# Patient Record
Sex: FEMALE | Race: WHITE | NOT HISPANIC OR LATINO | Employment: OTHER | URBAN - METROPOLITAN AREA
[De-identification: names, ages, dates, MRNs, and addresses within clinical notes are randomized per-mention and may not be internally consistent; named-entity substitution may affect disease eponyms.]

---

## 2017-01-12 ENCOUNTER — GENERIC CONVERSION - ENCOUNTER (OUTPATIENT)
Dept: OTHER | Facility: OTHER | Age: 82
End: 2017-01-12

## 2017-01-30 ENCOUNTER — TRANSCRIBE ORDERS (OUTPATIENT)
Dept: LAB | Facility: CLINIC | Age: 82
End: 2017-01-30

## 2017-01-30 ENCOUNTER — APPOINTMENT (OUTPATIENT)
Dept: LAB | Facility: CLINIC | Age: 82
End: 2017-01-30
Payer: MEDICARE

## 2017-01-30 DIAGNOSIS — I10 ESSENTIAL (PRIMARY) HYPERTENSION: ICD-10-CM

## 2017-01-30 DIAGNOSIS — I10 ESSENTIAL HYPERTENSION, MALIGNANT: Primary | ICD-10-CM

## 2017-01-30 LAB
ALBUMIN SERPL BCP-MCNC: 3.7 G/DL (ref 3.5–5)
ALP SERPL-CCNC: 95 U/L (ref 46–116)
ALT SERPL W P-5'-P-CCNC: 26 U/L (ref 12–78)
ANION GAP SERPL CALCULATED.3IONS-SCNC: 6 MMOL/L (ref 4–13)
AST SERPL W P-5'-P-CCNC: 18 U/L (ref 5–45)
BASOPHILS # BLD AUTO: 0.02 THOUSANDS/ΜL (ref 0–0.1)
BASOPHILS NFR BLD AUTO: 0 % (ref 0–1)
BILIRUB SERPL-MCNC: 0.42 MG/DL (ref 0.2–1)
BUN SERPL-MCNC: 25 MG/DL (ref 5–25)
CALCIUM SERPL-MCNC: 9.7 MG/DL (ref 8.3–10.1)
CHLORIDE SERPL-SCNC: 104 MMOL/L (ref 100–108)
CHOLEST SERPL-MCNC: 159 MG/DL (ref 50–200)
CO2 SERPL-SCNC: 28 MMOL/L (ref 21–32)
CREAT SERPL-MCNC: 1.07 MG/DL (ref 0.6–1.3)
EOSINOPHIL # BLD AUTO: 0.08 THOUSAND/ΜL (ref 0–0.61)
EOSINOPHIL NFR BLD AUTO: 2 % (ref 0–6)
ERYTHROCYTE [DISTWIDTH] IN BLOOD BY AUTOMATED COUNT: 13.2 % (ref 11.6–15.1)
GFR SERPL CREATININE-BSD FRML MDRD: 49.3 ML/MIN/1.73SQ M
GLUCOSE SERPL-MCNC: 91 MG/DL (ref 65–140)
HCT VFR BLD AUTO: 36.2 % (ref 34.8–46.1)
HDLC SERPL-MCNC: 77 MG/DL (ref 40–60)
HGB BLD-MCNC: 12 G/DL (ref 11.5–15.4)
LDLC SERPL DIRECT ASSAY-MCNC: 74 MG/DL (ref 0–100)
LYMPHOCYTES # BLD AUTO: 1.34 THOUSANDS/ΜL (ref 0.6–4.47)
LYMPHOCYTES NFR BLD AUTO: 25 % (ref 14–44)
MCH RBC QN AUTO: 30.7 PG (ref 26.8–34.3)
MCHC RBC AUTO-ENTMCNC: 33.1 G/DL (ref 31.4–37.4)
MCV RBC AUTO: 93 FL (ref 82–98)
MONOCYTES # BLD AUTO: 0.29 THOUSAND/ΜL (ref 0.17–1.22)
MONOCYTES NFR BLD AUTO: 6 % (ref 4–12)
NEUTROPHILS # BLD AUTO: 3.55 THOUSANDS/ΜL (ref 1.85–7.62)
NEUTS SEG NFR BLD AUTO: 67 % (ref 43–75)
NONHDLC SERPL-MCNC: 82 MG/DL
NRBC BLD AUTO-RTO: 0 /100 WBCS
PLATELET # BLD AUTO: 203 THOUSANDS/UL (ref 149–390)
PMV BLD AUTO: 10.5 FL (ref 8.9–12.7)
POTASSIUM SERPL-SCNC: 4.8 MMOL/L (ref 3.5–5.3)
PROT SERPL-MCNC: 6.9 G/DL (ref 6.4–8.2)
RBC # BLD AUTO: 3.91 MILLION/UL (ref 3.81–5.12)
SODIUM SERPL-SCNC: 138 MMOL/L (ref 136–145)
TRIGL SERPL-MCNC: 61 MG/DL
TSH SERPL DL<=0.05 MIU/L-ACNC: 0.91 UIU/ML (ref 0.36–3.74)
WBC # BLD AUTO: 5.28 THOUSAND/UL (ref 4.31–10.16)

## 2017-01-30 PROCEDURE — 36415 COLL VENOUS BLD VENIPUNCTURE: CPT

## 2017-01-30 PROCEDURE — 83721 ASSAY OF BLOOD LIPOPROTEIN: CPT

## 2017-01-30 PROCEDURE — 80053 COMPREHEN METABOLIC PANEL: CPT

## 2017-01-30 PROCEDURE — 85025 COMPLETE CBC W/AUTO DIFF WBC: CPT

## 2017-01-30 PROCEDURE — 80061 LIPID PANEL: CPT

## 2017-01-30 PROCEDURE — 84443 ASSAY THYROID STIM HORMONE: CPT

## 2017-01-31 ENCOUNTER — APPOINTMENT (OUTPATIENT)
Dept: LAB | Facility: CLINIC | Age: 82
End: 2017-01-31
Payer: MEDICARE

## 2017-01-31 LAB
BACTERIA UR QL AUTO: ABNORMAL /HPF
BILIRUB UR QL STRIP: NEGATIVE
CLARITY UR: CLEAR
COLOR UR: YELLOW
GLUCOSE UR STRIP-MCNC: NEGATIVE MG/DL
HGB UR QL STRIP.AUTO: NEGATIVE
HYALINE CASTS #/AREA URNS LPF: ABNORMAL /LPF
KETONES UR STRIP-MCNC: NEGATIVE MG/DL
LEUKOCYTE ESTERASE UR QL STRIP: ABNORMAL
NITRITE UR QL STRIP: NEGATIVE
NON-SQ EPI CELLS URNS QL MICRO: ABNORMAL /HPF
PH UR STRIP.AUTO: 6.5 [PH] (ref 4.5–8)
PROT UR STRIP-MCNC: NEGATIVE MG/DL
RBC #/AREA URNS AUTO: ABNORMAL /HPF
SP GR UR STRIP.AUTO: 1.02 (ref 1–1.03)
UROBILINOGEN UR QL STRIP.AUTO: 0.2 E.U./DL
WBC #/AREA URNS AUTO: ABNORMAL /HPF

## 2017-01-31 PROCEDURE — 81001 URINALYSIS AUTO W/SCOPE: CPT | Performed by: INTERNAL MEDICINE

## 2017-02-14 ENCOUNTER — ALLSCRIPTS OFFICE VISIT (OUTPATIENT)
Dept: OTHER | Facility: OTHER | Age: 82
End: 2017-02-14

## 2017-02-14 DIAGNOSIS — M81.0 AGE-RELATED OSTEOPOROSIS WITHOUT CURRENT PATHOLOGICAL FRACTURE: ICD-10-CM

## 2017-02-23 ENCOUNTER — GENERIC CONVERSION - ENCOUNTER (OUTPATIENT)
Dept: OTHER | Facility: OTHER | Age: 82
End: 2017-02-23

## 2017-04-06 ENCOUNTER — GENERIC CONVERSION - ENCOUNTER (OUTPATIENT)
Dept: OTHER | Facility: OTHER | Age: 82
End: 2017-04-06

## 2017-04-07 ENCOUNTER — ALLSCRIPTS OFFICE VISIT (OUTPATIENT)
Dept: OTHER | Facility: OTHER | Age: 82
End: 2017-04-07

## 2017-04-07 LAB — S PYO AG THROAT QL: NEGATIVE

## 2017-05-03 ENCOUNTER — HOSPITAL ENCOUNTER (OUTPATIENT)
Dept: MAMMOGRAPHY | Facility: CLINIC | Age: 82
Discharge: HOME | End: 2017-05-03
Payer: MEDICARE

## 2017-05-03 DIAGNOSIS — M81.0 AGE-RELATED OSTEOPOROSIS WITHOUT CURRENT PATHOLOGICAL FRACTURE: ICD-10-CM

## 2017-05-03 PROCEDURE — 77080 DXA BONE DENSITY AXIAL: CPT

## 2017-05-08 ENCOUNTER — ALLSCRIPTS OFFICE VISIT (OUTPATIENT)
Dept: OTHER | Facility: OTHER | Age: 82
End: 2017-05-08

## 2017-06-05 ENCOUNTER — GENERIC CONVERSION - ENCOUNTER (OUTPATIENT)
Dept: OTHER | Facility: OTHER | Age: 82
End: 2017-06-05

## 2017-06-06 ENCOUNTER — ALLSCRIPTS OFFICE VISIT (OUTPATIENT)
Dept: OTHER | Facility: OTHER | Age: 82
End: 2017-06-06

## 2017-06-19 ENCOUNTER — TRANSCRIBE ORDERS (OUTPATIENT)
Dept: LAB | Facility: CLINIC | Age: 82
End: 2017-06-19

## 2017-06-19 ENCOUNTER — APPOINTMENT (OUTPATIENT)
Dept: LAB | Facility: CLINIC | Age: 82
End: 2017-06-19
Payer: MEDICARE

## 2017-06-19 DIAGNOSIS — N30.90 CYSTITIS, UNSPECIFIED: ICD-10-CM

## 2017-06-19 DIAGNOSIS — N30.90 CYSTITIS, UNSPECIFIED: Primary | ICD-10-CM

## 2017-06-19 LAB
BACTERIA UR QL AUTO: ABNORMAL /HPF
BILIRUB UR QL STRIP: NEGATIVE
CLARITY UR: CLEAR
COLOR UR: YELLOW
GLUCOSE UR STRIP-MCNC: NEGATIVE MG/DL
HGB UR QL STRIP.AUTO: NEGATIVE
HYALINE CASTS #/AREA URNS LPF: ABNORMAL /LPF
KETONES UR STRIP-MCNC: NEGATIVE MG/DL
LEUKOCYTE ESTERASE UR QL STRIP: NEGATIVE
NITRITE UR QL STRIP: NEGATIVE
NON-SQ EPI CELLS URNS QL MICRO: ABNORMAL /HPF
PH UR STRIP.AUTO: 7 [PH] (ref 4.5–8)
PROT UR STRIP-MCNC: NEGATIVE MG/DL
RBC #/AREA URNS AUTO: ABNORMAL /HPF
SP GR UR STRIP.AUTO: 1.01 (ref 1–1.03)
UROBILINOGEN UR QL STRIP.AUTO: 0.2 E.U./DL
WBC #/AREA URNS AUTO: ABNORMAL /HPF

## 2017-06-19 PROCEDURE — 81001 URINALYSIS AUTO W/SCOPE: CPT | Performed by: OBSTETRICS & GYNECOLOGY

## 2017-06-19 PROCEDURE — 87086 URINE CULTURE/COLONY COUNT: CPT

## 2017-06-20 LAB — BACTERIA UR CULT: NORMAL

## 2017-07-21 ENCOUNTER — TRANSCRIBE ORDERS (OUTPATIENT)
Dept: LAB | Facility: CLINIC | Age: 82
End: 2017-07-21

## 2017-07-21 ENCOUNTER — APPOINTMENT (OUTPATIENT)
Dept: LAB | Facility: CLINIC | Age: 82
End: 2017-07-21
Payer: MEDICARE

## 2017-07-21 DIAGNOSIS — N30.90 CYSTITIS, UNSPECIFIED: Primary | ICD-10-CM

## 2017-07-21 DIAGNOSIS — N30.90 CYSTITIS, UNSPECIFIED: ICD-10-CM

## 2017-07-21 LAB

## 2017-07-21 PROCEDURE — 81001 URINALYSIS AUTO W/SCOPE: CPT | Performed by: OBSTETRICS & GYNECOLOGY

## 2017-07-21 PROCEDURE — 87086 URINE CULTURE/COLONY COUNT: CPT

## 2017-07-22 LAB — BACTERIA UR CULT: NORMAL

## 2017-08-14 ENCOUNTER — GENERIC CONVERSION - ENCOUNTER (OUTPATIENT)
Dept: OTHER | Facility: OTHER | Age: 82
End: 2017-08-14

## 2017-10-02 ENCOUNTER — GENERIC CONVERSION - ENCOUNTER (OUTPATIENT)
Dept: OTHER | Facility: OTHER | Age: 82
End: 2017-10-02

## 2017-10-20 ENCOUNTER — GENERIC CONVERSION - ENCOUNTER (OUTPATIENT)
Dept: OTHER | Facility: OTHER | Age: 82
End: 2017-10-20

## 2017-11-10 ENCOUNTER — GENERIC CONVERSION - ENCOUNTER (OUTPATIENT)
Dept: OTHER | Facility: OTHER | Age: 82
End: 2017-11-10

## 2017-11-13 ENCOUNTER — ALLSCRIPTS OFFICE VISIT (OUTPATIENT)
Dept: OTHER | Facility: OTHER | Age: 82
End: 2017-11-13

## 2017-11-14 NOTE — PROGRESS NOTES
Assessment  1  Seborrheic dermatitis (690 10) (L21 9)   2  Screening for skin condition (V82 0) (Z13 89)   3  Seborrheic keratosis (702 19) (L82 1)    Plan     · Ketoconazole 2 % External Cream; APPLY TO AFFECTED AREA SPARINGLYTWICE DAILY Face 3 weeks   · Seborrheic Dermatitis education given today; Status:Complete;   Done: [id number]   · Follow Up if Not Better Evaluation and Treatment  Follow-up  Status: Complete  Done:13Nov2017 03:10PM    Discussion/Summary  Discussion Summary- [facility]:  Assessment #1: Seborrheic dermatitis  Care Plan:  We discussed the concept of this eczematous process will go ahead and treat with ketoconazole cream follow-up if no improvement noted  Assessment #2: Seborrheic keratosis  Care Plan:  Patient reassured these are normal growths we acquire with age no treatment needed  Assessment #3: Screening for dermatologic disorders  Care Plan:  Nothing else of concern noted on complete exam follow-up as needed  Chief Complaint  Chief Complaint Free Text Note Form: Patient here for a spot on her nose  refused gown  History of Present Illness  HPI: 81-year-old female presents for concerns regarding a rash on her nose  Patient notes that the area has been dry and scaling no other concerns noted      Review of Systems  Complete Female [facility] Patient:  Constitutional: Denies constitutional symptoms  Eyes: Denies eye symptoms  ENT:  denies ear symptoms, nasal symptoms, mouth or throat symptoms  Cardiovascular: Denies cardiovascular symptoms  Respiratory: Denies respiratory symptoms  Gastrointestinal: Denies gastrointestinal symptoms  Musculoskeletal: Denies musculoskeletal symptoms  Integumentary: Denies skin, hair and nail symptoms  Neurological: Denies neurologic symptoms  Psychiatric: Denies psychiatric symptoms  Endocrine: Denies endocrine symptoms  Hematologic/Lymphatic: Denies hematologic symptoms  Active Problems  1   Acute laryngitis (464 00) (J04 0)   2  Cough (786 2) (R05)   3  Dizzy spells (780 4) (R42)   4  Hyperlipidemia (272 4) (E78 5)   5  Hypertension (401 9) (I10)   6  Left shoulder pain (719 41) (M25 512)   7  Need for vaccination with 13-polyvalent pneumococcal conjugate vaccine (V03 82) (Z23)   8  History of Occlusion of carotid artery, unspecified laterality (433 10) (I65 29)   9  Osteoporosis (733 00) (M81 0)   10  Preoperative examination (V72 84) (Z01 818)   11  Screening for skin condition (V82 0) (Z13 89)   12  Seborrheic keratosis (702 19) (L82 1)   13  Vitamin D deficiency (268 9) (E55 9)    Past Medical History  1  History of Cardiac arrhythmia (427 9) (I49 9)   2  Compression fracture of L1 lumbar vertebra (805 4) (S32 010A)   3  History of alopecia (V13 89) (Z87 898)   4  History of fatigue (V13 89) (Z87 898)   5  History of sebaceous cyst (V13 3) (Z87 2)   6  History of Occlusion of carotid artery, unspecified laterality (433 10) (I65 29)  Past Medical History Reviewed- Derm:   The past medical history was reviewed  Surgical History  1  History of Appendectomy   2  History of Carotid Thromboendarterectomy   3  History of Complete Colonoscopy   4  History of Diagnostic Esophagogastroduodenoscopy   5  History of Tonsillectomy  Surgical History Reviewed Doren Needles- Derm:   Surgical History reviewed      Family History  Mother    1  No pertinent family history  Family History Reviewed- Derm:   Family History was reviewed      Social History     · Alcohol Use (History)   · Denied: History of Drug Use   · Never A Smoker  Social History Reviewed Doren Needles- Derm: The social history was reviewed      Current Meds   1  Advil CAPS Recorded   2  Atenolol 25 MG Oral Tablet; Tale 1/2 tablet daily; Therapy: 36PIQ3025 to (Adebayo Cai)  Requested for: 51VIL6530; Last Rx:06Jun2017 Ordered   3   Atorvastatin Calcium 40 MG Oral Tablet; TAKE 1 TABLET DAILY  Requested for: 24Apr2017; Last Rx:24Apr2017; Status: ACTIVE - Renewal Denied Ordered   4  Baby Aspirin 81 MG CHEW; CHEW AND SWALLOW 1 TABLET DAILY; Therapy: (Emerson Giraldo) to Recorded   5  Multi-Vitamin TABS; Therapy: (Recorded:08Foz8853) to Recorded  Medication List Reviewed: The medication list was reviewed and updated today  Allergies  1  Latex Exam Gloves MISC    2  Latex    Physical Exam   Constitutional  General appearance: Appears healthy and well developed  Lymphatic  No visible disturbance  Musculoskeletal  Digits and nails: No clubbing, cyanosis or edema  Cutaneous and nail exam normal    Skin  Scalp skin texture and hair distribution: Normal skin texture on scalp, normal hair distribution  Head: Abnormal    Neck: Normal turgor, no rashes, no lesions  Abdomen: Normal turgor, no rashes, no lesions  Back: Normal turgor, no rashes, no lesions  Right upper extremity: Normal turgor, no rashes, no lesions  Left upper extremity: Normal turgor, no rashes, no lesions  Right lower extremity: Normal turgor, no rashes, no lesions  Left lower extremity: Normal turgor, no rashes, no lesions  Neuro/Psych  Alert and oriented x 3  Displays comfort and cooperation during encounterl  Affect is normal    Finding Perinasal scaling noted nothing else atypical noted on complete exam normal keratotic papules with greasy stuck on appearance        Signatures   Electronically signed by : JOSE M Mancini ; Nov 13 2017  3:24PM EST                       (Author)

## 2017-12-21 ENCOUNTER — APPOINTMENT (OUTPATIENT)
Dept: LAB | Facility: CLINIC | Age: 82
End: 2017-12-21
Payer: MEDICARE

## 2017-12-21 ENCOUNTER — ALLSCRIPTS OFFICE VISIT (OUTPATIENT)
Dept: OTHER | Facility: OTHER | Age: 82
End: 2017-12-21

## 2017-12-21 DIAGNOSIS — R35.8 OTHER POLYURIA (CODE): ICD-10-CM

## 2017-12-21 DIAGNOSIS — I10 ESSENTIAL (PRIMARY) HYPERTENSION: ICD-10-CM

## 2017-12-21 DIAGNOSIS — R30.9 PAINFUL MICTURITION: ICD-10-CM

## 2017-12-21 LAB
ALBUMIN SERPL BCP-MCNC: 3.7 G/DL (ref 3.5–5)
ALP SERPL-CCNC: 104 U/L (ref 46–116)
ALT SERPL W P-5'-P-CCNC: 22 U/L (ref 12–78)
ANION GAP SERPL CALCULATED.3IONS-SCNC: 6 MMOL/L (ref 4–13)
AST SERPL W P-5'-P-CCNC: 19 U/L (ref 5–45)
BASOPHILS # BLD AUTO: 0.02 THOUSANDS/ΜL (ref 0–0.1)
BASOPHILS NFR BLD AUTO: 0 % (ref 0–1)
BILIRUB SERPL-MCNC: 0.4 MG/DL (ref 0.2–1)
BUN SERPL-MCNC: 18 MG/DL (ref 5–25)
CALCIUM SERPL-MCNC: 10.1 MG/DL (ref 8.3–10.1)
CHLORIDE SERPL-SCNC: 102 MMOL/L (ref 100–108)
CHOLEST SERPL-MCNC: 177 MG/DL (ref 50–200)
CO2 SERPL-SCNC: 30 MMOL/L (ref 21–32)
CREAT SERPL-MCNC: 0.97 MG/DL (ref 0.6–1.3)
EOSINOPHIL # BLD AUTO: 0.13 THOUSAND/ΜL (ref 0–0.61)
EOSINOPHIL NFR BLD AUTO: 3 % (ref 0–6)
ERYTHROCYTE [DISTWIDTH] IN BLOOD BY AUTOMATED COUNT: 13.4 % (ref 11.6–15.1)
GFR SERPL CREATININE-BSD FRML MDRD: 55 ML/MIN/1.73SQ M
GLUCOSE SERPL-MCNC: 92 MG/DL (ref 65–140)
HCT VFR BLD AUTO: 36.5 % (ref 34.8–46.1)
HDLC SERPL-MCNC: 73 MG/DL (ref 40–60)
HGB BLD-MCNC: 12 G/DL (ref 11.5–15.4)
LYMPHOCYTES # BLD AUTO: 1.2 THOUSANDS/ΜL (ref 0.6–4.47)
LYMPHOCYTES NFR BLD AUTO: 26 % (ref 14–44)
MCH RBC QN AUTO: 31.1 PG (ref 26.8–34.3)
MCHC RBC AUTO-ENTMCNC: 32.9 G/DL (ref 31.4–37.4)
MCV RBC AUTO: 95 FL (ref 82–98)
MONOCYTES # BLD AUTO: 0.49 THOUSAND/ΜL (ref 0.17–1.22)
MONOCYTES NFR BLD AUTO: 11 % (ref 4–12)
NEUTROPHILS # BLD AUTO: 2.73 THOUSANDS/ΜL (ref 1.85–7.62)
NEUTS SEG NFR BLD AUTO: 60 % (ref 43–75)
NONHDLC SERPL-MCNC: 104 MG/DL
NRBC BLD AUTO-RTO: 0 /100 WBCS
PLATELET # BLD AUTO: 211 THOUSANDS/UL (ref 149–390)
PMV BLD AUTO: 10 FL (ref 8.9–12.7)
POTASSIUM SERPL-SCNC: 4.6 MMOL/L (ref 3.5–5.3)
PROT SERPL-MCNC: 7.1 G/DL (ref 6.4–8.2)
RBC # BLD AUTO: 3.86 MILLION/UL (ref 3.81–5.12)
SODIUM SERPL-SCNC: 138 MMOL/L (ref 136–145)
TSH SERPL DL<=0.05 MIU/L-ACNC: 2.07 UIU/ML (ref 0.36–3.74)
WBC # BLD AUTO: 4.59 THOUSAND/UL (ref 4.31–10.16)

## 2017-12-21 PROCEDURE — 36415 COLL VENOUS BLD VENIPUNCTURE: CPT

## 2017-12-21 PROCEDURE — 80053 COMPREHEN METABOLIC PANEL: CPT

## 2017-12-21 PROCEDURE — 82465 ASSAY BLD/SERUM CHOLESTEROL: CPT

## 2017-12-21 PROCEDURE — 84443 ASSAY THYROID STIM HORMONE: CPT

## 2017-12-21 PROCEDURE — 83718 ASSAY OF LIPOPROTEIN: CPT

## 2017-12-21 PROCEDURE — 85025 COMPLETE CBC W/AUTO DIFF WBC: CPT

## 2017-12-22 ENCOUNTER — APPOINTMENT (OUTPATIENT)
Dept: LAB | Facility: CLINIC | Age: 82
End: 2017-12-22
Payer: MEDICARE

## 2017-12-22 ENCOUNTER — TRANSCRIBE ORDERS (OUTPATIENT)
Dept: MRI IMAGING | Facility: CLINIC | Age: 82
End: 2017-12-22

## 2017-12-22 DIAGNOSIS — I10 ESSENTIAL HYPERTENSION, MALIGNANT: Primary | ICD-10-CM

## 2017-12-22 LAB
BACTERIA UR QL AUTO: ABNORMAL /HPF
BILIRUB UR QL STRIP: NEGATIVE
CLARITY UR: ABNORMAL
COLOR UR: YELLOW
GLUCOSE UR STRIP-MCNC: NEGATIVE MG/DL
HGB UR QL STRIP.AUTO: NEGATIVE
HYALINE CASTS #/AREA URNS LPF: ABNORMAL /LPF
KETONES UR STRIP-MCNC: NEGATIVE MG/DL
LEUKOCYTE ESTERASE UR QL STRIP: ABNORMAL
NITRITE UR QL STRIP: NEGATIVE
NON-SQ EPI CELLS URNS QL MICRO: ABNORMAL /HPF
PH UR STRIP.AUTO: 6.5 [PH] (ref 4.5–8)
PROT UR STRIP-MCNC: NEGATIVE MG/DL
RBC #/AREA URNS AUTO: ABNORMAL /HPF
SP GR UR STRIP.AUTO: 1.01 (ref 1–1.03)
UROBILINOGEN UR QL STRIP.AUTO: 0.2 E.U./DL
WBC #/AREA URNS AUTO: ABNORMAL /HPF

## 2017-12-22 PROCEDURE — 81001 URINALYSIS AUTO W/SCOPE: CPT | Performed by: INTERNAL MEDICINE

## 2017-12-22 NOTE — PROGRESS NOTES
Assessment   1  Hyperlipidemia (272 4) (E78 5)   2  Hypertension (401 9) (I10)    Plan   Hypertension    · (1) CBC/PLT/DIFF; Status:Active; Requested for:26Thw1344;    · (1) COMPREHENSIVE METABOLIC PANEL; Status:Active; Requested for:93Qpu9923;    · (1) LIPID PANEL, NON FASTING W/O TRIG; Status:Active; Requested for:89Ess9372;    · (1) TSH WITH FT4 REFLEX; Status:Active; Requested for:73Nqw0636;    · (1) URINALYSIS (will reflex a microscopy if leukocytes, occult blood, protein or nitrites are not within    normal limits); Status:Active; Requested for:54Qfk2012;   Lumbosacral spinal stenosis    · Celecoxib 200 MG Oral Capsule (CeleBREX); take 1 capsule by mouth once daily   · Famotidine 20 MG Oral Tablet; TAKE 1 TABLET DAILY AS DIRECTED    Discussion/Summary   Discussion Summary:    Two years of back pain originating with a fall getting worse  The compression fracture is long healed so the implication is that this is something call lumbar spinal stenosis  with [facility] Celebrex 200 mg daily for chronic NSAID use along with famotidine 20 for GI protection  We need to monitor the BUN and creatinine on a fairly regular basis testing today  in 6 months  Call if further problems develop  Other medical problems appear to be under good control  History of Present Illness   HPI: Hypertension and hyperlipidemia  complaint today of worsening of lumbar pain which probably has its origin in vertebral compression fractures occurring about 2 years ago with a fall  fact that this is getting worse so late after the fall tends to implicate spinal stenosis  Currently being treated at [facility] with epidural injections with minimal relief  this event which involved a trip and fall on an uneven floor surface has really rooms her life  She is alert she has no cognitive dysfunction she wants to get around but she can't do it  Aleve at home    patient is concerned about the safety profile of Aleve  arthroscopic surgery left shoulder for rotator cuff tear  This was September 16, 2016   As a result of the left rotator cuff surgery she used her right arm a lot and in some fashion managed to induce a right sided sciatica patient fell over a year ago  This appears to be accidental; due to an improperly placed carpet with an imperceptible rise   There was no precipitating event  She had multiple contusions of the upper extremities and also has developed a left middle finger trigger  Treated by orthopedics with physical therapy  compression fracture noted; currently taking calcium D  2012 found to have 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  Subsequently went for a left carotid endarterectomy  Followup ultrasounds are documented no significant stenosis  plasma proteins have been noted but there was no monoclonal spike on the last test done in September 2013  patient had a spontaneous partial collapse of L1-1 compression fracture-somewhere in mid September of 2014 She is followed with orthopedics  Pain is persistent but better without radiation  Only active treatment is pain control  DEXA done documented osteoporosis 11/2014             Geriatrics Fall, Nutrition, Mobility, Neuropsychological Assessment [facility]: Number of falls in the last year were 0  The fall resulted from tripping  The fall was preceded by no known event  The fall resulted in multiple contusions  A referral was made for Physician and Orthopedics  Nutrition Assessment Screening: Food intake over the last 3 months due to the loss of appetite, digestive problems, chewing or swallowing difficulties is graded as: 2 = no decrease in food intake  Weight loss during the last 3 months: 3 = no weight loss  Mobility scored as: 2 = goes out  2 = The patient has not experienced psychological stress or acute disease in the last 3 months  Neuropsychological problems scored as: 2 = no psychological problems        Body Mass Index (BMI) scored as: 3 = BMI 23 or greater  Nutritional Assessment Screening Score: 12 - 14 points - Normal nutritional status  Vertebral Compression Fracture: The patient is being seen for a routine clinic follow-up of vertebral compression fracture  This is a fracture of L 1  Fracture etiology: unknown  Fracture deformity: compression deformity, 50 % collapse  She was previously evaluated in the emergency room 1 month(s) ago  Previous presentation included back pain  Past treatment has included physical therapy and bracing  The patient presents with complaints of bilateral lower back pain, described as aching starting 2 years ago  The symptoms resulted from a fall  Symptoms are made worse by prolonged standing  Symptoms are worsening  No associated symptoms are reported  Hypertension (Follow-Up): The patient presents for follow-up of primary hypertension  The patient states she has been stable with her blood pressure control since the last visit  She has no comorbid illnesses  She has no significant interval events  Symptoms:             Hyperlipidemia: The patient is being seen for a routine clinic follow-up of hyperlipidemia  Disease complications:  carotid stenosis  No associated symptoms are reported  Current treatment includes statins  By report, there is good compliance with treatment and good tolerance of treatment  Carotid Artery Stenosis: The patient is being seen for a routine clinic follow-up of carotid artery stenosis  There is 70 % occlusion of the left carotid  Presentation included carotid stenosis screening finding  Past treatment has included aspirin, statins and Carotid endarterectomy  The patient is currently asymptomatic  Pertinent medical history:  hypertension-- and-- dyslipidemia  Review of Systems   Complete-Female:      Constitutional: not feeling poorly  Eyes: no eyesight problems-- and-- no purulent discharge from the eyes  ENT: no nosebleeds  Cardiovascular: no chest pain  Respiratory: no cough  Genitourinary: no dysuria-- and-- no dysmenorrhea  Musculoskeletal: as noted in HPI  Neurological: no headache-- and-- no numbness  Psychiatric: no anxiety-- and-- no depression  Hematologic/Lymphatic: no tendency for easy bleeding-- and-- no tendency for easy bruising  Preventive Quality 65 Older:      Preventive Quality 65 and Older: Falls Risk: The patient fell 0 times in the past 12 months  The patient currently has no urinary incontinence symptoms  ROS Reviewed:    ROS reviewed  Active Problems   1  Dizzy spells (780 4) (R42)   2  Hyperlipidemia (272 4) (E78 5)   3  Hypertension (401 9) (I10)   4  Left shoulder pain (719 41) (M25 512)   5  Need for vaccination with 13-polyvalent pneumococcal conjugate vaccine (V03 82) (Z23)   6  History of Occlusion of carotid artery, unspecified laterality (433 10) (I65 29)   7  Osteoporosis (733 00) (M81 0)   8  Preoperative examination (V72 84) (Z01 818)   9  Screening for skin condition (V82 0) (Z13 89)   10  Seborrheic dermatitis (690 10) (L21 9)   11  Seborrheic keratosis (702 19) (L82 1)   12  Vitamin D deficiency (268 9) (E55 9)    Past Medical History   1  History of Cardiac arrhythmia (427 9) (I49 9)   2  Compression fracture of L1 lumbar vertebra (805 4) (S32 010A)   3  History of alopecia (V13 89) (Z87 898)   4  History of fatigue (V13 89) (Z87 898)   5  History of sebaceous cyst (V13 3) (Z87 2)   6  History of Occlusion of carotid artery, unspecified laterality (433 10) (Y62 61)  Active Problems And Past Medical History Reviewed: The active problems and past medical history were reviewed and updated today  Surgical History   1  History of Appendectomy   2  History of Carotid Thromboendarterectomy   3  History of Complete Colonoscopy   4  History of Diagnostic Esophagogastroduodenoscopy   5   History of Tonsillectomy  Surgical History Reviewed: The surgical history was reviewed and updated today  Family History   Mother    1  No pertinent family history  Family History Reviewed: The family history was reviewed and updated today  Social History    · Alcohol Use (History)   · Denied: History of Drug Use   · Never A Smoker  Social History Reviewed: The social history was reviewed and updated today  Current Meds    1  Advil CAPS Recorded   2  Atenolol 25 MG Oral Tablet; Tale 1/2 tablet daily; Therapy: 85XZT2386 to (Ang Aleman)  Requested for: 28UMV0174; Last Rx:06Jun2017     Ordered   3  Atorvastatin Calcium 40 MG Oral Tablet; TAKE 1 TABLET DAILY  Requested for: 24Apr2017; Last     Rx:24Apr2017; Status: ACTIVE - Renewal Denied Ordered   4  Baby Aspirin 81 MG CHEW; CHEW AND SWALLOW 1 TABLET DAILY; Therapy: (Bridgette Ceballos) to Recorded   5  Ketoconazole 2 % External Cream; APPLY TO AFFECTED AREA SPARINGLY TWICE DAILY Face 3     weeks; Therapy: 38JTT1185 to (Last Rx:13Nov2017)  Requested for: 33BTU8246 Ordered   6  Multi-Vitamin TABS; Therapy: (Recorded:06Trj1053) to Recorded  Medication List Reviewed: The medication list was reviewed and updated today  Allergies   1  Latex Exam Gloves MISC  2  Latex    Vitals   Vital Signs    Recorded: 55Seo4768 08:14AM   Heart Rate 79   Systolic 743   Diastolic 80   Height 5 ft 7 in   Weight 164 lb    BMI Calculated 25 69   BSA Calculated 1 86   O2 Saturation 98     Physical Exam        Constitutional      General appearance: No acute distress, well appearing and well nourished  Eyes      Conjunctiva and lids: No swelling, erythema or discharge  Pupils and irises: Equal, round and reactive to light  Ears, Nose, Mouth, and Throat      External inspection of ears and nose: Normal        Nasal mucosa, septum, and turbinates: Normal without edema or erythema         Oropharynx: Normal with no erythema, edema, exudate or lesions  Pulmonary      Respiratory effort: No increased work of breathing or signs of respiratory distress  Auscultation of lungs: Clear to auscultation  Cardiovascular      Auscultation of heart: Normal rate and rhythm, normal S1 and S2, without murmurs  Examination of extremities for edema and/or varicosities: Normal        Carotid pulses: Normal        Abdomen      Abdomen: Non-tender, no masses  Lymphatic      Palpation of lymph nodes in neck: No lymphadenopathy  Musculoskeletal      Gait and station: Normal        Digits and nails: Normal without clubbing or cyanosis  -- Severe bunion deformity bilaterally  Skin      Skin and subcutaneous tissue: Normal without rashes or lesions  Neurologic      Cranial nerves: Cranial nerves 2-12 intact  Reflexes: 2+ and symmetric  Sensation: No sensory loss         Psychiatric      Orientation to person, place, and time: Normal        Mood and affect: Normal           Signatures    Electronically signed by : JOSE M Anne ; Dec 21 2017  8:58AM EST                       (Author)

## 2018-01-02 ENCOUNTER — GENERIC CONVERSION - ENCOUNTER (OUTPATIENT)
Dept: OTHER | Facility: OTHER | Age: 83
End: 2018-01-02

## 2018-01-10 ENCOUNTER — APPOINTMENT (OUTPATIENT)
Dept: LAB | Facility: CLINIC | Age: 83
End: 2018-01-10
Payer: MEDICARE

## 2018-01-10 DIAGNOSIS — R30.9 PAINFUL MICTURITION: ICD-10-CM

## 2018-01-10 DIAGNOSIS — R35.8 OTHER POLYURIA (CODE): ICD-10-CM

## 2018-01-10 PROCEDURE — 87077 CULTURE AEROBIC IDENTIFY: CPT

## 2018-01-10 PROCEDURE — 87186 SC STD MICRODIL/AGAR DIL: CPT

## 2018-01-10 PROCEDURE — 87147 CULTURE TYPE IMMUNOLOGIC: CPT

## 2018-01-10 PROCEDURE — 87086 URINE CULTURE/COLONY COUNT: CPT

## 2018-01-10 NOTE — PROGRESS NOTES
Assessment   1  Left shoulder pain (719 41) (M25 512)    Plan   Lumbosacral spinal stenosis    · From  Celecoxib 200 MG Oral Capsule take 1 capsule by mouth once daily To Celecoxib    200 MG Oral Capsule (CeleBREX) take 1 capsule by mouth bid    Discussion/Summary   Discussion Summary:    Chronic pyuria  culture; if there is a dominant organism consider suppression   pain with failure of Celebrex 1 daily  BUN and creatinine are normal  Try Celebrex 2 daily with famotidine for GI protective affect  in about a month  History of Present Illness   HPI: A patient with osteoarthritis and asymptomatic pyuria  pyuria has been her norm  Right now, she does not have any major symptoms but does report some vague sensation of perhaps increasing frequency  No fever or chills or sweats   of Celebrex 200 mg daily for osteoarthritis; 1 tablet daily has been ineffective  Review of Systems   Complete-Female:      Constitutional: not feeling poorly  Eyes: no eyesight problems-- and-- no purulent discharge from the eyes  ENT: no nosebleeds  Cardiovascular: no chest pain  Respiratory: no cough  Genitourinary: no dysuria-- and-- no dysmenorrhea  Musculoskeletal: as noted in HPI  Neurological: no headache-- and-- no numbness  Psychiatric: no anxiety-- and-- no depression  Hematologic/Lymphatic: no tendency for easy bleeding-- and-- no tendency for easy bruising  Preventive Quality 65 Older:      Preventive Quality 65 and Older: Falls Risk: The patient fell 0 times in the past 12 months  The patient currently has no urinary incontinence symptoms  ROS Reviewed:    ROS reviewed  Active Problems   1  Dizzy spells (780 4) (R42)   2  Hyperlipidemia (272 4) (E78 5)   3  Hypertension (401 9) (I10)   4  Left shoulder pain (719 41) (M25 512)   5  Lumbosacral spinal stenosis (724 02) (M48 07)   6   Need for vaccination with 13-polyvalent pneumococcal conjugate vaccine (V03 82) (Z23)   7  History of Occlusion of carotid artery, unspecified laterality (433 10) (I65 29)   8  Osteoporosis (733 00) (M81 0)   9  Preoperative examination (V72 84) (Z01 818)   10  Screening for skin condition (V82 0) (Z13 89)   11  Seborrheic dermatitis (690 10) (L21 9)   12  Seborrheic keratosis (702 19) (L82 1)   13  Vitamin D deficiency (268 9) (E55 9)    Past Medical History   1  History of Cardiac arrhythmia (427 9) (I49 9)   2  Compression fracture of L1 lumbar vertebra (805 4) (S32 010A)   3  History of alopecia (V13 89) (Z87 898)   4  History of fatigue (V13 89) (Z87 898)   5  History of sebaceous cyst (V13 3) (Z87 2)   6  History of Occlusion of carotid artery, unspecified laterality (433 10) (Z68 57)  Active Problems And Past Medical History Reviewed: The active problems and past medical history were reviewed and updated today  Surgical History   1  History of Appendectomy   2  History of Carotid Thromboendarterectomy   3  History of Complete Colonoscopy   4  History of Diagnostic Esophagogastroduodenoscopy   5  History of Tonsillectomy  Surgical History Reviewed: The surgical history was reviewed and updated today  Family History   Mother    1  No pertinent family history  Family History Reviewed: The family history was reviewed and updated today  Social History    · Alcohol Use (History)   · Denied: History of Drug Use   · Never A Smoker  Social History Reviewed: The social history was reviewed and updated today  Current Meds    1  Advil CAPS Recorded   2  Atenolol 25 MG Oral Tablet; Tale 1/2 tablet daily; Therapy: 98VPN8470 to ([facility])  Requested for: 85TVE1802; Last Rx:06Jun2017     Ordered   3  Atorvastatin Calcium 40 MG Oral Tablet; TAKE 1 TABLET DAILY  Requested for: 24Apr2017; Last     Rx:24Apr2017; Status: ACTIVE - Renewal Denied Ordered   4  Baby Aspirin 81 MG CHEW; CHEW AND SWALLOW 1 TABLET DAILY;      Therapy: (Hesham Pritchard) to Recorded   5  Celecoxib 200 MG Oral Capsule; take 1 capsule by mouth once daily; Therapy: 73Dzm3633 to (Last Rx:39Vro9776)  Requested for: 02Jan2018 Ordered   6  Famotidine 20 MG Oral Tablet; TAKE 1 TABLET DAILY AS DIRECTED; Therapy: 67Qzj1087 to (Last Estrlela Calin)  Requested for: 21Dec2017 Ordered   7  Ketoconazole 2 % External Cream; APPLY TO AFFECTED AREA SPARINGLY TWICE DAILY Face 3     weeks; Therapy: 96POZ2798 to (Last Rx:13Nov2017)  Requested for: 35BLT6264 Ordered   8  Multi-Vitamin TABS; Therapy: (Recorded:15Oct2015) to Recorded  Medication List Reviewed: The medication list was reviewed and updated today  Allergies   1  Latex Exam Gloves MISC  2  Latex    Physical Exam        Constitutional      General appearance: No acute distress, well appearing and well nourished         Psychiatric      Orientation to person, place, and time: Normal           Signatures    Electronically signed by : JOSE M Merino ; Jan 9 2018  9:47AM EST                       (Author)

## 2018-01-13 ENCOUNTER — APPOINTMENT (OUTPATIENT)
Dept: LAB | Facility: CLINIC | Age: 83
End: 2018-01-13
Payer: MEDICARE

## 2018-01-13 VITALS
RESPIRATION RATE: 15 BRPM | BODY MASS INDEX: 25.39 KG/M2 | DIASTOLIC BLOOD PRESSURE: 82 MMHG | WEIGHT: 162.13 LBS | SYSTOLIC BLOOD PRESSURE: 138 MMHG | HEART RATE: 66 BPM

## 2018-01-13 LAB
BACTERIA UR CULT: ABNORMAL
BACTERIA UR CULT: ABNORMAL

## 2018-01-13 PROCEDURE — 87086 URINE CULTURE/COLONY COUNT: CPT

## 2018-01-14 VITALS
DIASTOLIC BLOOD PRESSURE: 84 MMHG | WEIGHT: 155.38 LBS | SYSTOLIC BLOOD PRESSURE: 150 MMHG | BODY MASS INDEX: 24.39 KG/M2 | HEART RATE: 86 BPM | HEIGHT: 67 IN

## 2018-01-14 VITALS
BODY MASS INDEX: 25.66 KG/M2 | HEIGHT: 67 IN | OXYGEN SATURATION: 94 % | TEMPERATURE: 97.4 F | SYSTOLIC BLOOD PRESSURE: 108 MMHG | DIASTOLIC BLOOD PRESSURE: 58 MMHG | HEART RATE: 58 BPM | WEIGHT: 163.5 LBS

## 2018-01-14 VITALS
HEART RATE: 73 BPM | BODY MASS INDEX: 25.26 KG/M2 | DIASTOLIC BLOOD PRESSURE: 80 MMHG | OXYGEN SATURATION: 97 % | WEIGHT: 161.25 LBS | SYSTOLIC BLOOD PRESSURE: 128 MMHG | TEMPERATURE: 97.9 F

## 2018-01-15 LAB — BACTERIA UR CULT: NORMAL

## 2018-01-23 VITALS
SYSTOLIC BLOOD PRESSURE: 146 MMHG | HEIGHT: 67 IN | HEART RATE: 79 BPM | OXYGEN SATURATION: 98 % | WEIGHT: 164 LBS | BODY MASS INDEX: 25.74 KG/M2 | DIASTOLIC BLOOD PRESSURE: 80 MMHG

## 2018-03-14 ENCOUNTER — TELEPHONE (OUTPATIENT)
Dept: INTERNAL MEDICINE CLINIC | Facility: CLINIC | Age: 83
End: 2018-03-14

## 2018-03-14 DIAGNOSIS — M19.90 ARTHRITIS: Primary | ICD-10-CM

## 2018-03-14 NOTE — TELEPHONE ENCOUNTER
Pt would like a referral to see orthopedics from dr Lucila Francis, preferably in the [facility] network  Pt stated dr Lucila Francis would know why she needs to see them   Please advise

## 2018-03-14 NOTE — TELEPHONE ENCOUNTER
Recommendation is to see Dr Gisela Oliveros in the [facility] office A little bit further but I think easier to get in  [facility]

## 2018-03-15 NOTE — TELEPHONE ENCOUNTER
CALLED Pt  AND WAS NOTIFIE STOP CELEBREX NONE TODAY TAKE TOMORROW AND CALL HIM ON SAT   TO LET HIM KNOW WHAT HAPPENS

## 2018-03-15 NOTE — TELEPHONE ENCOUNTER
Pt  Alban Esposito NOTIFIED OF Dr Laura Varma, ALSO STATED SHE TOOK CELEBREX TODAY 2 AND HAS DONE 2 BEFORE AND TODAY SHE NOT FEELING WELL , NOT NAUSEOUS, JUST NOT FEELING BETTER DID EAT 2 PIECES OF TOAST AND HAD SOME TEA, NOT FEELING BETTER-PLEASE ADVISE

## 2018-03-22 ENCOUNTER — TELEPHONE (OUTPATIENT)
Dept: CARDIOLOGY CLINIC | Facility: CLINIC | Age: 83
End: 2018-03-22

## 2018-03-22 NOTE — TELEPHONE ENCOUNTER
PT IS HAVING SPINAL SX ON 4/12/18 & NEEDS SURG CL  PT HAD REQ TO SEE DR SANTO; PT IS SEEING BM2 ON 4/05/18 FOR SURG CL   WILL DR SANTO SEE PT OR SHOULD SHE SEE HIS PARTNER?

## 2018-03-23 DIAGNOSIS — N39.0 CHRONIC URINARY TRACT INFECTION: Primary | ICD-10-CM

## 2018-03-23 DIAGNOSIS — R35.89 POLYURIA: Primary | ICD-10-CM

## 2018-03-23 RX ORDER — METOPROLOL SUCCINATE 25 MG/1
1 TABLET, EXTENDED RELEASE ORAL DAILY
COMMUNITY
Start: 2018-01-23 | End: 2018-04-02 | Stop reason: ALTCHOICE

## 2018-03-23 RX ORDER — NITROFURANTOIN 25; 75 MG/1; MG/1
100 CAPSULE ORAL DAILY
Qty: 90 CAPSULE | Refills: 3 | Status: SHIPPED | OUTPATIENT
Start: 2018-03-23 | End: 2018-10-20

## 2018-03-23 RX ORDER — ASPIRIN 81 MG/1
1 TABLET, CHEWABLE ORAL DAILY
COMMUNITY
End: 2018-08-29

## 2018-03-23 RX ORDER — CELECOXIB 200 MG/1
CAPSULE ORAL
COMMUNITY
Start: 2018-01-02 | End: 2018-04-05

## 2018-03-23 RX ORDER — NITROFURANTOIN 25; 75 MG/1; MG/1
100 CAPSULE ORAL DAILY
COMMUNITY
Start: 2018-01-18 | End: 2018-03-23 | Stop reason: SDUPTHER

## 2018-03-23 RX ORDER — ATORVASTATIN CALCIUM 40 MG/1
40 TABLET, FILM COATED ORAL DAILY
COMMUNITY
Start: 2018-01-18 | End: 2018-04-26 | Stop reason: ALTCHOICE

## 2018-03-23 RX ORDER — NITROFURANTOIN 25; 75 MG/1; MG/1
100 CAPSULE ORAL DAILY
Qty: 90 CAPSULE | Refills: 0 | Status: CANCELLED | OUTPATIENT
Start: 2018-03-23 | End: 2018-06-21

## 2018-03-23 RX ORDER — FAMOTIDINE 20 MG/1
TABLET, FILM COATED ORAL
COMMUNITY
Start: 2017-12-21 | End: 2018-04-05

## 2018-03-23 RX ORDER — NITROFURANTOIN MACROCRYSTALS 100 MG/1
CAPSULE ORAL
COMMUNITY
Start: 2018-01-19 | End: 2018-03-23

## 2018-03-23 RX ORDER — ATENOLOL 25 MG/1
25 TABLET ORAL DAILY
COMMUNITY
Start: 2018-01-18 | End: 2018-06-07 | Stop reason: SDUPTHER

## 2018-03-23 NOTE — TELEPHONE ENCOUNTER
Pt  scheduled to see Cardio on 4/2 and would like to reschedule her pre-op w/ BM on 4/5 for the same day as Cardio  Advised he does not have an opening  She asked that I speak w/ him and see if he can squeeze her in  Please advise

## 2018-04-02 ENCOUNTER — OFFICE VISIT (OUTPATIENT)
Dept: CARDIOLOGY CLINIC | Facility: CLINIC | Age: 83
End: 2018-04-02
Payer: MEDICARE

## 2018-04-02 ENCOUNTER — APPOINTMENT (OUTPATIENT)
Dept: LAB | Facility: CLINIC | Age: 83
End: 2018-04-02
Payer: MEDICARE

## 2018-04-02 ENCOUNTER — APPOINTMENT (OUTPATIENT)
Dept: RADIOLOGY | Facility: CLINIC | Age: 83
End: 2018-04-02
Payer: MEDICARE

## 2018-04-02 ENCOUNTER — TRANSCRIBE ORDERS (OUTPATIENT)
Dept: LAB | Facility: CLINIC | Age: 83
End: 2018-04-02

## 2018-04-02 ENCOUNTER — TELEPHONE (OUTPATIENT)
Dept: INTERNAL MEDICINE CLINIC | Facility: CLINIC | Age: 83
End: 2018-04-02

## 2018-04-02 VITALS
DIASTOLIC BLOOD PRESSURE: 78 MMHG | HEIGHT: 67 IN | BODY MASS INDEX: 25.83 KG/M2 | OXYGEN SATURATION: 98 % | WEIGHT: 164.6 LBS | SYSTOLIC BLOOD PRESSURE: 142 MMHG | HEART RATE: 68 BPM

## 2018-04-02 DIAGNOSIS — I10 HYPERTENSION, ESSENTIAL: Primary | ICD-10-CM

## 2018-04-02 DIAGNOSIS — M54.5 BILATERAL LOW BACK PAIN, UNSPECIFIED CHRONICITY, WITH SCIATICA PRESENCE UNSPECIFIED: ICD-10-CM

## 2018-04-02 DIAGNOSIS — E78.2 COMBINED HYPERLIPIDEMIA: ICD-10-CM

## 2018-04-02 DIAGNOSIS — Z01.810 PRE-OPERATIVE CARDIOVASCULAR EXAMINATION: ICD-10-CM

## 2018-04-02 DIAGNOSIS — M54.5 BILATERAL LOW BACK PAIN, UNSPECIFIED CHRONICITY, WITH SCIATICA PRESENCE UNSPECIFIED: Primary | ICD-10-CM

## 2018-04-02 DIAGNOSIS — R06.00 DYSPNEA ON EFFORT: ICD-10-CM

## 2018-04-02 LAB
ALBUMIN SERPL BCP-MCNC: 4 G/DL (ref 3.5–5)
ALP SERPL-CCNC: 119 U/L (ref 46–116)
ALT SERPL W P-5'-P-CCNC: 20 U/L (ref 12–78)
ANION GAP SERPL CALCULATED.3IONS-SCNC: 6 MMOL/L (ref 4–13)
APTT PPP: 28 SECONDS (ref 23–35)
AST SERPL W P-5'-P-CCNC: 18 U/L (ref 5–45)
BACTERIA UR QL AUTO: ABNORMAL /HPF
BASOPHILS # BLD AUTO: 0.03 THOUSANDS/ΜL (ref 0–0.1)
BASOPHILS NFR BLD AUTO: 1 % (ref 0–1)
BILIRUB SERPL-MCNC: 0.32 MG/DL (ref 0.2–1)
BILIRUB UR QL STRIP: NEGATIVE
BUN SERPL-MCNC: 22 MG/DL (ref 5–25)
CALCIUM ALBUM COR SERPL-MCNC: 10.7 MG/DL (ref 8.3–10.1)
CALCIUM SERPL-MCNC: 10.7 MG/DL (ref 8.3–10.1)
CHLORIDE SERPL-SCNC: 102 MMOL/L (ref 100–108)
CLARITY UR: ABNORMAL
CO2 SERPL-SCNC: 29 MMOL/L (ref 21–32)
COLOR UR: YELLOW
CREAT SERPL-MCNC: 1.05 MG/DL (ref 0.6–1.3)
EOSINOPHIL # BLD AUTO: 0.08 THOUSAND/ΜL (ref 0–0.61)
EOSINOPHIL NFR BLD AUTO: 2 % (ref 0–6)
ERYTHROCYTE [DISTWIDTH] IN BLOOD BY AUTOMATED COUNT: 12.6 % (ref 11.6–15.1)
GFR SERPL CREATININE-BSD FRML MDRD: 50 ML/MIN/1.73SQ M
GLUCOSE P FAST SERPL-MCNC: 96 MG/DL (ref 65–99)
GLUCOSE UR STRIP-MCNC: NEGATIVE MG/DL
HCT VFR BLD AUTO: 38.8 % (ref 34.8–46.1)
HGB BLD-MCNC: 12.4 G/DL (ref 11.5–15.4)
HGB UR QL STRIP.AUTO: NEGATIVE
HYALINE CASTS #/AREA URNS LPF: ABNORMAL /LPF
INR PPP: 0.92 (ref 0.86–1.16)
KETONES UR STRIP-MCNC: NEGATIVE MG/DL
LEUKOCYTE ESTERASE UR QL STRIP: ABNORMAL
LYMPHOCYTES # BLD AUTO: 0.99 THOUSANDS/ΜL (ref 0.6–4.47)
LYMPHOCYTES NFR BLD AUTO: 24 % (ref 14–44)
MCH RBC QN AUTO: 30.2 PG (ref 26.8–34.3)
MCHC RBC AUTO-ENTMCNC: 32 G/DL (ref 31.4–37.4)
MCV RBC AUTO: 94 FL (ref 82–98)
MONOCYTES # BLD AUTO: 0.27 THOUSAND/ΜL (ref 0.17–1.22)
MONOCYTES NFR BLD AUTO: 7 % (ref 4–12)
NEUTROPHILS # BLD AUTO: 2.68 THOUSANDS/ΜL (ref 1.85–7.62)
NEUTS SEG NFR BLD AUTO: 66 % (ref 43–75)
NITRITE UR QL STRIP: POSITIVE
NON-SQ EPI CELLS URNS QL MICRO: ABNORMAL /HPF
NRBC BLD AUTO-RTO: 0 /100 WBCS
PH UR STRIP.AUTO: 6.5 [PH] (ref 4.5–8)
PLATELET # BLD AUTO: 188 THOUSANDS/UL (ref 149–390)
PMV BLD AUTO: 10.5 FL (ref 8.9–12.7)
POTASSIUM SERPL-SCNC: 4.5 MMOL/L (ref 3.5–5.3)
PROT SERPL-MCNC: 7.6 G/DL (ref 6.4–8.2)
PROT UR STRIP-MCNC: NEGATIVE MG/DL
PROTHROMBIN TIME: 12.4 SECONDS (ref 12.1–14.4)
RBC # BLD AUTO: 4.11 MILLION/UL (ref 3.81–5.12)
RBC #/AREA URNS AUTO: ABNORMAL /HPF
SODIUM SERPL-SCNC: 137 MMOL/L (ref 136–145)
SP GR UR STRIP.AUTO: 1.01 (ref 1–1.03)
UROBILINOGEN UR QL STRIP.AUTO: 0.2 E.U./DL
WBC # BLD AUTO: 4.06 THOUSAND/UL (ref 4.31–10.16)
WBC #/AREA URNS AUTO: ABNORMAL /HPF

## 2018-04-02 PROCEDURE — 99204 OFFICE O/P NEW MOD 45 MIN: CPT | Performed by: INTERNAL MEDICINE

## 2018-04-02 PROCEDURE — 36415 COLL VENOUS BLD VENIPUNCTURE: CPT

## 2018-04-02 PROCEDURE — 85610 PROTHROMBIN TIME: CPT

## 2018-04-02 PROCEDURE — 85025 COMPLETE CBC W/AUTO DIFF WBC: CPT

## 2018-04-02 PROCEDURE — 81001 URINALYSIS AUTO W/SCOPE: CPT | Performed by: NURSE PRACTITIONER

## 2018-04-02 PROCEDURE — 93000 ELECTROCARDIOGRAM COMPLETE: CPT | Performed by: INTERNAL MEDICINE

## 2018-04-02 PROCEDURE — 71046 X-RAY EXAM CHEST 2 VIEWS: CPT

## 2018-04-02 PROCEDURE — 80053 COMPREHEN METABOLIC PANEL: CPT

## 2018-04-02 PROCEDURE — 85730 THROMBOPLASTIN TIME PARTIAL: CPT

## 2018-04-02 NOTE — TELEPHONE ENCOUNTER
Pt  Was put on antibiotic in December  Having surgery April 12th, Surgeon may want to know what its for? Please call pt

## 2018-04-02 NOTE — PROGRESS NOTES
Cardiology Consultation     Sherine Noss  [id number]  1935  [address] [place] [address]      Patient presents for preoperative cardiovascular evaluation  Patient needs back surgery  Patient does have history of hypertension and hyperlipidemia  Patient does not have any history of coronary artery disease or MI in the past   Patient does have history of carotid endarterectomy in the past   Patient does have shortness of breath with exertion but her activities predominantly limited by back pain  She denies any history of leg edema orthopnea PND  No history of presyncope syncope  She states that she has been compliant with all her present medications  1  Hypertension, essential     2  Combined hyperlipidemia     3  Pre-operative cardiovascular examination       Patient Active Problem List   Diagnosis    Arthritis    Hypertension, essential    Combined hyperlipidemia    Pre-operative cardiovascular examination     Past Medical History:   Diagnosis Date    Alopecia     Cardiac arrhythmia     Compression fracture of L1 lumbar vertebra ([address] )     resolved 09/2014    Occlusion of carotid artery     unspecified laterality resolved 08/05/2016    Sebaceous cyst      Social History     Social History    Marital status: /Civil Union     Spouse name: N/A    Number of children: N/A    Years of education: N/A     Occupational History    Not on file       Social History Main Topics    Smoking status: Never Smoker    Smokeless tobacco: Not on file    Alcohol use Yes      Comment: rarely (history)     Drug use: No    Sexual activity: Not on file     Other Topics Concern    Not on file     Social History Narrative    No narrative on file      Family History   Problem Relation Age of Onset    No Known Problems Mother      Past Surgical History:   Procedure Laterality Date    APPENDECTOMY      COLONOSCOPY      resolved 2009    ESOPHAGOGASTRODUODENOSCOPY      mild gastritis resolved 2009    THROMBOENDARTERECTOMY Left     carotid, resloved 12/2012    TONSILLECTOMY         Current Outpatient Prescriptions:     aspirin 81 mg chewable tablet, Chew 1 tablet daily, Disp: , Rfl:     atenolol (TENORMIN) 25 mg tablet, 25 mg daily  , Disp: , Rfl:     atorvastatin (LIPITOR) 40 mg tablet, 40 mg daily  , Disp: , Rfl:     nitrofurantoin (MACROBID) 100 mg capsule, Take 1 capsule (100 mg total) by mouth daily, Disp: 90 capsule, Rfl: 3    celecoxib (CeleBREX) 200 mg capsule, , Disp: , Rfl:     famotidine (PEPCID) 20 mg tablet, , Disp: , Rfl:     metoprolol succinate (TOPROL-XL) 25 mg 24 hr tablet, Take 1 tablet by mouth daily, Disp: , Rfl:   Allergies   Allergen Reactions    Latex      Vitals:    04/02/18 1008   BP: 142/78   Pulse: 68   SpO2: 98%   Weight: 74 7 kg (164 lb 9 6 oz)   Height: 5' 7" (1 702 m)       Labs:  Appointment on 04/02/2018   Component Date Value    Sodium 04/02/2018 137     Potassium 04/02/2018 4 5     Chloride 04/02/2018 102     CO2 04/02/2018 29     Anion Gap 04/02/2018 6     BUN 04/02/2018 22     Creatinine 04/02/2018 1 05     Glucose, Fasting 04/02/2018 96     Calcium 04/02/2018 10 7*    Corrected Calcium 04/02/2018 10 7*    AST 04/02/2018 18     ALT 04/02/2018 20     Alkaline Phosphatase 04/02/2018 119*    Total Protein 04/02/2018 7 6     Albumin 04/02/2018 4 0     Total Bilirubin 04/02/2018 0 32     eGFR 04/02/2018 50     Protime 04/02/2018 12 4     INR 04/02/2018 0 92     PTT 04/02/2018 28     WBC 04/02/2018 4 06*    RBC 04/02/2018 4 11     Hemoglobin 04/02/2018 12 4     Hematocrit 04/02/2018 38 8     MCV 04/02/2018 94     MCH 04/02/2018 30 2     MCHC 04/02/2018 32 0     RDW 04/02/2018 12 6     MPV 04/02/2018 10 5     Platelets 27/53/1410 188     nRBC 04/02/2018 0     Neutrophils Relative 04/02/2018 66     Lymphocytes Relative 04/02/2018 24     Monocytes Relative 04/02/2018 7     Eosinophils Relative 04/02/2018 2     Basophils Relative 04/02/2018 1     Neutrophils Absolute 04/02/2018 2 68     Lymphocytes Absolute 04/02/2018 0 99     Monocytes Absolute 04/02/2018 0 27     Eosinophils Absolute 04/02/2018 0 08     Basophils Absolute 04/02/2018 0 03    Transcribe Orders on 04/02/2018   Component Date Value    Color, UA 04/02/2018 Yellow     Clarity, UA 04/02/2018 Cloudy     Specific Gravity, UA 04/02/2018 1 011     pH, UA 04/02/2018 6 5     Leukocytes, UA 04/02/2018 Large*    Nitrite, UA 04/02/2018 Positive*    Protein, UA 04/02/2018 Negative     Glucose, UA 04/02/2018 Negative     Ketones, UA 04/02/2018 Negative     Urobilinogen, UA 04/02/2018 0 2     Bilirubin, UA 04/02/2018 Negative     Blood, UA 04/02/2018 Negative     RBC, UA 04/02/2018 4-10*    WBC, UA 04/02/2018 Innumerable*    Epithelial Cells 04/02/2018 None Seen     Bacteria, UA 04/02/2018 Occasional     Hyaline Casts, UA 04/02/2018 None Seen      Imaging: No results found  Review of Systems:  Review of Systems   REVIEW OF SYSTEMS:  Constitutional:  Denies fever or chills   Eyes:  Denies change in visual acuity   HENT:  Unremarkable  Cardiovascular:  Dyspnea  GI:  Denies abdominal pain, nausea, vomiting, bloody stools or diarrhea   :  Denies dysuria, frequency, difficulty in micturition and nocturia  Musculoskeletal:  Back pain which needs surgery  Neurologic:  Denies headache, focal weakness or sensory changes   Endocrine:  Denies polyuria or polydipsia   Lymphatic:  Denies swollen glands   Psychiatric:  Denies depression or anxiety     Physical Exam:  Physical Exam   PHYSICAL EXAM:  General:  Patient is not in acute distress   Head: Normocephalic, Atraumatic  HEENT:  Both pupils normal-size atraumatic, normocephalic, nonicteric  Neck:  JVP not raised  Trachea central  No carotid bruit  Respiratory:  normal breath sounds no crackles   no rhonchi  Cardiovascular:  Regular rate and rhythm no S3 no murmurs  GI:  Abdomen soft nontender  No organomegaly  Lymphatic:  No cervical or inguinal lymphadenopathy  Neurologic:  Patient is awake alert, oriented   Grossly nonfocal    EKG shows sinus rhythm and is within normal limits    Discussion/Summary:  Patient who is preop for back surgery with no recent cardiac workup  Patient does have risk factors for coronary artery disease and advanced age  Patient will be scheduled for an echocardiogram to evaluate ejection fraction valves and look for evidence of pulmonary hypertension  Patient will also be scheduled for a pharmacological nuclear stress test to assess for ischemia  Patient is unable to exercise on the treadmill because of back pain  Symptoms to watch out from cardiac standpoint which would indicate the need for further cardiac evaluation discussed at length with patient  Will make final preoperative risk assessment and recommendations based on the results of echocardiogram and pharmacological nuclear stress test   Medications were reviewed  Patient will be considered moderate risk from a cardiovascular standpoint based on age and comorbidities as long as echocardiogram and pharmacological nuclear stress test or unremarkable  Plan of care was discussed at length with the patient  She is agreeable with the plan  Patient will continue to follow up with primary care physician as well as Orthopedics

## 2018-04-03 ENCOUNTER — HOSPITAL ENCOUNTER (OUTPATIENT)
Dept: NON INVASIVE DIAGNOSTICS | Facility: CLINIC | Age: 83
Discharge: HOME | End: 2018-04-03
Payer: MEDICARE

## 2018-04-03 DIAGNOSIS — R06.00 DYSPNEA ON EFFORT: ICD-10-CM

## 2018-04-03 DIAGNOSIS — Z01.810 PRE-OPERATIVE CARDIOVASCULAR EXAMINATION: ICD-10-CM

## 2018-04-03 PROCEDURE — 93306 TTE W/DOPPLER COMPLETE: CPT

## 2018-04-03 PROCEDURE — 93306 TTE W/DOPPLER COMPLETE: CPT | Performed by: INTERNAL MEDICINE

## 2018-04-04 ENCOUNTER — TELEPHONE (OUTPATIENT)
Dept: CARDIOLOGY CLINIC | Facility: CLINIC | Age: 83
End: 2018-04-04

## 2018-04-04 ENCOUNTER — HOSPITAL ENCOUNTER (OUTPATIENT)
Dept: NUCLEAR MEDICINE | Facility: HOSPITAL | Age: 83
Discharge: HOME | End: 2018-04-04
Attending: INTERNAL MEDICINE
Payer: MEDICARE

## 2018-04-04 ENCOUNTER — HOSPITAL ENCOUNTER (OUTPATIENT)
Dept: NON INVASIVE DIAGNOSTICS | Facility: HOSPITAL | Age: 83
Discharge: HOME | End: 2018-04-04
Attending: INTERNAL MEDICINE
Payer: MEDICARE

## 2018-04-04 DIAGNOSIS — Z01.810 PRE-OPERATIVE CARDIOVASCULAR EXAMINATION: ICD-10-CM

## 2018-04-04 DIAGNOSIS — R06.00 DYSPNEA ON EFFORT: ICD-10-CM

## 2018-04-04 LAB
CHEST PAIN STATEMENT: NORMAL
MAX DIASTOLIC BP: 74 MMHG
MAX HEART RATE: 100 BPM
MAX PREDICTED HEART RATE: 139 BPM
MAX. SYSTOLIC BP: 144 MMHG
PROTOCOL NAME: NORMAL
REASON FOR TERMINATION: NORMAL
TARGET HR FORMULA: NORMAL
TEST INDICATION: NORMAL
TIME IN EXERCISE PHASE: NORMAL

## 2018-04-04 PROCEDURE — 78452 HT MUSCLE IMAGE SPECT MULT: CPT

## 2018-04-04 PROCEDURE — 93017 CV STRESS TEST TRACING ONLY: CPT

## 2018-04-04 PROCEDURE — A9502 TC99M TETROFOSMIN: HCPCS

## 2018-04-04 RX ORDER — AMINOPHYLLINE DIHYDRATE 25 MG/ML
50 INJECTION, SOLUTION INTRAVENOUS ONCE
Status: COMPLETED | OUTPATIENT
Start: 2018-04-04 | End: 2018-04-04

## 2018-04-04 RX ADMIN — REGADENOSON 0.4 MG: 0.08 INJECTION, SOLUTION INTRAVENOUS at 09:28

## 2018-04-04 RX ADMIN — AMINOPHYLLINE 50 MG: 25 INJECTION, SOLUTION INTRAVENOUS at 09:35

## 2018-04-04 NOTE — TELEPHONE ENCOUNTER
----- Message from Dg Ford MD sent at 4/4/2018  2:27 PM EDT -----  Please call patient  Stress test overall unremarkable  Letter dated April 4th in the chart to be faxed to her surgeon Dr Sarwat Gutiérrez of [facility] [employer]  Patient for surgery I believe  next week

## 2018-04-04 NOTE — TELEPHONE ENCOUNTER
Spoke with pt she stated her understanding, letter faxed to Dr Claudine Bradley [phone] [facility] [phone]

## 2018-04-05 ENCOUNTER — CONSULT (OUTPATIENT)
Dept: INTERNAL MEDICINE CLINIC | Facility: CLINIC | Age: 83
End: 2018-04-05
Payer: MEDICARE

## 2018-04-05 ENCOUNTER — HOSPITAL ENCOUNTER (OUTPATIENT)
Dept: ULTRASOUND IMAGING | Facility: CLINIC | Age: 83
Discharge: HOME | End: 2018-04-05
Payer: MEDICARE

## 2018-04-05 VITALS
BODY MASS INDEX: 25.33 KG/M2 | OXYGEN SATURATION: 95 % | SYSTOLIC BLOOD PRESSURE: 138 MMHG | HEART RATE: 60 BPM | HEIGHT: 67 IN | WEIGHT: 161.4 LBS | DIASTOLIC BLOOD PRESSURE: 88 MMHG

## 2018-04-05 DIAGNOSIS — I10 HYPERTENSION, ESSENTIAL: Primary | ICD-10-CM

## 2018-04-05 DIAGNOSIS — S32.010S CLOSED COMPRESSION FRACTURE OF FIRST LUMBAR VERTEBRA, SEQUELA: ICD-10-CM

## 2018-04-05 DIAGNOSIS — R31.29 MICROSCOPIC HEMATURIA: ICD-10-CM

## 2018-04-05 DIAGNOSIS — M19.90 ARTHRITIS: ICD-10-CM

## 2018-04-05 DIAGNOSIS — E78.2 COMBINED HYPERLIPIDEMIA: ICD-10-CM

## 2018-04-05 PROBLEM — I65.29 OCCLUSION OF CAROTID ARTERY: Status: ACTIVE | Noted: 2018-04-05

## 2018-04-05 PROBLEM — S32.010A COMPRESSION FRACTURE OF L1 LUMBAR VERTEBRA (HCC): Status: RESOLVED | Noted: 2018-04-05 | Resolved: 2018-04-05

## 2018-04-05 PROBLEM — I65.29 OCCLUSION OF CAROTID ARTERY: Status: RESOLVED | Noted: 2018-04-05 | Resolved: 2018-04-05

## 2018-04-05 PROBLEM — I49.9 CARDIAC ARRHYTHMIA: Status: ACTIVE | Noted: 2018-04-05

## 2018-04-05 PROCEDURE — 99214 OFFICE O/P EST MOD 30 MIN: CPT | Performed by: INTERNAL MEDICINE

## 2018-04-05 PROCEDURE — 76770 US EXAM ABDO BACK WALL COMP: CPT

## 2018-04-05 RX ORDER — DIPHENOXYLATE HYDROCHLORIDE AND ATROPINE SULFATE 2.5; .025 MG/1; MG/1
1 TABLET ORAL
COMMUNITY
End: 2021-08-18

## 2018-04-05 RX ORDER — NAPROXEN SODIUM 220 MG
220 TABLET ORAL
COMMUNITY
End: 2018-04-05

## 2018-04-05 NOTE — PATIENT INSTRUCTIONS
A patient with controlled hypertension   already taking a beta blockade which should be continued in the perioperative  Recent cardiac workup is unremarkable  I note microscopic hematuria both 2 days ago and 3 months ago  For the sake of completeness, I would like to see urinary cytology and a renal ultrasound     from a cardiovascular viewpoint, patient is considered to be optimized for surgery  No further testing is   Needed to proceed with surgery    However, I would like to see the ultrasound and urine cytology preoperatively

## 2018-04-05 NOTE — PROGRESS NOTES
Assessment/Plan:       Diagnoses and all orders for this visit:    Hypertension, essential    Combined hyperlipidemia    Arthritis    Microscopic hematuria    Closed compression fracture of first lumbar vertebra, sequela    Other orders  -     Calcium Carbonate-Vitamin D3 600-400 MG-UNIT TABS; 1 tab  -     multivitamin (THERAGRAN) TABS; Take 1 tablet by mouth  -     Discontinue: naproxen sodium (ALEVE) 220 MG tablet; Take 220 mg by mouth          Subjective:      Patient ID: Jose Juan Randle is a 81 y o  female  Pre-surgical assessment  An 60-year-old lady with lumbar spinal stenosis is scheduled for spinal surgery in a week for relief of pain  Hypertension treated and stable   Hyperlipidemia treated and stable   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 surgically a number of years ago, never symptomatic      the patient has chronic pyuria and has had urinary tract infections in the past   She is currently on nitrofurantoin for prevention  No history of coronary disease  History of CHF  Stress testing and echocardiogram done yesterday were reported to be unremarkable    Laboratory testing reviewed  She does have microscopic hematuria documented in December and now about a week ago  The following portions of the patient's history were reviewed and updated as appropriate:   She has a past medical history of Alopecia; Cardiac arrhythmia; Compression fracture of L1 lumbar vertebra ([address] ); Occlusion of carotid artery; and Sebaceous cyst ,   does not have any pertinent problems on file  ,   has a past surgical history that includes Appendectomy; Thromboendarterectomy (Left); Colonoscopy; Esophagogastroduodenoscopy; and Tonsillectomy  ,  family history includes No Known Problems in her mother  ,   reports that she has never smoked  She has never used smokeless tobacco  She reports that she drinks alcohol  She reports that she does not use drugs  ,  is allergic to [employer] [mirabegron] and latex     Current Outpatient Prescriptions   Medication Sig Dispense Refill    aspirin 81 mg chewable tablet Chew 1 tablet daily      atenolol (TENORMIN) 25 mg tablet 25 mg daily        atorvastatin (LIPITOR) 40 mg tablet 40 mg daily        Calcium Carbonate-Vitamin D3 600-400 MG-UNIT TABS 1 tab      multivitamin (THERAGRAN) TABS Take 1 tablet by mouth      nitrofurantoin (MACROBID) 100 mg capsule Take 1 capsule (100 mg total) by mouth daily 90 capsule 3     No current facility-administered medications for this visit  Review of Systems   Constitutional: Negative for chills and fever  HENT: Negative for sore throat and trouble swallowing  Eyes: Negative for pain  Respiratory: Negative for cough, shortness of breath and wheezing  Gastrointestinal: Negative for abdominal pain, diarrhea, nausea and vomiting  Endocrine: Negative for cold intolerance and heat intolerance  Genitourinary: Negative for dysuria, frequency and pelvic pain  Musculoskeletal: Positive for arthralgias, back pain and gait problem  Negative for joint swelling  Skin: Negative for rash and wound  Allergic/Immunologic: Negative for immunocompromised state  Neurological: Negative for dizziness, seizures, syncope and headaches  Psychiatric/Behavioral: Negative for dysphoric mood  The patient is not nervous/anxious  Objective:  Vitals:    04/05/18 1018   BP: 138/88   Pulse: 60   SpO2: 95%      Physical Exam   Constitutional: She is oriented to person, place, and time  She appears well-developed and well-nourished  Patient appears stated age  Walking with a walker verbalizing complaint of pain with ambulation but otherwise asymptomatic   Other than pain distress, feels well overall  Not tachypneic not dyspneic not using accessory muscles  HENT:   Head: Normocephalic and atraumatic  Eyes: EOM are normal  Pupils are equal, round, and reactive to light  Neck: Normal range of motion   Neck supple  No tracheal deviation present  No thyromegaly present  Cardiovascular: Normal rate, regular rhythm, S1 normal and S2 normal   Exam reveals no gallop  Murmur heard  Systolic murmur is present with a grade of 2/6   A grade 2/6 systolic ejection murmur heard best right sternal border 2nd intercostal space radiating to the left sternal border  Pulmonary/Chest: No respiratory distress  She has no wheezes  She has no rales  Abdominal: Soft  Bowel sounds are normal  There is no tenderness  Musculoskeletal: Normal range of motion  She exhibits deformity  She exhibits no tenderness  Fairly mild osteoarthrosis of most joints  Bilateral bunion deformity      Neurological: She is alert and oriented to person, place, and time  Coordination normal    Skin: Skin is warm  Psychiatric: She has a normal mood and affect   Judgment normal

## 2018-04-10 ENCOUNTER — TELEPHONE (OUTPATIENT)
Dept: CARDIOLOGY CLINIC | Facility: CLINIC | Age: 83
End: 2018-04-10

## 2018-04-10 ENCOUNTER — TELEPHONE (OUTPATIENT)
Dept: INTERNAL MEDICINE CLINIC | Facility: CLINIC | Age: 83
End: 2018-04-10

## 2018-04-10 NOTE — TELEPHONE ENCOUNTER
[facility]MP[facility]RE[facility] FROM [facility] IS FAXING OVER CLEARANCE FORMS TO DR SANTO'S FAX  THE PAPERS WERE FAXED TO THE WRONG # ORIGINALLY AND THEY NEED THE PAPERS ASAP  THE PT SURGERY IS ON 04/12  PT WAS HERE ON 04/02 FOR APPT CARDIAC CLEARANCE   Miki Nuñez

## 2018-04-10 NOTE — TELEPHONE ENCOUNTER
PT was seen for surgical clearance appt    scheduled for surgery 4 12 18  Has she need cleared ???  She needs ov note stating she is cleared faxed    ASAGAL Sloan In case she needs additional testing  Megha Sloan She will have time to get this done

## 2018-04-10 NOTE — TELEPHONE ENCOUNTER
This was faxed on 4/4/18 with confirmation, letter faxed again with confirmation and the [employer] [facility]   Lm for Kwaku to let her know -MS

## 2018-04-10 NOTE — TELEPHONE ENCOUNTER
[facility] is calling regarding patient Monica Kaba   Need the results of the Ultrasound [phone]

## 2018-04-11 ENCOUNTER — APPOINTMENT (OUTPATIENT)
Dept: LAB | Facility: CLINIC | Age: 83
End: 2018-04-11
Payer: MEDICARE

## 2018-04-11 DIAGNOSIS — R31.29 MICROSCOPIC HEMATURIA: ICD-10-CM

## 2018-04-11 PROCEDURE — 88112 CYTOPATH CELL ENHANCE TECH: CPT | Performed by: PATHOLOGY

## 2018-04-17 ENCOUNTER — TELEPHONE (OUTPATIENT)
Dept: INTERNAL MEDICINE CLINIC | Facility: CLINIC | Age: 83
End: 2018-04-17

## 2018-04-17 NOTE — TELEPHONE ENCOUNTER
She needs clarification on a med that was prescribed by Dr Danni King  [facility]  She [place] & is taking nitrofurantoin   Franci Greene Instructions say  "indefinitely"    There is no stop date        She has med hx of micro scopic hematuria and chronic urine pyuria   Franci Greene PT didn't know why she was taking this med    Was it definitive that pt has a UTI  Franci Greene ? ??  She needs call back re this a s a p  Pls

## 2018-04-17 NOTE — TELEPHONE ENCOUNTER
I spoke w/ jeremiah and she said pt is taking this for chronic pyuria for prevention of infection    I called [facility] and spoke w/ jonah and notified

## 2018-04-25 ENCOUNTER — HOSPITAL ENCOUNTER (EMERGENCY)
Facility: HOSPITAL | Age: 83
Discharge: HOME | End: 2018-04-25
Admitting: EMERGENCY MEDICINE
Payer: MEDICARE

## 2018-04-25 ENCOUNTER — APPOINTMENT (EMERGENCY)
Dept: CT IMAGING | Facility: HOSPITAL | Age: 83
End: 2018-04-25
Payer: MEDICARE

## 2018-04-25 VITALS
SYSTOLIC BLOOD PRESSURE: 138 MMHG | TEMPERATURE: 98.2 F | OXYGEN SATURATION: 98 % | RESPIRATION RATE: 18 BRPM | HEART RATE: 92 BPM | DIASTOLIC BLOOD PRESSURE: 53 MMHG | WEIGHT: 169.13 LBS | HEIGHT: 67 IN | BODY MASS INDEX: 26.55 KG/M2

## 2018-04-25 DIAGNOSIS — K59.00 CONSTIPATION: Primary | ICD-10-CM

## 2018-04-25 DIAGNOSIS — R93.5 ABNORMAL CT OF THE ABDOMEN: ICD-10-CM

## 2018-04-25 LAB
ALBUMIN SERPL BCP-MCNC: 2.9 G/DL (ref 3.5–5)
ALP SERPL-CCNC: 123 U/L (ref 46–116)
ALT SERPL W P-5'-P-CCNC: 33 U/L (ref 12–78)
ANION GAP SERPL CALCULATED.3IONS-SCNC: 9 MMOL/L (ref 4–13)
APTT PPP: 30 SECONDS (ref 23–35)
AST SERPL W P-5'-P-CCNC: 19 U/L (ref 5–45)
BACTERIA UR QL AUTO: ABNORMAL /HPF
BASOPHILS # BLD AUTO: 0.03 THOUSANDS/ΜL (ref 0–0.1)
BASOPHILS NFR BLD AUTO: 0 % (ref 0–1)
BILIRUB DIRECT SERPL-MCNC: 0.15 MG/DL (ref 0–0.2)
BILIRUB SERPL-MCNC: 0.5 MG/DL (ref 0.2–1)
BILIRUB UR QL STRIP: NEGATIVE
BUN SERPL-MCNC: 16 MG/DL (ref 5–25)
CALCIUM SERPL-MCNC: 10.2 MG/DL (ref 8.3–10.1)
CHLORIDE SERPL-SCNC: 101 MMOL/L (ref 100–108)
CLARITY UR: ABNORMAL
CO2 SERPL-SCNC: 28 MMOL/L (ref 21–32)
COLOR UR: YELLOW
CREAT SERPL-MCNC: 0.98 MG/DL (ref 0.6–1.3)
EOSINOPHIL # BLD AUTO: 0.08 THOUSAND/ΜL (ref 0–0.61)
EOSINOPHIL NFR BLD AUTO: 1 % (ref 0–6)
ERYTHROCYTE [DISTWIDTH] IN BLOOD BY AUTOMATED COUNT: 12.9 % (ref 11.6–15.1)
GFR SERPL CREATININE-BSD FRML MDRD: 54 ML/MIN/1.73SQ M
GLUCOSE SERPL-MCNC: 134 MG/DL (ref 65–140)
GLUCOSE UR STRIP-MCNC: NEGATIVE MG/DL
HCT VFR BLD AUTO: 28.2 % (ref 34.8–46.1)
HGB BLD-MCNC: 9.2 G/DL (ref 11.5–15.4)
HGB UR QL STRIP.AUTO: NEGATIVE
INR PPP: 1.09 (ref 0.86–1.16)
KETONES UR STRIP-MCNC: NEGATIVE MG/DL
LACTATE SERPL-SCNC: 1.6 MMOL/L (ref 0.5–2)
LEUKOCYTE ESTERASE UR QL STRIP: ABNORMAL
LIPASE SERPL-CCNC: 115 U/L (ref 73–393)
LYMPHOCYTES # BLD AUTO: 0.68 THOUSANDS/ΜL (ref 0.6–4.47)
LYMPHOCYTES NFR BLD AUTO: 7 % (ref 14–44)
MCH RBC QN AUTO: 30.1 PG (ref 26.8–34.3)
MCHC RBC AUTO-ENTMCNC: 32.6 G/DL (ref 31.4–37.4)
MCV RBC AUTO: 92 FL (ref 82–98)
MONOCYTES # BLD AUTO: 0.39 THOUSAND/ΜL (ref 0.17–1.22)
MONOCYTES NFR BLD AUTO: 4 % (ref 4–12)
NEUTROPHILS # BLD AUTO: 8.01 THOUSANDS/ΜL (ref 1.85–7.62)
NEUTS SEG NFR BLD AUTO: 87 % (ref 43–75)
NITRITE UR QL STRIP: NEGATIVE
NON-SQ EPI CELLS URNS QL MICRO: ABNORMAL /HPF
NRBC BLD AUTO-RTO: 0 /100 WBCS
PH UR STRIP.AUTO: 7 [PH] (ref 4.5–8)
PLATELET # BLD AUTO: 370 THOUSANDS/UL (ref 149–390)
PMV BLD AUTO: 9.7 FL (ref 8.9–12.7)
POTASSIUM SERPL-SCNC: 3.9 MMOL/L (ref 3.5–5.3)
PROT SERPL-MCNC: 6.5 G/DL (ref 6.4–8.2)
PROT UR STRIP-MCNC: NEGATIVE MG/DL
PROTHROMBIN TIME: 14.4 SECONDS (ref 12.1–14.4)
RBC # BLD AUTO: 3.06 MILLION/UL (ref 3.81–5.12)
RBC #/AREA URNS AUTO: ABNORMAL /HPF
SODIUM SERPL-SCNC: 138 MMOL/L (ref 136–145)
SP GR UR STRIP.AUTO: 1.01 (ref 1–1.03)
UROBILINOGEN UR QL STRIP.AUTO: 1 E.U./DL
WBC # BLD AUTO: 9.24 THOUSAND/UL (ref 4.31–10.16)
WBC #/AREA URNS AUTO: ABNORMAL /HPF

## 2018-04-25 PROCEDURE — 81001 URINALYSIS AUTO W/SCOPE: CPT | Performed by: PHYSICIAN ASSISTANT

## 2018-04-25 PROCEDURE — 83605 ASSAY OF LACTIC ACID: CPT | Performed by: PHYSICIAN ASSISTANT

## 2018-04-25 PROCEDURE — 96374 THER/PROPH/DIAG INJ IV PUSH: CPT

## 2018-04-25 PROCEDURE — 80048 BASIC METABOLIC PNL TOTAL CA: CPT | Performed by: PHYSICIAN ASSISTANT

## 2018-04-25 PROCEDURE — 85610 PROTHROMBIN TIME: CPT | Performed by: PHYSICIAN ASSISTANT

## 2018-04-25 PROCEDURE — 87086 URINE CULTURE/COLONY COUNT: CPT | Performed by: PHYSICIAN ASSISTANT

## 2018-04-25 PROCEDURE — 80076 HEPATIC FUNCTION PANEL: CPT | Performed by: PHYSICIAN ASSISTANT

## 2018-04-25 PROCEDURE — 85730 THROMBOPLASTIN TIME PARTIAL: CPT | Performed by: PHYSICIAN ASSISTANT

## 2018-04-25 PROCEDURE — 74177 CT ABD & PELVIS W/CONTRAST: CPT

## 2018-04-25 PROCEDURE — 85025 COMPLETE CBC W/AUTO DIFF WBC: CPT | Performed by: PHYSICIAN ASSISTANT

## 2018-04-25 PROCEDURE — 87186 SC STD MICRODIL/AGAR DIL: CPT | Performed by: PHYSICIAN ASSISTANT

## 2018-04-25 PROCEDURE — 96361 HYDRATE IV INFUSION ADD-ON: CPT

## 2018-04-25 PROCEDURE — 83690 ASSAY OF LIPASE: CPT | Performed by: PHYSICIAN ASSISTANT

## 2018-04-25 PROCEDURE — 99284 EMERGENCY DEPT VISIT MOD MDM: CPT

## 2018-04-25 PROCEDURE — 36415 COLL VENOUS BLD VENIPUNCTURE: CPT | Performed by: PHYSICIAN ASSISTANT

## 2018-04-25 RX ORDER — LACTULOSE 10 G/15ML
20 SOLUTION ORAL 3 TIMES DAILY PRN
Qty: 240 ML | Refills: 0 | Status: ON HOLD | OUTPATIENT
Start: 2018-04-25 | End: 2018-04-27 | Stop reason: CLARIF

## 2018-04-25 RX ORDER — ONDANSETRON 2 MG/ML
4 INJECTION INTRAMUSCULAR; INTRAVENOUS ONCE
Status: COMPLETED | OUTPATIENT
Start: 2018-04-25 | End: 2018-04-25

## 2018-04-25 RX ADMIN — ONDANSETRON 4 MG: 2 INJECTION INTRAMUSCULAR; INTRAVENOUS at 06:54

## 2018-04-25 RX ADMIN — IOHEXOL 100 ML: 350 INJECTION, SOLUTION INTRAVENOUS at 07:27

## 2018-04-25 RX ADMIN — SODIUM CHLORIDE 1000 ML: 0.9 INJECTION, SOLUTION INTRAVENOUS at 06:59

## 2018-04-25 NOTE — ED PROVIDER NOTES
History  Chief Complaint   Patient presents with    Abdominal Pain     post back surgical pt   arrived by ems with c/o abdominal pain and no BM in 2 days  [facility] releif     81-year-old female with past medical history significant for chronic pyuria, hypertension, hyperlipidemia and recent L1 vertebral compression fracture surgery presents to the emergency department with chief complaint of abdominal pain  Onset of symptoms reported as last night  Location of symptoms reported as the abdomen  Quality is reported as sharp pain  Severity is reported as severe listed as 9/10 on the pain scale  Associated symptoms:  Denies nausea or vomiting  Positive for constipation  Denies diarrhea  Denies fevers  Denies urinary retention  Denies bowel or bladder incontinence  Modifying factors:  Patient reports drinking hot liquids seems to alleviate symptoms somewhat  Context:  Denies recent fall injury or trauma  Patient reports she had a partial bowel movement this morning which did not relieve her pain  She denies prior similar episodes in the past   She reports she was just released from the hospital 2 days ago after her spinal surgery  She reports no bowel movements for 2 days except small one this morning  Tried colace at home without relief  Past surgical history remarkable for appendectomy as a child  Medical summary:  Reviewed past visits via [facility]:  No prior visits to this emergency department  History provided by:  Patient and EMS personnel   used: No    Abdominal Pain   Associated symptoms: constipation    Associated symptoms: no chest pain, no chills, no cough, no diarrhea, no dysuria, no fatigue, no fever, no hematuria, no nausea, no shortness of breath, no sore throat, no vaginal bleeding, no vaginal discharge and no vomiting        Prior to Admission Medications   Prescriptions Last Dose Informant Patient Reported? Taking?    Calcium Carbonate-Vitamin D3 600-400 MG-UNIT TABS  Self Yes No   Si tab   aspirin 81 mg chewable tablet  Self Yes No   Sig: Chew 1 tablet daily   atenolol (TENORMIN) 25 mg tablet  Self Yes No   Si mg daily     atorvastatin (LIPITOR) 40 mg tablet  Self Yes No   Si mg daily     multivitamin (THERAGRAN) TABS  Self Yes No   Sig: Take 1 tablet by mouth   nitrofurantoin (MACROBID) 100 mg capsule  Self No No   Sig: Take 1 capsule (100 mg total) by mouth daily      Facility-Administered Medications: None       Past Medical History:   Diagnosis Date    Alopecia     Cardiac arrhythmia     Compression fracture of L1 lumbar vertebra (HCC)     resolved 2014    Occlusion of carotid artery     unspecified laterality resolved 2016    Sebaceous cyst        Past Surgical History:   Procedure Laterality Date    APPENDECTOMY      COLONOSCOPY      resolved     ESOPHAGOGASTRODUODENOSCOPY      mild gastritis resolved     THROMBOENDARTERECTOMY Left     carotid, resloved 2012    TONSILLECTOMY         Family History   Problem Relation Age of Onset    No Known Problems Mother      I have reviewed and agree with the history as documented  Social History   Substance Use Topics    Smoking status: Never Smoker    Smokeless tobacco: Never Used    Alcohol use Yes      Comment: rarely (history)         Review of Systems   Constitutional: Negative for activity change, appetite change, chills, diaphoresis, fatigue, fever and unexpected weight change  HENT: Negative for congestion, dental problem, drooling, ear discharge, ear pain, facial swelling, hearing loss, mouth sores, nosebleeds, postnasal drip, rhinorrhea, sinus pain, sinus pressure, sneezing, sore throat, tinnitus, trouble swallowing and voice change  Eyes: Negative for photophobia, pain, discharge, redness, itching and visual disturbance  Respiratory: Negative for cough, choking, chest tightness, shortness of breath, wheezing and stridor  Cardiovascular: Negative for chest pain, palpitations and leg swelling  Gastrointestinal: Positive for abdominal pain and constipation  Negative for abdominal distention, anal bleeding, blood in stool, diarrhea, nausea, rectal pain and vomiting  Endocrine: Negative for cold intolerance, heat intolerance, polydipsia, polyphagia and polyuria  Genitourinary: Negative for decreased urine volume, difficulty urinating, dysuria, flank pain, frequency, hematuria, pelvic pain, urgency, vaginal bleeding, vaginal discharge and vaginal pain  Musculoskeletal: Positive for back pain  Negative for arthralgias, gait problem, joint swelling, myalgias, neck pain and neck stiffness  Skin: Negative for color change, pallor, rash and wound  Allergic/Immunologic: Negative for environmental allergies, food allergies and immunocompromised state  Neurological: Negative for dizziness, tremors, seizures, syncope, facial asymmetry, speech difficulty, weakness, light-headedness, numbness and headaches  Hematological: Negative for adenopathy  Does not bruise/bleed easily  Psychiatric/Behavioral: Negative for agitation, confusion, decreased concentration, hallucinations, self-injury and sleep disturbance  The patient is not nervous/anxious  All other systems reviewed and are negative  Physical Exam  ED Triage Vitals [04/25/18 0546]   Temperature Pulse Respirations Blood Pressure SpO2   98 2 °F (36 8 °C) 76 18 132/64 98 %      Temp Source Heart Rate Source Patient Position - Orthostatic VS BP Location FiO2 (%)   Oral Monitor Sitting Right arm --      Pain Score       9           Orthostatic Vital Signs  Vitals:    04/25/18 0546 04/25/18 0815   BP: 132/64 138/53   Pulse: 76 92   Patient Position - Orthostatic VS: Sitting Lying       Physical Exam   Constitutional: She is oriented to person, place, and time  She appears well-developed and well-nourished  No distress     @/64 (BP Location: Right arm)   Pulse 76 Temp 98 2 °F (36 8 °C) (Oral)   Resp 18   Ht 5' 7" (1 702 m)   Wt 76 7 kg (169 lb 2 oz)   SpO2 98%   BMI 26 49 kg/m²    HENT:   Head: Normocephalic and atraumatic  Right Ear: External ear normal    Left Ear: External ear normal    Nose: Nose normal    Mouth/Throat: Oropharynx is clear and moist  No oropharyngeal exudate  Eyes: Conjunctivae and EOM are normal  Pupils are equal, round, and reactive to light  Right eye exhibits no discharge  Left eye exhibits no discharge  No scleral icterus  Neck: Normal range of motion  Neck supple  No JVD present  No tracheal deviation present  Cardiovascular: Normal rate, S1 normal, S2 normal and intact distal pulses  Pulmonary/Chest: Effort normal and breath sounds normal  No stridor  No respiratory distress  She has no wheezes  She has no rales  She exhibits no tenderness  Abdominal: Soft  Bowel sounds are normal  She exhibits no distension and no mass  There is tenderness  There is no rebound and no guarding  No hernia  Musculoskeletal: Normal range of motion  She exhibits no edema, tenderness or deformity  Lymphadenopathy:     She has no cervical adenopathy  Neurological: She is alert and oriented to person, place, and time  She displays normal reflexes  No cranial nerve deficit or sensory deficit  She exhibits normal muscle tone  Coordination normal    Skin: Skin is warm and dry  Capillary refill takes less than 2 seconds  No rash noted  She is not diaphoretic  No erythema  No pallor  Psychiatric: She has a normal mood and affect  Her behavior is normal  Judgment and thought content normal    Nursing note and vitals reviewed        ED Medications  Medications   sodium chloride 0 9 % bolus 1,000 mL (0 mL Intravenous Stopped 4/25/18 1002)   ondansetron (ZOFRAN) injection 4 mg (4 mg Intravenous Given 4/25/18 9154)   iohexol (OMNIPAQUE) 350 MG/ML injection (MULTI-DOSE) 100 mL (100 mL Intravenous Given 4/25/18 1127)       Diagnostic Studies  Results Reviewed     Procedure Component Value Units Date/Time    Urine Microscopic [[id number]]  (Abnormal) Collected:  04/25/18 0839    Lab Status:  Final result Specimen:  Urine from Urine, Clean Catch Updated:  04/25/18 0907     RBC, UA None Seen /hpf      WBC, UA Innumerable (A) /hpf      Epithelial Cells Occasional /hpf      Bacteria, UA Innumerable (A) /hpf     Urine culture [[id number]] Collected:  04/25/18 0839    Lab Status: In process Specimen:  Urine from Urine, Clean Catch Updated:  04/25/18 0907    UA w Reflex to Microscopic w Reflex to Culture [[id number]]  (Abnormal) Collected:  04/25/18 0839    Lab Status:  Final result Specimen:  Urine from Urine, Clean Catch Updated:  04/25/18 0849     Color, UA Yellow     Clarity, UA Cloudy     Specific [place], UA 1 010     pH, UA 7 0     Leukocytes, UA Moderate (A)     Nitrite, UA Negative     Protein, UA Negative mg/dl      Glucose, UA Negative mg/dl      Ketones, UA Negative mg/dl      Urobilinogen, UA 1 0 E U /dl      Bilirubin, UA Negative     Blood, UA Negative    Protime-INR [[id number]]  (Normal) Collected:  04/25/18 0749    Lab Status:  Final result Specimen:  Blood from Arm, Right Updated:  04/25/18 0808     Protime 14 4 seconds      INR 1 09    APTT [[id number]]  (Normal) Collected:  04/25/18 0749    Lab Status:  Final result Specimen:  Blood from Arm, Right Updated:  04/25/18 0808     PTT 30 seconds     Lactic acid, plasma [[id number]]  (Normal) Collected:  04/25/18 0651    Lab Status:  Final result Specimen:  Blood from Arm, Right Updated:  04/25/18 7862     LACTIC ACID 1 6 mmol/L     Narrative:         Result may be elevated if tourniquet was used during collection      Basic metabolic panel [[id number]]  (Abnormal) Collected:  04/25/18 0651    Lab Status:  Final result Specimen:  Blood from Arm, Right Updated:  04/25/18 0717     Sodium 138 mmol/L      Potassium 3 9 mmol/L      Chloride 101 mmol/L      CO2 28 mmol/L      Anion Gap 9 mmol/L      BUN 16 mg/dL Creatinine 0 98 mg/dL      Glucose 134 mg/dL      Calcium 10 2 (H) mg/dL      eGFR 54 ml/min/1 73sq m     Narrative:         National Kidney Disease Education Program recommendations are as follows:  GFR calculation is accurate only with a steady state creatinine  Chronic Kidney disease less than 60 ml/min/1 73 sq  meters  Kidney failure less than 15 ml/min/1 73 sq  meters  Hepatic function panel [[id number]]  (Abnormal) Collected:  04/25/18 0651    Lab Status:  Final result Specimen:  Blood from Arm, Right Updated:  04/25/18 0717     Total Bilirubin 0 50 mg/dL      Bilirubin, Direct 0 15 mg/dL      Alkaline Phosphatase 123 (H) U/L      AST 19 U/L      ALT 33 U/L      Total Protein 6 5 g/dL      Albumin 2 9 (L) g/dL     Lipase [[id number]]  (Normal) Collected:  04/25/18 0651    Lab Status:  Final result Specimen:  Blood from Arm, Right Updated:  04/25/18 0717     Lipase 115 u/L     CBC and differential [[id number]]  (Abnormal) Collected:  04/25/18 0651    Lab Status:  Final result Specimen:  Blood from Arm, Right Updated:  04/25/18 0712     WBC 9 24 Thousand/uL      RBC 3 06 (L) Million/uL      Hemoglobin 9 2 (L) g/dL      Hematocrit 28 2 (L) %      MCV 92 fL      MCH 30 1 pg      MCHC 32 6 g/dL      RDW 12 9 %      MPV 9 7 fL      Platelets 285 Thousands/uL      nRBC 0 /100 WBCs      Neutrophils Relative 87 (H) %      Lymphocytes Relative 7 (L) %      Monocytes Relative 4 %      Eosinophils Relative 1 %      Basophils Relative 0 %      Neutrophils Absolute 8 01 (H) Thousands/µL      Lymphocytes Absolute 0 68 Thousands/µL      Monocytes Absolute 0 39 Thousand/µL      Eosinophils Absolute 0 08 Thousand/µL      Basophils Absolute 0 03 Thousands/µL                  CT abdomen pelvis with contrast   Final Result by Tobi Ferrera DO (04/25 5253)   1  Moderate to severe colonic distention up to the level of the mid sigmoid colon  Distally, the colon is decompressed    There is mild wall thickening of the mid sigmoid colon  Although the findings likely represent peristalsis, colonic neoplasm not    excluded  Follow-up colonoscopy recommended  2   Moderate to severe constipation with colonic distention  3   Postoperative changes lumbar spine  The study was marked in [facility] for immediate notification  Workstation performed: [id number]                    Procedures  Procedures       Phone Contacts  ED Phone Contact    ED Course  ED Course as of Apr 25 1017   Wed Apr 25, 2018   0717 Lab results reviewed -  CBC remarkable for hgb low at 9 2, hct low at 28 2  MCV normal at 92       0717 Labs reviewed:  Basic metabolic panel remarkable only for calcium mildly elevated at 10 2 otherwise unremarkable  Hepatic function panel remarkable for alkaline phosphatase elevated at 123 albumin low at 2 9 otherwise unremarkable  9262 Updated patient regarding ct and lab results  2847 Updated patient regarding ct and lab results  Spoke with general surgery via telephone - currently in OR/requested ED consult on patient based on CT scan results  12 Spoke with general surgery regarding case recommend bowel regiment and can admit to medicine  Discussed options with patient who does not wish to be admitted and would like trial of treatment at home  MDM  Number of Diagnoses or Management Options  Diagnosis management comments: ddx includes but is not limited to:   Gastritis  Bowel obstruction  Ileus  Gastroenteritis  pancreatitis  Cholecystitis  Appendicitis  PUD  Duodenal ulcer  UTI  Pyelonephritis  Kidney stone  Colitis  Diverticulitis  Hepatitis    Plan workup including check cbc, cmp, ua, iv fluids, ct abd/pelvis  I expect urine to show pyuria as this is chronic for patient  Lab results reviewed  Urinalysis remarkable for moderate leukocytes  Negative for blood and negative for nitrites  Innumerable bacteria noted on microscopic consistent with chronic pyuria    Will not treat secondary to past history  CT abdomen and pelvis images independently visualized by me  Radiology report was reviewed:1  Pattricia Garden to severe colonic distention up to the level of the mid sigmoid colon   Distally, the colon is decompressed   There is mild wall thickening of the mid sigmoid colon   Although the findings likely represent peristalsis, colonic neoplasm not   excluded   Follow-up colonoscopy recommended  2   Moderate to severe constipation with colonic distention  3   Postoperative changes lumbar spine  Discussed CT scan results lab results with patient at bedside  She feels improved during her time in the emergency department  Suspect constipation likely secondary to recent surgery, use of opiates  Will start patient on bowel regimen including lactulose and Senokot  Offered patient admission for treatment of symptoms but patient declined  She will contact her pcp tomorrow for short term follow up  I will give her gastroenterology follow-up as outpatient  Extensive discussion of treatment plan  Discussed with patient should her symptoms worsen she should return to the emergency department for repeat evaluation  She verbalized understanding and agreement with same         Amount and/or Complexity of Data Reviewed  Clinical lab tests: ordered and reviewed  Tests in the radiology section of CPT®: ordered and reviewed  Discussion of test results with the performing providers: yes  Obtain history from someone other than the patient: yes (Significant other )  Review and summarize past medical records: yes  Independent visualization of images, tracings, or specimens: yes    Patient Progress  Patient progress: improved    CritCare Time    Disposition  Final diagnoses:   Constipation   Abnormal CT of the abdomen     Time reflects when diagnosis was documented in both MDM as applicable and the Disposition within this note     Time User Action Codes Description Comment    4/25/2018 10:12 AM Shraddha White Juan Joelo Add [K59 00] Constipation     4/25/2018 10:12 AM Sara Sandoval Add [R93 5] Abnormal CT of the abdomen       ED Disposition     ED Disposition Condition Comment    Discharge  Celia Cease discharge to home/self care  Condition at discharge: Stable        Follow-up Information     Follow up With Specialties Details Why Contact Info Additional Information    Aleta Hunt MD Internal Medicine Call in 1 day for further evaluation of symptoms [address] [facility] Sandrine Cho MD Gastroenterology Call in 2 days for further evaluation of symptoms [address] [place] [address] [phone]       [facility] Emergency Department Emergency Medicine Go to If symptoms worsen [address] [facility] [address] [phone] [place] ED, [address] [place], [place], [address]        Patient's Medications   Discharge Prescriptions    LACTULOSE ([facility]) 10 G/15 ML SOLUTION    Take 30 mL (20 g total) by mouth 3 (three) times a day as needed (constipation) for up to 5 days       Start Date: 4/25/2018 End Date: 4/30/2018       Order Dose: 20 g       Quantity: 240 mL    Refills: 0    SENNOSIDES 17 2 MG TABS    Take 1 tablet (17 2 mg total) by mouth 2 (two) times a day for 7 days       Start Date: 4/25/2018 End Date: 5/2/2018       Order Dose: 17 2 mg       Quantity: 14 each    Refills: 0     No discharge procedures on file      ED Provider  Electronically Signed by           Sundeep Blackmon PA-C  04/25/18 2455

## 2018-04-25 NOTE — DISCHARGE INSTRUCTIONS
Constipation   WHAT YOU NEED TO KNOW:   Constipation is when you have hard, dry bowel movements, or you go longer than usual between bowel movements  DISCHARGE INSTRUCTIONS:   Seek care immediately if:   · You have blood in your bowel movements  · You have a fever and abdominal pain with the constipation  Contact your healthcare provider if:   · Your constipation gets worse  · You start to vomit  · You have questions or concerns about your condition or care  Medicines:   · Medicine or a fiber supplement  may help make your bowel movement softer  A laxative may help relax and loosen your intestines to help you have a bowel movement  You may also be given medicine to increase fluid in your intestines  The fluid may help move bowel movements through your intestines  · Take your medicine as directed  Contact your healthcare provider if you think your medicine is not helping or if you have side effects  Tell him of her if you are allergic to any medicine  Keep a list of the medicines, vitamins, and herbs you take  Include the amounts, and when and why you take them  Bring the list or the pill bottles to follow-up visits  Carry your medicine list with you in case of an emergency  Manage your constipation:   · Drink liquids as directed  You may need to drink extra liquids to help soften and move your bowels  Ask how much liquid to drink each day and which liquids are best for you  · Eat high-fiber foods  This may help decrease constipation by adding bulk to your bowel movements  High-fiber foods include fruit, vegetables, whole-grain breads and cereals, and beans  Your healthcare provider or dietitian can help you create a high-fiber meal plan  · Exercise regularly  Regular physical activity can help stimulate your intestines  Ask which exercises are best for you  · Schedule a time each day to have a bowel movement  This may help train your body to have regular bowel movements   [facility] forward while you are on the toilet to help move the bowel movement out  Sit on the toilet for at least 10 minutes, even if you do not have a bowel movement  Follow up with your healthcare provider as directed:  Write down your questions so you remember to ask them during your visits  © 2017 [facility] Information is for End User's use only and may not be sold, redistributed or otherwise used for commercial purposes  All illustrations and images included in CareNotes® are the copyrighted property of [employer]  or Alfonzo Mendoza  The above information is an  only  It is not intended as medical advice for individual conditions or treatments  Talk to your doctor, nurse or pharmacist before following any medical regimen to see if it is safe and effective for you

## 2018-04-26 ENCOUNTER — TELEPHONE (OUTPATIENT)
Dept: INTERNAL MEDICINE CLINIC | Facility: CLINIC | Age: 83
End: 2018-04-26

## 2018-04-26 ENCOUNTER — APPOINTMENT (EMERGENCY)
Dept: CT IMAGING | Facility: HOSPITAL | Age: 83
End: 2018-04-26
Payer: MEDICARE

## 2018-04-26 ENCOUNTER — HOSPITAL ENCOUNTER (OUTPATIENT)
Facility: HOSPITAL | Age: 83
Setting detail: OBSERVATION
LOS: 1 days | Discharge: HOME | End: 2018-04-27
Attending: EMERGENCY MEDICINE | Admitting: INTERNAL MEDICINE
Payer: MEDICARE

## 2018-04-26 ENCOUNTER — OFFICE VISIT (OUTPATIENT)
Dept: INTERNAL MEDICINE CLINIC | Facility: CLINIC | Age: 83
End: 2018-04-26
Payer: MEDICARE

## 2018-04-26 VITALS — DIASTOLIC BLOOD PRESSURE: 54 MMHG | SYSTOLIC BLOOD PRESSURE: 122 MMHG | HEART RATE: 81 BPM | OXYGEN SATURATION: 95 %

## 2018-04-26 DIAGNOSIS — D64.9 ANEMIA: ICD-10-CM

## 2018-04-26 DIAGNOSIS — K56.49 OTHER IMPACTION OF INTESTINE (HCC): Primary | ICD-10-CM

## 2018-04-26 DIAGNOSIS — M19.90 ARTHRITIS: ICD-10-CM

## 2018-04-26 DIAGNOSIS — K56.41 FECAL IMPACTION (HCC): ICD-10-CM

## 2018-04-26 DIAGNOSIS — K59.00 CONSTIPATION: ICD-10-CM

## 2018-04-26 PROBLEM — R10.9 ABDOMINAL PAIN: Status: ACTIVE | Noted: 2018-04-26

## 2018-04-26 LAB
ALBUMIN SERPL BCP-MCNC: 3 G/DL (ref 3.5–5)
ALP SERPL-CCNC: 128 U/L (ref 46–116)
ALT SERPL W P-5'-P-CCNC: 30 U/L (ref 12–78)
ANION GAP SERPL CALCULATED.3IONS-SCNC: 10 MMOL/L (ref 4–13)
APTT PPP: 28 SECONDS (ref 23–35)
AST SERPL W P-5'-P-CCNC: 18 U/L (ref 5–45)
BASOPHILS # BLD MANUAL: 0 THOUSAND/UL (ref 0–0.1)
BASOPHILS NFR MAR MANUAL: 0 % (ref 0–1)
BILIRUB SERPL-MCNC: 0.5 MG/DL (ref 0.2–1)
BUN SERPL-MCNC: 20 MG/DL (ref 5–25)
CALCIUM SERPL-MCNC: 9.8 MG/DL (ref 8.3–10.1)
CHLORIDE SERPL-SCNC: 102 MMOL/L (ref 100–108)
CO2 SERPL-SCNC: 26 MMOL/L (ref 21–32)
CREAT SERPL-MCNC: 0.98 MG/DL (ref 0.6–1.3)
EOSINOPHIL # BLD MANUAL: 0 THOUSAND/UL (ref 0–0.4)
EOSINOPHIL NFR BLD MANUAL: 0 % (ref 0–6)
ERYTHROCYTE [DISTWIDTH] IN BLOOD BY AUTOMATED COUNT: 12.9 % (ref 11.6–15.1)
GFR SERPL CREATININE-BSD FRML MDRD: 54 ML/MIN/1.73SQ M
GLUCOSE SERPL-MCNC: 141 MG/DL (ref 65–140)
HCT VFR BLD AUTO: 27 % (ref 34.8–46.1)
HGB BLD-MCNC: 8.8 G/DL (ref 11.5–15.4)
INR PPP: 1.08 (ref 0.86–1.16)
LACTATE SERPL-SCNC: 1.1 MMOL/L (ref 0.5–2)
LIPASE SERPL-CCNC: 72 U/L (ref 73–393)
LYMPHOCYTES # BLD AUTO: 0.36 THOUSAND/UL (ref 0.6–4.47)
LYMPHOCYTES # BLD AUTO: 4 % (ref 14–44)
MCH RBC QN AUTO: 30 PG (ref 26.8–34.3)
MCHC RBC AUTO-ENTMCNC: 32.6 G/DL (ref 31.4–37.4)
MCV RBC AUTO: 92 FL (ref 82–98)
MONOCYTES # BLD AUTO: 0.27 THOUSAND/UL (ref 0–1.22)
MONOCYTES NFR BLD: 3 % (ref 4–12)
NEUTROPHILS # BLD MANUAL: 8.31 THOUSAND/UL (ref 1.85–7.62)
NEUTS SEG NFR BLD AUTO: 93 % (ref 43–75)
NRBC BLD AUTO-RTO: 0 /100 WBCS
PLATELET # BLD AUTO: 375 THOUSANDS/UL (ref 149–390)
PLATELET BLD QL SMEAR: ABNORMAL
PMV BLD AUTO: 9.4 FL (ref 8.9–12.7)
POTASSIUM SERPL-SCNC: 4 MMOL/L (ref 3.5–5.3)
PROT SERPL-MCNC: 6.5 G/DL (ref 6.4–8.2)
PROTHROMBIN TIME: 14.2 SECONDS (ref 12.1–14.4)
RBC # BLD AUTO: 2.93 MILLION/UL (ref 3.81–5.12)
SODIUM SERPL-SCNC: 138 MMOL/L (ref 136–145)
TOTAL CELLS COUNTED SPEC: 100
WBC # BLD AUTO: 8.94 THOUSAND/UL (ref 4.31–10.16)

## 2018-04-26 PROCEDURE — 99219 PR INITIAL OBSERVATION CARE/DAY 50 MINUTES: CPT | Performed by: INTERNAL MEDICINE

## 2018-04-26 PROCEDURE — 99285 EMERGENCY DEPT VISIT HI MDM: CPT

## 2018-04-26 PROCEDURE — 36415 COLL VENOUS BLD VENIPUNCTURE: CPT | Performed by: PHYSICIAN ASSISTANT

## 2018-04-26 PROCEDURE — 96360 HYDRATION IV INFUSION INIT: CPT

## 2018-04-26 PROCEDURE — 80053 COMPREHEN METABOLIC PANEL: CPT | Performed by: PHYSICIAN ASSISTANT

## 2018-04-26 PROCEDURE — 83690 ASSAY OF LIPASE: CPT | Performed by: PHYSICIAN ASSISTANT

## 2018-04-26 PROCEDURE — 85027 COMPLETE CBC AUTOMATED: CPT | Performed by: PHYSICIAN ASSISTANT

## 2018-04-26 PROCEDURE — 85610 PROTHROMBIN TIME: CPT | Performed by: PHYSICIAN ASSISTANT

## 2018-04-26 PROCEDURE — 74177 CT ABD & PELVIS W/CONTRAST: CPT

## 2018-04-26 PROCEDURE — 99214 OFFICE O/P EST MOD 30 MIN: CPT | Performed by: SURGERY

## 2018-04-26 PROCEDURE — 85730 THROMBOPLASTIN TIME PARTIAL: CPT | Performed by: PHYSICIAN ASSISTANT

## 2018-04-26 PROCEDURE — 99213 OFFICE O/P EST LOW 20 MIN: CPT | Performed by: INTERNAL MEDICINE

## 2018-04-26 PROCEDURE — 85007 BL SMEAR W/DIFF WBC COUNT: CPT | Performed by: PHYSICIAN ASSISTANT

## 2018-04-26 PROCEDURE — 83605 ASSAY OF LACTIC ACID: CPT | Performed by: PHYSICIAN ASSISTANT

## 2018-04-26 RX ORDER — ACETAMINOPHEN 325 MG/1
650 TABLET ORAL EVERY 6 HOURS PRN
Status: DISCONTINUED | OUTPATIENT
Start: 2018-04-26 | End: 2018-04-27 | Stop reason: HOSPADM

## 2018-04-26 RX ORDER — POLYETHYLENE GLYCOL 3350 17 G/17G
17 POWDER, FOR SOLUTION ORAL DAILY
Status: DISCONTINUED | OUTPATIENT
Start: 2018-04-27 | End: 2018-04-27 | Stop reason: HOSPADM

## 2018-04-26 RX ORDER — B-COMPLEX WITH VITAMIN C
1 TABLET ORAL DAILY
Status: DISCONTINUED | OUTPATIENT
Start: 2018-04-27 | End: 2018-04-27 | Stop reason: HOSPADM

## 2018-04-26 RX ORDER — NITROFURANTOIN 25; 75 MG/1; MG/1
100 CAPSULE ORAL DAILY
Status: DISCONTINUED | OUTPATIENT
Start: 2018-04-27 | End: 2018-04-26

## 2018-04-26 RX ORDER — NAPROXEN SODIUM 220 MG
TABLET ORAL
Refills: 0 | Status: ON HOLD | COMMUNITY
Start: 2018-04-25 | End: 2018-04-26 | Stop reason: CLARIF

## 2018-04-26 RX ORDER — AMOXICILLIN 500 MG/1
500 CAPSULE ORAL 3 TIMES DAILY
Refills: 0 | Status: ON HOLD | COMMUNITY
Start: 2018-04-06 | End: 2018-04-26 | Stop reason: CLARIF

## 2018-04-26 RX ORDER — HYDROCODONE BITARTRATE AND ACETAMINOPHEN 5; 325 MG/1; MG/1
TABLET ORAL
Refills: 0 | Status: ON HOLD | COMMUNITY
Start: 2018-04-06 | End: 2018-04-27 | Stop reason: CLARIF

## 2018-04-26 RX ORDER — DOCUSATE SODIUM 100 MG/1
100 CAPSULE, LIQUID FILLED ORAL 2 TIMES DAILY
Status: DISCONTINUED | OUTPATIENT
Start: 2018-04-26 | End: 2018-04-27 | Stop reason: HOSPADM

## 2018-04-26 RX ORDER — BISACODYL 10 MG
10 SUPPOSITORY, RECTAL RECTAL DAILY
Status: DISCONTINUED | OUTPATIENT
Start: 2018-04-27 | End: 2018-04-27 | Stop reason: HOSPADM

## 2018-04-26 RX ORDER — ASPIRIN 81 MG/1
81 TABLET, CHEWABLE ORAL DAILY
Status: DISCONTINUED | OUTPATIENT
Start: 2018-04-27 | End: 2018-04-27 | Stop reason: HOSPADM

## 2018-04-26 RX ORDER — SODIUM CHLORIDE 9 MG/ML
75 INJECTION, SOLUTION INTRAVENOUS CONTINUOUS
Status: DISCONTINUED | OUTPATIENT
Start: 2018-04-26 | End: 2018-04-27

## 2018-04-26 RX ORDER — DIAZEPAM 5 MG/1
5 TABLET ORAL EVERY 6 HOURS PRN
Refills: 0 | Status: ON HOLD | COMMUNITY
Start: 2018-04-24 | End: 2018-04-27 | Stop reason: CLARIF

## 2018-04-26 RX ORDER — ONDANSETRON 2 MG/ML
4 INJECTION INTRAMUSCULAR; INTRAVENOUS ONCE
Status: COMPLETED | OUTPATIENT
Start: 2018-04-26 | End: 2018-04-26

## 2018-04-26 RX ORDER — SODIUM PHOSPHATE, DIBASIC AND SODIUM PHOSPHATE, MONOBASIC 7; 19 G/133ML; G/133ML
1 ENEMA RECTAL DAILY PRN
Status: DISCONTINUED | OUTPATIENT
Start: 2018-04-26 | End: 2018-04-27 | Stop reason: HOSPADM

## 2018-04-26 RX ORDER — OXYCODONE HYDROCHLORIDE AND ACETAMINOPHEN 5; 325 MG/1; MG/1
TABLET ORAL
Refills: 0 | COMMUNITY
Start: 2018-04-24 | End: 2018-08-29

## 2018-04-26 RX ORDER — ATENOLOL 25 MG/1
25 TABLET ORAL DAILY
Status: DISCONTINUED | OUTPATIENT
Start: 2018-04-27 | End: 2018-04-27 | Stop reason: HOSPADM

## 2018-04-26 RX ORDER — DIAZEPAM 5 MG/1
5 TABLET ORAL EVERY 6 HOURS PRN
Status: DISCONTINUED | OUTPATIENT
Start: 2018-04-26 | End: 2018-04-27 | Stop reason: HOSPADM

## 2018-04-26 RX ADMIN — ONDANSETRON 4 MG: 2 INJECTION INTRAMUSCULAR; INTRAVENOUS at 17:13

## 2018-04-26 RX ADMIN — DOCUSATE SODIUM 100 MG: 100 CAPSULE, LIQUID FILLED ORAL at 18:51

## 2018-04-26 RX ADMIN — SODIUM CHLORIDE 1000 ML: 0.9 INJECTION, SOLUTION INTRAVENOUS at 12:46

## 2018-04-26 RX ADMIN — IOHEXOL 100 ML: 350 INJECTION, SOLUTION INTRAVENOUS at 12:52

## 2018-04-26 RX ADMIN — SODIUM CHLORIDE 125 ML/HR: 0.9 INJECTION, SOLUTION INTRAVENOUS at 18:38

## 2018-04-26 NOTE — ED NOTES
Patient requesting pain medication for abdominal pain  Physician aware Roque Siemens, SALONI  04/26/18 8723

## 2018-04-26 NOTE — ED NOTES
Dr Kilpatrick Plane at bedside     Yash Townsend, [facility]  04/26/18 2985

## 2018-04-26 NOTE — ED PROVIDER NOTES
History  Chief Complaint   Patient presents with    Abdominal Pain     pt states that she was seen here yesterday and given the option to go home  claims that it has become worse overnight  states that her PCP told her there is an obstruction     27-year-old female with past medical history significant for chronic pyuria, hypertension, hyperlipidemia and recent L1 vertebral compression fracture surgery presents to the emergency department with chief complaint of abdominal pain  Onset of symptoms reported as 2 days ago  Location of symptoms reported as the abdomen  Quality is reported as sharp pain  Severity is reported as severe listed as 9/10 on the pain scale  Associated symptoms:  Positive for nausea and vomiting  Positive for constipation  Denies diarrhea  Denies fevers  Denies urinary retention  Denies bowel or bladder incontinence  Modifying factors:  Patient reports drinking hot liquids seems to alleviate symptoms somewhat  Context:  Denies recent fall injury or trauma  Patient reports she had a partial bowel movement yesterday morning which did not relieve her pain  She denies prior similar episodes in the past   She reports she was just released from the hospital 2 days ago after her spinal surgery  She reports no bowel movements for 3 days except small one yesterday morning  Tried colace at home without relief  Was seen in ED yesterday - had workup which showed large quantity of colonic stool  Patient was offered admission but declined  She opted instead for discharge home with bowel regiment  She returns today stating that after trying medications at home last night she proceeded to vomit up every that she had consumed yesterday  She reports pain and vomiting persist since d/c yesterday which prompted her to return to ED for repeat evaluation as previously instructed  Past surgical history remarkable for appendectomy as a child and recently lumbar fusion  [facility]          History provided by:  Patient, spouse and relative   used: No        Prior to Admission Medications   Prescriptions Last Dose Informant Patient Reported? Taking?    Calcium Carbonate-Vitamin D3 600-400 MG-UNIT TABS  Self Yes Yes   Si tab   HYDROcodone-acetaminophen (NORCO) 5-325 mg per tablet  Self Yes Yes   Sig: take 1/2 to 1 tablet by mouth every 6 hours if needed for pain   RA SENNA 8 6 MG tablet  Self Yes Yes   Sig: take 2 tablets by mouth twice a day for 7 days   Sennosides 17 2 MG TABS  Self No Yes   Sig: Take 1 tablet (17 2 mg total) by mouth 2 (two) times a day for 7 days   amoxicillin (AMOXIL) 500 mg capsule  Self Yes Yes   Sig: Take 500 mg by mouth 3 (three) times a day   aspirin 81 mg chewable tablet  Self Yes Yes   Sig: Chew 1 tablet daily   atenolol (TENORMIN) 25 mg tablet  Self Yes Yes   Si mg daily     diazepam (VALIUM) 5 mg tablet  Self Yes Yes   Sig: Take 5 mg by mouth every 6 (six) hours as needed for muscle spasms   lactulose (CHRONULAC) 10 g/15 mL solution  Self No Yes   Sig: Take 30 mL (20 g total) by mouth 3 (three) times a day as needed (constipation) for up to 5 days   multivitamin (THERAGRAN) TABS  Self Yes Yes   Sig: Take 1 tablet by mouth   nitrofurantoin (MACROBID) 100 mg capsule  Self No Yes   Sig: Take 1 capsule (100 mg total) by mouth daily   oxyCODONE-acetaminophen (PERCOCET) 5-325 mg per tablet  Self Yes Yes   Sig: take 1 to 2 tablets by mouth every 4 to 6 hours if needed for pain      Facility-Administered Medications: None       Past Medical History:   Diagnosis Date    Alopecia     Cardiac arrhythmia     Compression fracture of L1 lumbar vertebra (HCC)     resolved 2014    Occlusion of carotid artery     unspecified laterality resolved 2016    Sebaceous cyst        Past Surgical History:   Procedure Laterality Date    APPENDECTOMY      COLONOSCOPY      resolved     ESOPHAGOGASTRODUODENOSCOPY      mild gastritis resolved     THROMBOENDARTERECTOMY Left     carotid, resloved 12/2012    TONSILLECTOMY         Family History   Problem Relation Age of Onset    No Known Problems Mother      I have reviewed and agree with the history as documented  Social History   Substance Use Topics    Smoking status: Never Smoker    Smokeless tobacco: Never Used    Alcohol use Yes      Comment: rarely (history)         Review of Systems   Constitutional: Negative for activity change, appetite change, chills, diaphoresis, fatigue, fever and unexpected weight change  HENT: Negative for congestion, dental problem, drooling, ear discharge, ear pain, facial swelling, hearing loss, mouth sores, nosebleeds, postnasal drip, rhinorrhea, sinus pain, sinus pressure, sore throat and trouble swallowing  Eyes: Negative for photophobia, pain, discharge, redness, itching and visual disturbance  Respiratory: Negative for cough, choking, chest tightness, shortness of breath, wheezing and stridor  Cardiovascular: Negative for chest pain, palpitations and leg swelling  Gastrointestinal: Positive for abdominal pain, constipation, nausea and vomiting  Negative for abdominal distention, anal bleeding, blood in stool, diarrhea and rectal pain  Endocrine: Negative for cold intolerance, heat intolerance, polydipsia, polyphagia and polyuria  Genitourinary: Negative for decreased urine volume, difficulty urinating, dyspareunia, dysuria, enuresis, flank pain, frequency, genital sores, hematuria, menstrual problem, pelvic pain, urgency, vaginal bleeding, vaginal discharge and vaginal pain  Musculoskeletal: Negative for arthralgias, back pain, gait problem, joint swelling, myalgias, neck pain and neck stiffness  Skin: Positive for wound  Negative for color change, pallor and rash  Allergic/Immunologic: Negative for environmental allergies, food allergies and immunocompromised state     Neurological: Negative for dizziness, tremors, seizures, syncope, facial asymmetry, speech difficulty, weakness, light-headedness, numbness and headaches  Hematological: Negative for adenopathy  Does not bruise/bleed easily  Psychiatric/Behavioral: Negative for agitation, confusion, decreased concentration, hallucinations, self-injury and sleep disturbance  The patient is not nervous/anxious  All other systems reviewed and are negative  Physical Exam  ED Triage Vitals [04/26/18 1112]   Temperature Pulse Respirations Blood Pressure SpO2   97 6 °F (36 4 °C) 80 16 158/62 99 %      Temp Source Heart Rate Source Patient Position - Orthostatic VS BP Location FiO2 (%)   Oral Monitor Sitting Left arm --      Pain Score       9           Orthostatic Vital Signs  Vitals:    04/26/18 1316 04/26/18 1330 04/26/18 1430 04/26/18 1530   BP: 126/58 130/61 (!) 189/79 (!) 173/77   Pulse: 90 93 100 (!) 113   Patient Position - Orthostatic VS:    Sitting       Physical Exam   Constitutional: She is oriented to person, place, and time  She appears well-developed and well-nourished  No distress  @/61   Pulse 93   Temp 97 6 °F (36 4 °C) (Oral)   Resp 18   Wt 72 6 kg (160 lb)   SpO2 96%   BMI 25 06 kg/m²    HENT:   Head: Normocephalic and atraumatic  Right Ear: External ear normal    Left Ear: External ear normal    Nose: Nose normal    Mouth/Throat: Oropharynx is clear and moist  No oropharyngeal exudate  Eyes: Conjunctivae and EOM are normal  Pupils are equal, round, and reactive to light  Right eye exhibits no discharge  Left eye exhibits no discharge  No scleral icterus  Neck: Normal range of motion  Neck supple  No JVD present  No tracheal deviation present  Cardiovascular: Normal rate, S1 normal, S2 normal and intact distal pulses  Pulmonary/Chest: Effort normal and breath sounds normal  No stridor  No respiratory distress  She has no wheezes  She has no rales  She exhibits no tenderness  Abdominal: Bowel sounds are normal  She exhibits distension   She exhibits no mass  There is tenderness  There is no rebound and no guarding  No hernia  Firm distended abdomen   Musculoskeletal: Normal range of motion  She exhibits no edema, tenderness or deformity  Lymphadenopathy:     She has no cervical adenopathy  Neurological: She is alert and oriented to person, place, and time  She displays normal reflexes  No cranial nerve deficit or sensory deficit  She exhibits normal muscle tone  Coordination normal    Skin: Skin is warm and dry  Capillary refill takes less than 2 seconds  No rash noted  She is not diaphoretic  No erythema  No pallor  Low back surgical site  Dressing in place - no surrounding erythema or bleeding or drainage  Psychiatric: She has a normal mood and affect  Her behavior is normal  Judgment and thought content normal    Nursing note and vitals reviewed  ED Medications  Medications   ondansetron (ZOFRAN) injection 4 mg (not administered)   HYDROmorphone (DILAUDID) injection 1 mg (not administered)   sodium chloride 0 9 % bolus 1,000 mL (0 mL Intravenous Stopped 4/26/18 1405)   iohexol (OMNIPAQUE) 350 MG/ML injection (MULTI-DOSE) 100 mL (100 mL Intravenous Given 4/26/18 1252)       Diagnostic Studies  Results Reviewed     Procedure Component Value Units Date/Time    CBC and differential [[id number]]  (Abnormal) Collected:  04/26/18 1246    Lab Status:  Final result Specimen:  Blood from Arm, Right Updated:  04/26/18 1325     WBC 8 94 Thousand/uL      RBC 2 93 (L) Million/uL      Hemoglobin 8 8 (L) g/dL      Hematocrit 27 0 (L) %      MCV 92 fL      MCH 30 0 pg      MCHC 32 6 g/dL      RDW 12 9 %      MPV 9 4 fL      Platelets 058 Thousands/uL      nRBC 0 /100 WBCs     Lactic acid, plasma [[id number]]  (Normal) Collected:  04/26/18 1246    Lab Status:  Final result Specimen:  Blood from Arm, Right Updated:  04/26/18 1313     LACTIC ACID 1 1 mmol/L     Narrative:         Result may be elevated if tourniquet was used during collection      Comprehensive metabolic panel [[id number]]  (Abnormal) Collected:  04/26/18 1246    Lab Status:  Final result Specimen:  Blood from Arm, Right Updated:  04/26/18 1310     Sodium 138 mmol/L      Potassium 4 0 mmol/L      Chloride 102 mmol/L      CO2 26 mmol/L      Anion Gap 10 mmol/L      BUN 20 mg/dL      Creatinine 0 98 mg/dL      Glucose 141 (H) mg/dL      Calcium 9 8 mg/dL      AST 18 U/L      ALT 30 U/L      Alkaline Phosphatase 128 (H) U/L      Total Protein 6 5 g/dL      Albumin 3 0 (L) g/dL      Total Bilirubin 0 50 mg/dL      eGFR 54 ml/min/1 73sq m     Narrative:         National Kidney Disease Education Program recommendations are as follows:  GFR calculation is accurate only with a steady state creatinine  Chronic Kidney disease less than 60 ml/min/1 73 sq  meters  Kidney failure less than 15 ml/min/1 73 sq  meters  Lipase [[id number]]  (Abnormal) Collected:  04/26/18 1246    Lab Status:  Final result Specimen:  Blood from Arm, Right Updated:  04/26/18 1310     Lipase 72 (L) u/L     Protime-INR [[id number]]  (Normal) Collected:  04/26/18 1246    Lab Status:  Final result Specimen:  Blood from Arm, Right Updated:  04/26/18 1306     Protime 14 2 seconds      INR 1 08    APTT [[id number]]  (Normal) Collected:  04/26/18 1246    Lab Status:  Final result Specimen:  Blood from Arm, Right Updated:  04/26/18 1306     PTT 28 seconds                  CT abdomen pelvis with contrast   Final Result by Silvana Gipson DO (04/26 1319)      1  Large quantity of colonic stool is again seen in the colon from the cecum to the proximal sigmoid region with mild diffuse colonic distention  At the sigmoid level there appears to be a mild transition in luminal caliber  There is no focal mass or    inflammatory change clearly evident at this site  Findings could represent constipation, again possibility of a partial colonic obstruction possibly due to stricture or occult pathology cannot be completely excluded    Sigmoidoscopy or single contrast barium enema may be considered  2  Borderline prominence of the endometrial stripe  Correlate with pelvic ultrasound  3  Post surgical and degenerative changes of the spine as above  Workstation performed: [id number]                    Procedures  Procedures       Phone Contacts  ED Phone Contact    ED Course                               MDM  Number of Diagnoses or Management Options  Other impaction of intestine [facility]):   Diagnosis management comments: Differential diagnosis includes but is not limited to bowel obstruction, constipation, ileus, diverticulitis, gastroenteritis, gastritis, cholecystitis, pancreatitis, mesenteric adenitis, kidney stone, pyelonephritis, UTI  Plan workup including labs, ct scan abd/pelvis patient will require admission as she has failed outpatient therapy  CT abdomen and pelvis images visualized by me  Radiology report was reviewed:  ABDOMEN    LOWER CHEST:  No clinically significant abnormality identified in the visualized lower chest   Mitral valve annulus calcification noted  LIVER/BILIARY TREE:  Stable right hepatic lobe hypodensity too small to characterize   No intrahepatic biliary dilatation  GALLBLADDER:  No calcified gallstones  No pericholecystic inflammatory change  SPLEEN:  Unremarkable  PANCREAS:  Unremarkable  ADRENAL GLANDS:  Unremarkable  KIDNEYS/URETERS:  One or more sharply circumscribed subcentimeter renal hypodensities are noted  These lesions are too small to accurately characterize, but are statistically most likely to represent benign cortical renal cyst(s)   According to the   guidelines published in the [facility] Paper of the ACR Incidental Findings Committee (Radiology 2010), no further workup of these lesions is recommended   No hydronephrosis  STOMACH AND BOWEL:    The stomach is largely collapsed, evaluation limited  The small bowel is normal caliber    A large quantity of colonic stool is again seen in the colon from the cecum to the proximal sigmoid with mild colonic distention throughout this region   At this level there appears to be a mild transition in luminal caliber  Crispin Peck is no focal mass or   inflammatory change clearly evident at this site   Findings could represent constipation, again possibility of a partial colonic obstruction possibly due to stricture or occult pathology cannot be completely excluded  APPENDIX:  No findings to suggest appendicitis  ABDOMINOPELVIC CAVITY:  No ascites or free intraperitoneal air  No lymphadenopathy  VESSELS: Atherosclerotic changes abdominal aorta without evidence of aneurysm  PELVIS    REPRODUCTIVE ORGANS: Crispin Peck is suggestion of slight endometrial prominence at 5 mm   Pessary has been removed  URINARY BLADDER:  Contrast material throughout the bladder without filling defects  ABDOMINAL WALL/INGUINAL REGIONS:  Unremarkable  OSSEOUS STRUCTURES:  No acute fracture or destructive osseous lesion  Defect posterior and central right iliac bone, presumably from previous biopsy or surgery  Posterior fusion L3-L5 with interposed disc graft material   Grade 1 anterolisthesis L4 and L5 again noted with chronic inferior endplate deformity of L1   Small fluid collection in the subcutaneous fat overlying the lower back again seen measuring 6 6 x   2 4 cm, possibly seroma   No rim-enhancing or internal air to suggest abscess  Multilevel degenerative changes of the spine with vacuum disc phenomenon at L1-2 and L2-3  Lab results reviewed  CBC remarkable for hemoglobin 8 8 and hematocrit 27 0 which are low  White blood cell count normal at 8 9  Comprehensive metabolic panel reviewed remarkable for glucose elevated at 141, alk-phos elevated at 128  Lipase low at 72  Lactic acid normal at 1 1    INR normal 1 0     1427  Paged general surgery regarding admission - Dr Evelyne Bass currently in OR case, requested he contact ED at conclusion of case of evaluated patient for admission     1650:  Patient seen and evaluated in the emergency department by General surgery, Dr Richelle Martinez, felt patient is appropriate for medicine admission as no mechanical obstruction  Will see patient in consult and recommend bowel regimen for constipation  Discussed case with Dr Mery Garcia hospitalist regarding admission   Amount and/or Complexity of Data Reviewed  Clinical lab tests: ordered and reviewed  Tests in the radiology section of CPT®: ordered and reviewed  Discussion of test results with the performing providers: yes  Obtain history from someone other than the patient: yes ( and son at bedside)  Review and summarize past medical records: yes  Discuss the patient with other providers: yes  Independent visualization of images, tracings, or specimens: yes    Patient Progress  Patient progress: stable    CritCare Time    Disposition  Final diagnoses:   Other impaction of intestine ([facility] [address] )   Constipation     Time reflects when diagnosis was documented in both MDM as applicable and the Disposition within this note     Time User Action Codes Description Comment    4/26/2018  4:51 PM Madina Quintana Add [K56 49] Other impaction of intestine ([facility] [address] )     4/26/2018  4:51 PM Alcester Bachelor Modify [K56 49] Other impaction of intestine ([facility] [address] )     4/26/2018  4:51 PM Alcester Bachelor Modify [K56 49] Other impaction of intestine ([facility] [address] )     4/26/2018  4:52 PM Crystal Turk, [address] [K59 00] Constipation       ED Disposition     ED Disposition Condition Comment    Admit  Case was discussed with Dr Sofia Davila the patient's admission status was agreed to be Admission Status: inpatient status to the service of Dr Eric Posada   Follow-up Information    None       Patient's Medications   Discharge Prescriptions    No medications on file     No discharge procedures on file      ED Provider  Electronically Signed by           Iva Hood PA-C  04/26/18 7231

## 2018-04-26 NOTE — Clinical Note
Case was discussed with Dr Shonna Ritter and the patient's admission status was agreed to be Admission Status: inpatient status to the service of Dr Shonna Ritter

## 2018-04-26 NOTE — PATIENT INSTRUCTIONS
Abdominal pain, constipation, no bowel movement, now vomiting    Impression:  Intestinal obstruction  Plan admit to the hospital

## 2018-04-26 NOTE — CONSULTS
GENERAL SURGERY CONSULT       Pete Romero 81 y o  female MRN: [id number]  Unit/Bed#: -01 Encounter: [id number]      Assessment/Plan   Patient Active Problem List   Diagnosis    Arthritis    Hypertension, essential    Combined hyperlipidemia    Pre-operative cardiovascular examination    Microscopic hematuria    Cardiac arrhythmia    Other impaction of intestine (HCC)   Constiptation  Ct shows stool throughout the colon, no obvious sign of mass or external pressure on colon  I do not suspect true obstruction, most likely contipation  Will keep NPO, start aggressive bowel regime  May consider barium enema if no significant improvement to both r/o obstruction and possibly help stimulate function  No plan for any acute surgical intervention  Chief Complaint abd pain, decreased BF    HPI: Pete Romero is a 81y o  year old female who presents with abd pain and decreased bowel movements past several days  Patient is s/p spinal surgery for compression fx  Was started on PO percocet and has had decreased BF since  Was seen yesterday and offered admission with bowel regime, refused and returned today with persistent pain  Persistent flatus, imprvng today  Some nausea and vomiting, none currently          ROS:  12 Point ROS reviewed and negative except for: abd pain, constipation, nausea    Historical Information   Past Medical History:   Diagnosis Date    Alopecia     Cardiac arrhythmia     Compression fracture of L1 lumbar vertebra ([address] )     resolved 09/2014    Occlusion of carotid artery     unspecified laterality resolved 08/05/2016    Sebaceous cyst      Past Surgical History:   Procedure Laterality Date    APPENDECTOMY      COLONOSCOPY      resolved 2009    ESOPHAGOGASTRODUODENOSCOPY      mild gastritis resolved 2009    THROMBOENDARTERECTOMY Left     carotid, resloved 12/2012    TONSILLECTOMY       Social History   History   Alcohol Use    Yes     Comment: rarely (history) History   Drug Use No     History   Smoking Status    Never Smoker   Smokeless Tobacco    Never Used     Family History: no pertinent family history  Allergies   Allergen Reactions    Myrbetriq [Mirabegron] GI Intolerance    Latex Hives and Swelling     Meds/Allergies   all current active meds have been reviewed      Objective   Vitals: Blood pressure (!) 177/77, pulse 101, temperature 97 6 °F (36 4 °C), temperature source Oral, resp  rate 18, weight 72 6 kg (160 lb), SpO2 97 %  ,Body mass index is 25 06 kg/m²  No intake or output data in the 24 hours ending 04/26/18 1815  Invasive Devices     Peripheral Intravenous Line            Peripheral IV 04/26/18 Right Antecubital less than 1 day                Physical Exam:    General appearance: Awake alert oriented no acute distress  HEENT: Sclera clear, anicterus, no discharge  Neck: Trachea is midline, no adenopathy  Lungs: No respiratory distress, clear to auscultation bilaterally  Heart[de-identified] RRR  Abdomen: soft, nondistended, TTP in LLQ, palpable stool filled colon, mild pain  No diffuse pain or rebound     Extremities: No edema, nontender  Skin:No rashes or lesions  Neurologic: No weakness or loss of sensation  Psych: Affect appropriate    Jeannie Blas MD  4/26/2018

## 2018-04-26 NOTE — PROGRESS NOTES
Assessment/Plan:       Diagnoses and all orders for this visit:    Other impaction of intestine ([address] )    Other orders  -     amoxicillin (AMOXIL) 500 mg capsule; Take 500 mg by mouth 3 (three) times a day  -     diazepam (VALIUM) 5 mg tablet; Take 5 mg by mouth every 6 (six) hours as needed for muscle spasms  -     HYDROcodone-acetaminophen (NORCO) 5-325 mg per tablet; take 1/2 to 1 tablet by mouth every 6 hours if needed for pain  -     oxyCODONE-acetaminophen (PERCOCET) 5-325 mg per tablet; take 1 to 2 tablets by mouth every 4 to 6 hours if needed for pain  -     RA SENNA 8 6 MG tablet; take 2 tablets by mouth twice a day for 7 days              Subjective:      Patient ID: Claire David is a 81 y o  female  An elderly patient postop from lumbar surgery presents with a 5 day history of abdominal pain and no bowel movement  The pain is felt in the left lower quadrant  Seen in the ER yesterday  CT documented fecal debris up to the sigmoid with done after  Discharged to follow-up here  Still no bowel movement and now she reports vomiting  At this time, diagnosis is intestinal obstruction and she will be referred back to the ER for admission  The following portions of the patient's history were reviewed and updated as appropriate:   She has a past medical history of Alopecia; Cardiac arrhythmia; Compression fracture of L1 lumbar vertebra ([address] ); Occlusion of carotid artery; and Sebaceous cyst ,   does not have any pertinent problems on file  ,   has a past surgical history that includes Appendectomy; Thromboendarterectomy (Left); Colonoscopy; Esophagogastroduodenoscopy; and Tonsillectomy  ,  family history includes No Known Problems in her mother  ,   reports that she has never smoked  She has never used smokeless tobacco  She reports that she drinks alcohol  She reports that she does not use drugs  ,  is allergic to myrbetriq [mirabegron] and latex     Current Outpatient Prescriptions   Medication Sig Dispense Refill    amoxicillin (AMOXIL) 500 mg capsule Take 500 mg by mouth 3 (three) times a day  0    atenolol (TENORMIN) 25 mg tablet 25 mg daily        Calcium Carbonate-Vitamin D3 600-400 MG-UNIT TABS 1 tab      diazepam (VALIUM) 5 mg tablet Take 5 mg by mouth every 6 (six) hours as needed for muscle spasms  0    HYDROcodone-acetaminophen (NORCO) 5-325 mg per tablet take 1/2 to 1 tablet by mouth every 6 hours if needed for pain  0    lactulose (CHRONULAC) 10 g/15 mL solution Take 30 mL (20 g total) by mouth 3 (three) times a day as needed (constipation) for up to 5 days 240 mL 0    multivitamin (THERAGRAN) TABS Take 1 tablet by mouth      nitrofurantoin (MACROBID) 100 mg capsule Take 1 capsule (100 mg total) by mouth daily 90 capsule 3    oxyCODONE-acetaminophen (PERCOCET) 5-325 mg per tablet take 1 to 2 tablets by mouth every 4 to 6 hours if needed for pain  0    RA SENNA 8 6 MG tablet take 2 tablets by mouth twice a day for 7 days  0    Sennosides 17 2 MG TABS Take 1 tablet (17 2 mg total) by mouth 2 (two) times a day for 7 days 14 each 0    aspirin 81 mg chewable tablet Chew 1 tablet daily       No current facility-administered medications for this visit  Review of Systems   Constitutional: Positive for appetite change and fatigue  Respiratory: Negative for chest tightness  Cardiovascular: Negative for chest pain  Gastrointestinal: Positive for abdominal distention, abdominal pain, constipation and vomiting  Negative for rectal pain  Genitourinary: Negative for difficulty urinating and dysuria  Neurological: Positive for light-headedness  Objective:  Vitals:    04/26/18 1027   BP: 122/54   Pulse: 81   SpO2: 95%      Physical Exam   Constitutional: She appears distressed  An older patient in acute distress from abdominal pain alert  Not toxic in appearance but acute in appearance   Cardiovascular: Normal rate      Pulmonary/Chest: Effort normal  No respiratory distress  Abdominal: She exhibits distension  She exhibits no mass  There is tenderness in the left lower quadrant  There is no rebound and no guarding

## 2018-04-26 NOTE — TELEPHONE ENCOUNTER
SPOKE TO [facility] AS PT  WAS SEEN THIS MORNING AND WAS SENT TO [facility] ER TO BE ADMITTED FOR BOWEL BLOCKAGE

## 2018-04-26 NOTE — ED NOTES
Patient now states she is unsure whether she wants pain medicine    States "pain medicine is what got me into this mess "     Nicole Ornelas, SALONI  04/26/18 6825

## 2018-04-26 NOTE — TELEPHONE ENCOUNTER
Was seen at [facility] er yesterday  Has not had a bowel movement for 4 days  In extreme pain  I made an appt for her today at 10:45  She wanted sooner  Wants someone to call her

## 2018-04-27 ENCOUNTER — APPOINTMENT (OUTPATIENT)
Dept: RADIOLOGY | Facility: HOSPITAL | Age: 83
End: 2018-04-27
Payer: MEDICARE

## 2018-04-27 ENCOUNTER — TRANSITIONAL CARE MANAGEMENT (OUTPATIENT)
Dept: INTERNAL MEDICINE CLINIC | Facility: CLINIC | Age: 83
End: 2018-04-27

## 2018-04-27 VITALS
SYSTOLIC BLOOD PRESSURE: 119 MMHG | BODY MASS INDEX: 25.11 KG/M2 | HEIGHT: 67 IN | TEMPERATURE: 98 F | HEART RATE: 78 BPM | DIASTOLIC BLOOD PRESSURE: 58 MMHG | OXYGEN SATURATION: 96 % | WEIGHT: 160 LBS | RESPIRATION RATE: 16 BRPM

## 2018-04-27 PROBLEM — K56.41 FECAL IMPACTION (HCC): Status: RESOLVED | Noted: 2018-04-26 | Resolved: 2018-04-27

## 2018-04-27 PROBLEM — R10.9 ABDOMINAL PAIN: Status: RESOLVED | Noted: 2018-04-26 | Resolved: 2018-04-27

## 2018-04-27 LAB
ANION GAP SERPL CALCULATED.3IONS-SCNC: 7 MMOL/L (ref 4–13)
BACTERIA UR CULT: ABNORMAL
BUN SERPL-MCNC: 18 MG/DL (ref 5–25)
CALCIUM SERPL-MCNC: 9.4 MG/DL (ref 8.3–10.1)
CHLORIDE SERPL-SCNC: 105 MMOL/L (ref 100–108)
CO2 SERPL-SCNC: 26 MMOL/L (ref 21–32)
CREAT SERPL-MCNC: 0.99 MG/DL (ref 0.6–1.3)
ERYTHROCYTE [DISTWIDTH] IN BLOOD BY AUTOMATED COUNT: 13.2 % (ref 11.6–15.1)
GFR SERPL CREATININE-BSD FRML MDRD: 54 ML/MIN/1.73SQ M
GLUCOSE P FAST SERPL-MCNC: 98 MG/DL (ref 65–99)
GLUCOSE SERPL-MCNC: 98 MG/DL (ref 65–140)
HCT VFR BLD AUTO: 25.2 % (ref 34.8–46.1)
HGB BLD-MCNC: 8.2 G/DL (ref 11.5–15.4)
MCH RBC QN AUTO: 30.3 PG (ref 26.8–34.3)
MCHC RBC AUTO-ENTMCNC: 32.5 G/DL (ref 31.4–37.4)
MCV RBC AUTO: 93 FL (ref 82–98)
PLATELET # BLD AUTO: 356 THOUSANDS/UL (ref 149–390)
PMV BLD AUTO: 9.5 FL (ref 8.9–12.7)
POTASSIUM SERPL-SCNC: 3.2 MMOL/L (ref 3.5–5.3)
RBC # BLD AUTO: 2.71 MILLION/UL (ref 3.81–5.12)
SODIUM SERPL-SCNC: 138 MMOL/L (ref 136–145)
WBC # BLD AUTO: 6.33 THOUSAND/UL (ref 4.31–10.16)

## 2018-04-27 PROCEDURE — 99213 OFFICE O/P EST LOW 20 MIN: CPT | Performed by: SURGERY

## 2018-04-27 PROCEDURE — 80048 BASIC METABOLIC PNL TOTAL CA: CPT | Performed by: INTERNAL MEDICINE

## 2018-04-27 PROCEDURE — 99217 PR OBSERVATION CARE DISCHARGE MANAGEMENT: CPT | Performed by: INTERNAL MEDICINE

## 2018-04-27 PROCEDURE — 85027 COMPLETE CBC AUTOMATED: CPT | Performed by: INTERNAL MEDICINE

## 2018-04-27 PROCEDURE — 74018 RADEX ABDOMEN 1 VIEW: CPT

## 2018-04-27 RX ORDER — BISACODYL 10 MG
10 SUPPOSITORY, RECTAL RECTAL DAILY PRN
Qty: 28 SUPPOSITORY | Refills: 0 | Status: SHIPPED | OUTPATIENT
Start: 2018-04-27 | End: 2018-08-29

## 2018-04-27 RX ORDER — ACETAMINOPHEN 325 MG/1
650 TABLET ORAL EVERY 6 HOURS PRN
Qty: 30 TABLET | Refills: 0
Start: 2018-04-27 | End: 2019-09-16

## 2018-04-27 RX ORDER — POTASSIUM CHLORIDE 20 MEQ/1
40 TABLET, EXTENDED RELEASE ORAL ONCE
Status: COMPLETED | OUTPATIENT
Start: 2018-04-27 | End: 2018-04-27

## 2018-04-27 RX ORDER — POLYETHYLENE GLYCOL 3350 17 G/17G
17 POWDER, FOR SOLUTION ORAL DAILY
Qty: 14 EACH | Refills: 0 | Status: SHIPPED | OUTPATIENT
Start: 2018-04-28 | End: 2018-08-29

## 2018-04-27 RX ORDER — DOCUSATE SODIUM 100 MG/1
100 CAPSULE, LIQUID FILLED ORAL 2 TIMES DAILY
Refills: 0
Start: 2018-04-27 | End: 2019-09-16

## 2018-04-27 RX ADMIN — CALCIUM CARBONATE-VITAMIN D TAB 500 MG-200 UNIT 1 TABLET: 500-200 TAB at 08:24

## 2018-04-27 RX ADMIN — DOCUSATE SODIUM 100 MG: 100 CAPSULE, LIQUID FILLED ORAL at 08:24

## 2018-04-27 RX ADMIN — ATENOLOL 25 MG: 25 TABLET ORAL at 08:24

## 2018-04-27 RX ADMIN — ACETAMINOPHEN 650 MG: 325 TABLET, FILM COATED ORAL at 08:30

## 2018-04-27 RX ADMIN — ASPIRIN 81 MG 81 MG: 81 TABLET ORAL at 08:24

## 2018-04-27 RX ADMIN — POTASSIUM CHLORIDE 40 MEQ: 1500 TABLET, EXTENDED RELEASE ORAL at 11:09

## 2018-04-27 RX ADMIN — Medication 1 TABLET: at 08:24

## 2018-04-27 RX ADMIN — POLYETHYLENE GLYCOL 3350 17 G: 17 POWDER, FOR SOLUTION ORAL at 08:24

## 2018-04-27 RX ADMIN — ACETAMINOPHEN 650 MG: 325 TABLET, FILM COATED ORAL at 14:10

## 2018-04-27 NOTE — DISCHARGE SUMMARY
Discharge Summary - [facility] Internal Medicine    Patient Information: Melinda Price 81 y o  female MRN: [id number]  Unit/Bed#: -01 Encounter: [id number]    Discharging Physician / Practitioner: Vinnie Mckeon DO  PCP: Celia Mackey MD  Admission Date: 4/26/2018  Discharge Date: 04/27/18    Reason for Admission:  Fecal impaction    Discharge Diagnoses:     Principal Problem (Resolved): Abdominal pain  Active Problems:    Anemia  Resolved Problems:    Fecal impaction [facility]    HPI    Melinda Price is a 81 y o  female medical history significant for hypertension, recent spinal surgery on April 12 she was subsequently discharged home on the 24th  She has been on narcotics for pain  She reports that she has not had a bowel movement in 4-5 days  She presents to ER with complaint of abdominal pain  She presents to the ER a day prior, CT scan revealing fecal impaction  Was subsequently discharged home from the ED with bowel regimen  However patient presents back to the ER given still without bowel movement and still persistent abdominal pain pain      Upon presentation to the ER repeat CT scan revealing fecal impaction and questionable partial bowel obstruction, General surgery has since been consulted review of CT scan themselves ruling out obstruction  She has since been given aggressive bowel regimen  Since admission patient has had 2 small bowel movement  She reports of improved abdominal pain  She denies nausea or vomiting  Otherwise she has no postop complications, her wound was recently checked on the 24th with no issues    She denies fever chills, she denies drainage from the wound site  Consultations During Hospital Stay:  · General surgery    Procedures Performed:     · CT scan abdomen pelvis    Significant Findings:     · Refer to hospital course    Incidental Findings:   · None    Test Results Pending at Discharge (will require follow up):   ·      Outpatient Tests Requested:  ·     Complications:  None    Hospital Course:     # Fecal impaction - secondary to opioid use as recent spine surgery  - Seen by gen surgery, no sign of obstruction  -patient was placed on aggressive bowel regimen with resultant bowel movement  -patient on bowel regimen  -educated patient to minimize use of narcotics  -diet advanced intolerance  -stable for DC to home    # Anemia, post op related since recent surgery upon post op cbc on 4/25 of 9 2; mild drop likely reflective of hemodilution  No sign of acute blood loss  Will have re-checked in 2 weeks    # Post op spinal surgery at outside facility - f/u with spine ortho as routinely scheduled  The continue wound care as prescribed per Spine Orthopedics  No sign of infection of wound  # Hx of recurrent UTI - on chronic macrobid per her pcp    Disp:  Discharge to home  Already has home care being in place from her prior hospitalization due to her surgery  Plan of care was extensively discussed with patient and her     Condition at Discharge: stable     Discharge Day Visit / Exam:     Subjective:  Feels well has had 3 large bowel movements  Reports of resolved abdominal pain  Resumed on diet well tolerated  Vitals: Blood Pressure: 119/58 (04/27/18 0700)  Pulse: 78 (04/27/18 0700)  Temperature: 98 °F (36 7 °C) (04/27/18 0700)  Temp Source: Oral (04/27/18 0700)  Respirations: 16 (04/27/18 0700)  Height: 5' 7" (170 2 cm) (04/26/18 1815)  Weight - Scale: 72 6 kg (160 lb) (04/26/18 1815)  SpO2: 96 % (04/27/18 0700)  Exam:   Physical Exam   Constitutional: She is oriented to person, place, and time  She appears well-developed and well-nourished  Neck: Neck supple  Cardiovascular: Normal rate, regular rhythm and intact distal pulses  Exam reveals no gallop and no friction rub  Murmur heard  Pulmonary/Chest: Effort normal and breath sounds normal  No respiratory distress  She has no wheezes  She has no rales   She exhibits no tenderness  Abdominal: Soft  Bowel sounds are normal  She exhibits no distension  There is no tenderness  There is no rebound and no guarding  Musculoskeletal: Normal range of motion  She exhibits no edema, tenderness or deformity  Neurological: She is alert and oriented to person, place, and time  She has normal reflexes  She displays normal reflexes  No cranial nerve deficit  She exhibits normal muscle tone  Coordination normal    Skin: Skin is warm and dry  Discharge instructions/Information to patient and family:   See after visit summary for information provided to patient and family  Provisions for Follow-Up Care:  See after visit summary for information related to follow-up care and any pertinent home health orders  Disposition:     Home    For Discharges to [facility] SNF:   · Not Applicable to this Patient - Not Applicable to this Patient    Planned Readmission:  No     Discharge Statement:  I spent less than 30 minutes discharging the patient  This time was spent on the day of discharge  I had direct contact with the patient on the day of discharge  Greater than 50% of the total time was spent examining patient, answering all patient questions, arranging and discussing plan of care with patient as well as directly providing post-discharge instructions  Additional time then spent on discharge activities  Discharge Medications:  See after visit summary for reconciled discharge medications provided to patient and family  ** Please Note: Dragon 360 Dictation voice to text software may have been used in the creation of this document   **

## 2018-04-27 NOTE — PROGRESS NOTES
Progress Note - General Surgery   Rebecca Settler 81 y o  female MRN: [id number]  Unit/Bed#: -01 Encounter: [id number]    Assessment:  Constipation, clinically improving    Plan:  Continue with a bowel regimen, nothing to add from the surgical standpoint  Correct electrolytes  Subjective/Objective   Chief Complaint:  Patient having bowel movement, at least 3 since last night  Subjective:  Denies any abdominal pain, nausea or vomiting    Objective:     Blood pressure 119/58, pulse 78, temperature 98 °F (36 7 °C), temperature source Oral, resp  rate 16, height 5' 7" (1 702 m), weight 72 6 kg (160 lb), SpO2 96 %  ,Body mass index is 25 06 kg/m²  Intake/Output Summary (Last 24 hours) at 04/27/18 1234  Last data filed at 04/27/18 0241   Gross per 24 hour   Intake                0 ml   Output              150 ml   Net             -150 ml       Invasive Devices     Peripheral Intravenous Line            Peripheral IV 04/26/18 Right Antecubital less than 1 day                Physical Exam:  Abdomen is soft, nondistended and nontender      Lab, Imaging and other studies:  CBC:   Lab Results   Component Value Date    WBC 6 33 04/27/2018    HGB 8 2 (L) 04/27/2018    HCT 25 2 (L) 04/27/2018    MCV 93 04/27/2018     04/27/2018    MCH 30 3 04/27/2018    MCHC 32 5 04/27/2018    RDW 13 2 04/27/2018    MPV 9 5 04/27/2018    NRBC 0 04/26/2018   , CMP:   Lab Results   Component Value Date     04/27/2018    K 3 2 (L) 04/27/2018     04/27/2018    CO2 26 04/27/2018    ANIONGAP 7 04/27/2018    BUN 18 04/27/2018    CREATININE 0 99 04/27/2018    GLUCOSE 98 04/27/2018    CALCIUM 9 4 04/27/2018    AST 18 04/26/2018    ALT 30 04/26/2018    ALKPHOS 128 (H) 04/26/2018    PROT 6 5 04/26/2018    BILITOT 0 50 04/26/2018    EGFR 54 04/27/2018     VTE Pharmacologic Prophylaxis: Sequential compression device (Venodyne)   VTE Mechanical Prophylaxis: sequential compression device

## 2018-04-27 NOTE — PLAN OF CARE
Problem: Potential for Falls  Goal: Patient will remain free of falls  INTERVENTIONS:  - Assess patient frequently for physical needs  -  Identify cognitive and physical deficits and behaviors that affect risk of falls  -  [place] fall precautions as indicated by assessment   - Educate patient/family on patient safety including physical limitations  - Instruct patient to call for assistance with activity based on assessment  - Modify environment to reduce risk of injury  - Consider OT/PT consult to assist with strengthening/mobility   Outcome: Progressing      Problem: Prexisting or High Potential for Compromised Skin Integrity  Goal: Skin integrity is maintained or improved  INTERVENTIONS:  - Identify patients at risk for skin breakdown  - Assess and monitor skin integrity  - Assess and monitor nutrition and hydration status  - Monitor labs (i e  albumin)  - Assess for incontinence   - Turn and reposition patient  - Assist with mobility/ambulation  - Relieve pressure over bony prominences  - Avoid friction and shearing  - Provide appropriate hygiene as needed including keeping skin clean and dry  - Evaluate need for skin moisturizer/barrier cream  - Collaborate with interdisciplinary team (i e  Nutrition, Rehabilitation, etc )   - Patient/family teaching   Outcome: Progressing      Problem: Nutrition/Hydration-ADULT  Goal: Nutrient/Hydration intake appropriate for improving, restoring or maintaining nutritional needs  Monitor and assess patient's nutrition/hydration status for malnutrition (ex- brittle hair, bruises, dry skin, pale skin and conjunctiva, muscle wasting, smooth red tongue, and disorientation)  Collaborate with interdisciplinary team and initiate plan and interventions as ordered  Monitor patient's weight and dietary intake as ordered or per policy  Utilize nutrition screening tool and intervene per policy   Determine patient's food preferences and provide high-protein, high-caloric foods as appropriate       INTERVENTIONS:  - Monitor oral intake, urinary output, labs, and treatment plans  - Assess nutrition and hydration status and recommend course of action  - Evaluate amount of meals eaten  - Assist patient with eating if necessary   - Allow adequate time for meals  - Recommend/ encourage appropriate diets, oral nutritional supplements, and vitamin/mineral supplements  - Order, calculate, and assess calorie counts as needed  - Recommend, monitor, and adjust tube feedings and TPN/PPN based on assessed needs  - Assess need for intravenous fluids  - Provide specific nutrition/hydration education as appropriate  - Include patient/family/caregiver in decisions related to nutrition   Outcome: Progressing

## 2018-04-27 NOTE — CASE MANAGEMENT
Initial Clinical Review    Admission: Date/Time/Statement: OBS   4/26 2013        ED: Date/Time/Mode of Arrival:   ED Arrival Information     Expected Arrival Acuity Means of Arrival Escorted By Service Admission Type    - 4/26/2018 11:04 Urgent Walk-In Spouse General Medicine Urgent    Arrival Complaint    WEAKNESS          Chief Complaint:   Chief Complaint   Patient presents with    Abdominal Pain     pt states that she was seen here yesterday and given the option to go home  claims that it has become worse overnight  states that her PCP told her there is an obstruction       History of Illness: Yvan Fernandez is a 81 y o  female medical history significant for hypertension, recent spinal surgery on April 12 she was subsequently discharged home on the 24th  She has been on narcotics for pain  She reports that she has not had a bowel movement in 4-5 days  She presents to ER with complaint of abdominal pain  She presents to the ER a day prior, CT scan revealing fecal impaction  Was subsequently discharged home from the ED with bowel regimen  However patient presents back to the ER given still without bowel movement and still persistent abdominal pain pain      Upon presentation to the ER repeat CT scan revealing fecal impaction and questionable partial bowel obstruction, General surgery has since been consulted review of CT scan themselves ruling out obstruction  She has since been given aggressive bowel regimen  Since admission patient has had 2 small bowel movement  She reports of improved abdominal pain  She denies nausea or vomiting  Otherwise she has no postop complications, her wound was recently checked on the 24th with no issues    She denies fever chills, she denies drainage from the wound site    ED Vital Signs:   ED Triage Vitals [04/26/18 1112]   Temperature Pulse Respirations Blood Pressure SpO2   97 6 °F (36 4 °C) 80 16 158/62 99 %      Temp Source Heart Rate Source Patient Position - Orthostatic VS BP Location FiO2 (%)   Oral Monitor Sitting Left arm --      Pain Score       9        Wt Readings from Last 1 Encounters:   04/26/18 72 6 kg (160 lb)       Vital Signs (abnormal):   04/26/18 1630 -- 101 18  177/77 97 % -- DW   04/26/18 1530 --  113 18  173/77 98 % -- DW   04/26/18 1430 -- 100 18  189/79 98 % -- CLARICE   04/26/18 1330 -- 93 18 130/61            Abnormal Labs/Diagnostic Test Results: H&H   8 8  27 0, gluc  141, alk phos  128, alb  3 0, lipase 72  CT abd-      1  Large quantity of colonic stool is again seen in the colon from the cecum to the proximal sigmoid region with mild diffuse colonic distention   At the sigmoid level there appears to be a mild transition in luminal caliber  Eward Dubs is no focal mass or   inflammatory change clearly evident at this site   Findings could represent constipation, again possibility of a partial colonic obstruction possibly due to stricture or occult pathology cannot be completely excluded   Sigmoidoscopy or single contrast   barium enema may be considered  2  Borderline prominence of the endometrial stripe  Correlate with pelvic ultrasound       3  Post surgical and degenerative changes of the spine as above            ED Treatment:   Medication Administration from 04/26/2018 1104 to 04/26/2018 1811       Date/Time Order Dose Route Action Action by Comments     04/26/2018 1405 sodium chloride 0 9 % bolus 1,000 mL 0 mL Intravenous Stopped Saray Escobar RN      04/26/2018 1246 sodium chloride 0 9 % bolus 1,000 mL 1,000 mL Intravenous New Bag Saray Escobar RN      04/26/2018 1252 iohexol (OMNIPAQUE) 350 MG/ML injection (MULTI-DOSE) 100 mL 100 mL Intravenous Given Renita Calle      04/26/2018 1713 ondansetron (ZOFRAN) injection 4 mg 4 mg Intravenous Given Mendy Richard RN           Past Medical/Surgical History:    Active Ambulatory Problems     Diagnosis Date Noted    Arthritis 03/14/2018    Hypertension, essential 04/02/2018    Combined hyperlipidemia 04/02/2018    Pre-operative cardiovascular examination 04/02/2018    Microscopic hematuria 04/05/2018    Cardiac arrhythmia 04/05/2018    Other impaction of intestine ([facility] [address] ) 04/26/2018     Resolved Ambulatory Problems     Diagnosis Date Noted    Compression fracture of L1 lumbar vertebra ([address] ) 04/05/2018    Occlusion of carotid artery 04/05/2018     Past Medical History:   Diagnosis Date    Alopecia     Cardiac arrhythmia     Compression fracture of L1 lumbar vertebra (HCC)     Occlusion of carotid artery     Sebaceous cyst        Admitting Diagnosis: Other impaction of intestine ([facility] [address] ) [K56 49]  Abdominal pain [R10 9]  Constipation [K59 00]    Age/Sex: 81 y o  female    Assessment/Plan: Assessment/Plan:     Hospital Problem List:      Principal Problem:    Abdominal pain  Active Problems:    Fecal impaction ([address] )    Anemia        Plan for the Primary Problem(s):     # Abd pain - secondary to fecal impaction d/t severe constipation for opioid use given recent surgery  - Gen surg on board, placed on bowel regimen with some relief thus far she has had 2 BM's  No sign of obstruction  - Keep NPO for now  - Cont IVF hydration  - Hold narcotics  - Encouraged ambulation     # Anemia - post op related given recent surgery  - no sign of acute blood loss  - Remains stable     Plan for Additional Problems:      # HTN - cont atenolol     # Hx of recurrent UTI - on macrobid daily     Disp: Plan of care discussed with patient at length  Code status addressed she is DNR/DNI  VTE Prophylaxis: Recent spinal surgery hold heparin, patient ambulating  / sequential compression device   Code Status:  DNR DNI  POLST: There is no POLST form on file for this patient (pre-hospital)     Anticipated Length of Stay:  Patient will be admitted on an Observation basis with an anticipated length of stay of  less than 2 midnights     Justification for Hospital Stay:  Fecal impaction       Admission Orders:  Scheduled Meds: Current Facility-Administered Medications:  acetaminophen 650 mg Oral Q6H PRN Lawence Cheeks, DO    aspirin 81 mg Oral Daily Lawence Cheeks, DO    atenolol 25 mg Oral Daily Lawence Cheeks, DO    bisacodyl 10 mg Rectal Daily Farzana Bedoya MD    calcium carbonate-vitamin D 1 tablet Oral Daily Lawence Cheeks, DO    diazepam 5 mg Oral Q6H PRN Lawence Cheeks, DO    docusate sodium 100 mg Oral BID Farzana Bedoya MD    HYDROmorphone 1 mg Intravenous Once Mirna Larios PA-C    multivitamin-minerals 1 tablet Oral Daily Lawence Cheeks, DO    polyethylene glycol 17 g Oral Daily Farzana Bedoya MD    sodium chloride 75 mL/hr Intravenous Continuous Lawence Cheeks, DO Last Rate: 75 mL/hr (04/26/18 2110)   sodium phosphate-biphosphate 1 enema Rectal Daily PRN Farzana Bedoya MD      Continuous Infusions:   sodium chloride 75 mL/hr Last Rate: 75 mL/hr (04/26/18 2110)     PRN Meds:   acetaminophen    diazepam    sodium phosphate-biphosphate    Up w assist   NPO   Surgical consult   SCD  Cbc BMP 4/27    H&H   8 2  25 2    Surgical consult  4/26  Patient Active Problem List   Diagnosis    Arthritis    Hypertension, essential    Combined hyperlipidemia    Pre-operative cardiovascular examination    Microscopic hematuria    Cardiac arrhythmia    Other impaction of intestine (HCC)   Constiptation  Ct shows stool throughout the colon, no obvious sign of mass or external pressure on colon  I do not suspect true obstruction, most likely contipation  Will keep NPO, start aggressive bowel regime  May consider barium enema if no significant improvement to both r/o obstruction and possibly help stimulate function   No plan for any acute surgical intervention

## 2018-04-27 NOTE — DISCHARGE INSTRUCTIONS
Constipation   WHAT YOU NEED TO KNOW:   Constipation is when you have hard, dry bowel movements, or you go longer than usual between bowel movements  DISCHARGE INSTRUCTIONS:   Seek care immediately if:   · You have blood in your bowel movements  · You have a fever and abdominal pain with the constipation  Contact your healthcare provider if:   · Your constipation gets worse  · You start to vomit  · You have questions or concerns about your condition or care  Medicines:   · Medicine or a fiber supplement  may help make your bowel movement softer  A laxative may help relax and loosen your intestines to help you have a bowel movement  You may also be given medicine to increase fluid in your intestines  The fluid may help move bowel movements through your intestines  · Take your medicine as directed  Contact your healthcare provider if you think your medicine is not helping or if you have side effects  Tell him of her if you are allergic to any medicine  Keep a list of the medicines, vitamins, and herbs you take  Include the amounts, and when and why you take them  Bring the list or the pill bottles to follow-up visits  Carry your medicine list with you in case of an emergency  Manage your constipation:   · Drink liquids as directed  You may need to drink extra liquids to help soften and move your bowels  Ask how much liquid to drink each day and which liquids are best for you  · Eat high-fiber foods  This may help decrease constipation by adding bulk to your bowel movements  High-fiber foods include fruit, vegetables, whole-grain breads and cereals, and beans  Your healthcare provider or dietitian can help you create a high-fiber meal plan  · Exercise regularly  Regular physical activity can help stimulate your intestines  Ask which exercises are best for you  · Schedule a time each day to have a bowel movement    This may help train your body to have regular bowel movements  Bend forward while you are on the toilet to help move the bowel movement out  Sit on the toilet for at least 10 minutes, even if you do not have a bowel movement  Follow up with your healthcare provider as directed:  Write down your questions so you remember to ask them during your visits  © 2017 [address] [employer] Information is for End User's use only and may not be sold, redistributed or otherwise used for commercial purposes  All illustrations and images included in CareNotes® are the copyrighted property of [employer]  or [employer]fon[employer] Mendo[employer]  The above information is an  only  It is not intended as medical advice for individual conditions or treatments  Talk to your doctor, nurse or pharmacist before following any medical regimen to see if it is safe and effective for you  Fecal Impaction   WHAT YOU NEED TO KNOW:   What is fecal impaction? Fecal impaction is a buildup of hardened bowel movements in your rectum that you cannot pass  Your bowel movements may form one large mass or several smaller masses  What increases my risk for fecal impaction? · Constipation    · Lack of fiber or liquids in your diet    · Ignoring the urge to pass a bowel movement    · Being older than 80     · Certain medical conditions, such as multiple sclerosis (MS) or Parkinson disease    · Lack of physical activity    · Certain pain medicines, iron supplements, or antidepressants  What are the signs and symptoms of fecal impaction? · Cannot pass a bowel movement or feeling like you did not pass the entire bowel movement    · Abdominal pain, bloating, or cramps    · Some diarrhea or gas that you cannot control, increased urge to urinate, or loss of control of urine    · Nausea or vomiting    · Loss of appetite or weight loss    · Confusion or agitation    · Headache or a general ill feeling  How is fecal impaction diagnosed?   Your healthcare provider will ask which medicines you take  You may need any of the following:  · A digital rectal exam  will be done by your healthcare provider to check for a fecal impaction  Your healthcare provider will put a gloved finger inside your anus to feel for the bowel movement  · Blood tests  will be done to check for anemia, infection, and high levels of iron in your blood  · An x-ray  may show a blockage in your bowel or rectum  · A barium enema  is an x-ray of the colon  A tube is put into your anus, and a liquid called barium is put through the tube  Barium helps your colon show up better on the x-ray  · A sigmoidoscopy  is a scope used to check for any changes in your bowels that may be causing your condition  A long, thin tube with a tiny camera on the end is put into your rectum  A small tissue sample may be taken from your bowel and sent to a lab for tests  Follow your healthcare provider's instructions for what to do before, during, and after the test   How is fecal impaction treated? · Manual removal  of your bowel movement blockage may need to be done  Your healthcare provider will use his hand to remove the bowel movement  He may have to break it into smaller pieces with his fingers  He will use lubricant to help make the removal smoother  · Enemas  help relieve constipation and soften your bowel movement so you are able to pass it  You may receive an enema during the manual removal or by itself  Your healthcare provider will inject liquid into your rectum  · Suppositories  are small cone-shaped pills that you insert into your rectum to help loosen your bowel movements  · Laxatives  are drinks or drink mixtures that help loosen your bowel movement blockage so you can pass it with ease  · Mineral oils  are lubricants for your bowels that help soften your bowel movements so they pass smoothly  These may be used if your healthcare provider could not completely remove the blockage    What are the risks of fecal impaction? You could lose the urge to pass bowel movements  Constipation and other symptoms could return after treatment, or if you do not follow your healthcare provider's plan  Long-term blockages of your bowel movements could lead to sepsis (a serious blood infection)  It could also lead to low blood pressure, bleeding, or torn bowels  These conditions could be life-threatening  How can fecal impaction be prevented? · Set up a regular toileting schedule at the same time every day  Try to pass a bowel movement when you first wake up, or half an hour after you eat  This may help you control your bowel movements and help you know when they will happen  · Take fiber supplements daily as directed  Fiber supplements will help you have a regular bowel movement schedule  · Use enemas, laxatives, or constipation medicines as directed  Ask your healthcare provider how often to take medicine when you begin to feel constipated  How can I manage my symptoms? · Eat foods that are high in fiber  Healthy high-fiber foods include fruits, vegetables, whole-grain breads, and beans  Ask if you need to be on a special diet and how much fiber you should eat each day  · Drink liquids as directed  You may need to drink 1 5 to 2 liters of water each day  Water will help keep your bowel movements soft and help you pass them with less pain  Ask your healthcare provider how much liquid to drink each day and which liquids are best for you  · Try to pass a bowel movement as soon as you get the urge  This will help prevent more constipation  · Exercise regularly  This will help keep your bowels working well and may help you pass bowel movements more often  · Keep a diary of your bowel movement schedule  Write down the date and time of each bowel movement  Also write down if you had trouble passing it  This will help you know if you are passing bowel movements as you should    When should I contact my healthcare provider? Contact your healthcare provider if:  · You are not able to pass a bowel movement, even after treatment, or you pass fewer than 3 bowel movements in a week  · You feel lightheaded, dizzy, or faint  · You have nausea, vomiting, or diarrhea that will not stop  · You have a fever  · You have questions or concerns about your condition or care  When should I seek immediate care? Seek immediate care or call 911 if:  · You have swelling in your abdomen  · You have bloody bowel movements  · You bleed from your rectum  · You have severe pain  CARE AGREEMENT:   You have the right to help plan your care  Learn about your health condition and how it may be treated  Discuss treatment options with your caregivers to decide what care you want to receive  You always have the right to refuse treatment  The above information is an  only  It is not intended as medical advice for individual conditions or treatments  Talk to your doctor, nurse or pharmacist before following any medical regimen to see if it is safe and effective for you  © 2017 [facility] Information is for End User's use only and may not be sold, redistributed or otherwise used for commercial purposes  All illustrations and images included in CareNotes® are the copyrighted property of [employer]  or [employer]  Acetaminophen (By mouth)   Acetaminophen (z-ofwc-j-MIN-oh-fen)  Treats minor aches and pain and reduces fever  Brand Name(s): 8 Hour Pain Relief, Acetaminophen Children's, Acetaminophen Infant's, Arthritis Pain Formula, Arthritis Pain Relief, Cetafen, Cetafen Extra, Children's Acetaminophen, Children's Dye Free Pain and Fever, Children's Mapap, Children's Pain & Fever, Children's Pain Relief, Children's Pain Reliever, Children's Q-PAP, Children's Tylenol   There may be other brand names for this medicine    When This Medicine Should Not Be Used:   This medicine is not right for everyone  Do not use it if you had an allergic reaction to acetaminophen  How to Use This Medicine:   Capsule, Liquid Filled Capsule, Liquid, Powder, Tablet, Chewable Tablet, Dissolving Tablet, Fizzy Tablet, Long Acting Tablet  · Your doctor will tell you how much medicine to use  Do not use more than directed  · If you are taking this medicine without the advice of your doctor, carefully read and follow the Drug Facts label and dosing instructions on the medicine package  Ask your doctor or pharmacist if you are not sure how to use this medicine  · Do not take this medicine for more than 10 days in a row, unless directed by your doctor  · The chewable tablet should be chewed or crushed before you swallow it  · Oral liquids: Measure the oral liquid medicine with a marked measuring spoon, oral syringe, or medicine cup  Do not use a spoon, syringe, or cup that came with a different medicine  · Oral liquid (with syringe): ¨ Shake the bottle well before each use  ¨ Remove the cap  Attach the syringe to the flow restrictor  Turn the bottle upside down  ¨ Pull back the syringe plunger until it is filled with the correct dose  Ask your doctor or pharmacist if you are not sure how much medicine to use  ¨ Slowly give the medicine into your child's mouth  Point the syringe so the medicine goes toward the inner cheek  · Oral liquid (with dropper): ¨ Shake the bottle well before each use  ¨ Remove the cap  Insert the dropper into the bottle  Withdraw the correct dose  Ask your doctor or pharmacist if you are not sure how much medicine to use  ¨ Slowly give the medicine into your child's mouth  Point the dropper so the medicine goes toward the inner cheek  · Extended-release tablet: Swallow the extended-release tablet whole  Do not crush, break, or chew it  Take this medicine with a full glass of water  · Use only the brand of medicine your doctor prescribed   Other brands may not work the same way  · Missed dose: Take a dose as soon as you remember  If it is almost time for your next dose, wait until then and take a regular dose  Do not take extra medicine to make up for a missed dose  · Store the medicine in a closed container at room temperature, away from heat, moisture, and direct light  Drugs and Foods to Avoid:   Ask your doctor or pharmacist before using any other medicine, including over-the-counter medicines, vitamins, and herbal products  · Some medicines and foods can affect how acetaminophen works  Tell your doctor if you are taking a blood thinner, such as warfarin  · Do not drink alcohol while you are using this medicine  Acetaminophen can damage your liver, and alcohol can increase this risk  Do not take acetaminophen without asking your doctor if you have 3 or more drinks of alcohol every day  Warnings While Using This Medicine:   · Tell your doctor if you are pregnant or breastfeeding, or if you have kidney or liver disease  Tell your doctor if you have phenylketonuria (PKU)  Some brands of acetaminophen contain aspartame, which can make PKU worse  · This medicine contains acetaminophen  Read the labels of all other medicines you are using to see if they also contain acetaminophen, or ask your doctor or pharmacist  Errol Cooper not use more than 4 grams (4,000 milligrams) total of acetaminophen in one day  For Tylenol® Extra Strength, it is not safe to take more than 3 grams (3,000 milligrams) in 1 day  · Call your doctor if your symptoms do not improve or if they get worse  · Tell any doctor or dentist who treats you that you are using this medicine  This medicine may affect certain medical test results  · Keep all medicine out of the reach of children  Never share your medicine with anyone    Possible Side Effects While Using This Medicine:   Call your doctor right away if you notice any of these side effects:  · Allergic reaction: Itching or hives, swelling in your face or hands, swelling or tingling in your mouth or throat, chest tightness, trouble breathing  · Bloody or black, tarry stools  · Dark urine or pale stools, nausea, vomiting, loss of appetite, severe stomach pain, yellow skin or eyes  · Fever or a sore throat that lasts longer than 3 days, or pain that lasts longer than 5 days  · Lightheadedness, fainting, sweating, or weakness  · Unusual bleeding or bruising  · Vomiting blood or material that looks like coffee grounds  If you notice other side effects that you think are caused by this medicine, tell your doctor  Call your doctor for medical advice about side effects  You may report side effects to FDA at 6-932-FDA-7065  © 2017 [address] [employer]  Information is for End User's use only and may not be sold, redistributed or otherwise used for commercial purposes  The above information is an  only  It is not intended as medical advice for individual conditions or treatments  Talk to your doctor, nurse or pharmacist before following any medical regimen to see if it is safe and effective for you  Bisacodyl (By mouth)   Bisacodyl (bis-AK-oh-dil)  Treats constipation  Brand Name(s): Alophen, Correctol, Dulcolax, Dulcolax Bowel Cleansing Kit, Dulcolax Bowel Prep Kit, Fleet Bisacodyl, Gentle Laxative, [facility] Laxative, Good Sense Bisacodyl Laxative, Good Sense Women's Laxative, Facundo Prep, Medi-Lax, , TopCare Laxative, TopCare Woman's Laxative   There may be other brand names for this medicine  When This Medicine Should Not Be Used: You should not use this medicine if you have had an allergic reaction to bisacodyl  How to Use This Medicine:   Tablet, Coated Tablet  · Your doctor will tell you how much medicine to use  Do not use more than directed  · Swallow the tablet whole  Do not chew or crush it    How to Store and Dispose of This Medicine:   · Store the medicine in a closed container at room temperature, away from heat, moisture, and direct light  · Ask your pharmacist, doctor, or health caregiver about the best way to dispose of any outdated medicine or medicine no longer needed  · Keep all medicine out of the reach of children  Never share your medicine with anyone  Drugs and Foods to Avoid:   Ask your doctor or pharmacist before using any other medicine, including over-the-counter medicines, vitamins, and herbal products  · Do not use this medicine within 1 hour after drinking milk or taking an antacid  Warnings While Using This Medicine:   · Make sure your doctor knows if you are pregnant or breast feeding  · Before using this medicine, make sure your doctor knows if you have stomach pain, nausea, vomiting, or a sudden change in bowel habits  · Make sure your doctor knows if you cannot swallow the tablet without chewing  · Tell your doctor if your constipation does not improve after using this medicine for 1 week  · You should not give this medicine to a child under 10years of age unless a doctor tells you to  · You may not see results for several hours after you have taken this medicine  · Stop using this medicine and call your doctor right away if you do not have a bowel movement in 12 hours, or if you have rectal bleeding  Possible Side Effects While Using This Medicine:   Call your doctor right away if you notice any of these side effects:  · Allergic reaction: Itching or hives, swelling in your face or hands, swelling or tingling in your mouth or throat, chest tightness, trouble breathing  · Lightheadedness or fainting  If you notice these less serious side effects, talk with your doctor:   · Mild stomach cramps or discomfort  If you notice other side effects that you think are caused by this medicine, tell your doctor  Call your doctor for medical advice about side effects   You may report side effects to FDA at 0-825-FDA-7515  © 2017 [address] [employer] Information is for End User's use only and may not be sold, redistributed or otherwise used for commercial purposes  The above information is an  only  It is not intended as medical advice for individual conditions or treatments  Talk to your doctor, nurse or pharmacist before following any medical regimen to see if it is safe and effective for you  Laxative, Stool Softeners (By mouth)   Treats constipation by helping you have a bowel movement  Brand Name(s): Col-Rite, Colace, Colace Clear, DSS, Diocto, Diocto Liquid, Doc-Q-Lace, Docuprene, Docusil, Dok, Dulcolax, Fleet Sof-Lax, [facility] Docusate Calcium, [facility] Stool Softener, [facility] Stool Softner   There may be other brand names for this medicine  When This Medicine Should Not Be Used: You should not use this medicine if you have severe stomach pain, nausea, or vomiting  Stool softeners should not be used if you have severe stomach pain and do not know the cause  How to Use This Medicine:   Capsule, Tablet, Liquid, Liquid Filled Capsule  · Your doctor will tell you how much medicine to use  Do not use more than directed  · Follow the instructions on the medicine label if you are using this medicine without a prescription  · Drink 6 to 8 glasses of water daily while using any laxative  · To make the oral liquid taste better, you may mix it with one-half glass of milk or fruit juice  · Measure the oral liquid medicine with a marked measuring spoon, oral syringe, medicine cup, or medicine dropper  If a dose is missed:   · Use the missed dose as soon as possible  · If you do not remember the missed dose until the next day, skip the missed dose and go back to your regular dosing schedule  · You should not use two doses at the same time  How to Store and Dispose of This Medicine:   · Store the medicine in a tightly closed container at room temperature, away from heat and moisture   Do not store liquid-filled capsules in the refrigerator  · Keep all medicine out of the reach of children  Drugs and Foods to Avoid:   Ask your doctor or pharmacist before using any other medicine, including over-the-counter medicines, vitamins, and herbal products  · You should not use mineral oil while you are using a stool softener  · You should not use a stool softener within 2 hours before or after taking any other medicines  Laxatives can keep other medicines from working correctly  Warnings While Using This Medicine:   · If you are pregnant or breastfeeding, talk to your doctor before taking this medicine  · Do not give laxatives to children under 10years old unless you talk to your doctor  · You should not use this laxative for longer than 1 week unless approved by your doctor  Laxatives may be habit-forming and can harm your bowels if you use them too long  · Stool softeners usually work in 1 to 2 days, but for some people, results can take as long as 3 to 5 days  Possible Side Effects While Using This Medicine: If you notice these less serious side effects, talk with your doctor:  · Nausea  · Sore throat  · Skin rash  If you notice other side effects that you think are caused by this medicine, tell your doctor  Call your doctor for medical advice about side effects  You may report side effects to FDA at 6-955-FDA-5250  © 2017 [address] [employer] Information is for End User's use only and may not be sold, redistributed or otherwise used for commercial purposes  The above information is an  only  It is not intended as medical advice for individual conditions or treatments  Talk to your doctor, nurse or pharmacist before following any medical regimen to see if it is safe and effective for you  Oxycodone/Acetaminophen (By mouth)   Acetaminophen (a-peph-j-MIN-oh-fen), Oxycodone Hydrochloride (os-y-MJW-done melchor-droe-KLOR-kimmy)  Treats moderate to moderately severe pain  This medicine is a narcotic pain reliever  Brand Name(s): Endocet, Percocet, Primlev, Xartemis XR   There may be other brand names for this medicine  When This Medicine Should Not Be Used: This medicine is not right for everyone  Do not use it if you had an allergic reaction to acetaminophen or oxycodone, or if you have serious breathing problems or paralytic ileus  How to Use This Medicine:   Capsule, Liquid, Tablet, Long Acting Tablet  · Your doctor will tell you how much medicine to use  Do not use more than directed  · An overdose can be dangerous  Follow directions carefully so you do not get too much medicine at one time  · Oral liquid: Measure the oral liquid medicine with a marked measuring spoon, oral syringe, or medicine cup  · Swallow the extended-release tablet whole  Do not crush, break, or chew it  Do not lick or wet the tablet before placing it in your mouth  Do not give this medicine through a feeding tube  · This medicine should come with a Medication Guide  Ask your pharmacist for a copy if you do not have one  · Missed dose: If you miss a dose of this medicine, skip the missed dose and go back to your regular dosing schedule  Do not double doses  · Store the medicine in a closed container at room temperature, away from heat, moisture, and direct light  Ask your pharmacist about the best way to dispose of medicine you do not use  Drugs and Foods to Avoid:   Ask your doctor or pharmacist before using any other medicine, including over-the-counter medicines, vitamins, and herbal products  · Do not use Xartemis XR if you are using or have used an MAO inhibitor in the past 14 days  · Some medicines can affect how this medicine works   Tell your doctor if you are using any of the following:   ¨ Carbamazepine, erythromycin, ketoconazole, lamotrigine, mirtazapine, naltrexone, phenytoin, propranolol, rifampin, ritonavir, tramadol, trazodone, or zidovudine  ¨ Birth control pills  ¨ Diuretic (water pill)  ¨ Medicine to treat depression  ¨ Phenothiazine medicine  ¨ Triptan medicine to treat migraine headaches  · Do not drink alcohol while you are using this medicine  Acetaminophen can damage your liver, and alcohol can increase this risk  Do not take acetaminophen without asking your doctor if you have 3 or more drinks of alcohol every day  · Tell your doctor if you use anything else that makes you sleepy  Some examples are allergy medicine, narcotic pain medicine, and alcohol  Tell your doctor if you are using buprenorphine, butorphanol, nalbuphine, pentazocine, a benzodiazepine, or a muscle relaxer  Warnings While Using This Medicine:   · Tell your doctor if you are pregnant or breastfeeding, or if you have kidney disease, liver disease, heart disease, low blood pressure, breathing problems or lung disease (such as asthma, COPD), thyroid problems, [place] disease, pancreas or gallbladder problems, prostate problems, trouble urinating, or a stomach problems, or a history of head injury or brain damage, seizures, or alcohol or drug abuse  Tell your doctor if you are allergic to codeine  · This medicine may cause the following problems:  ¨ High risk of overdose, which can lead to death  ¨ Respiratory depression (serious breathing problem that can be life-threatening)  ¨ Liver problems  ¨ Serious skin reactions  ¨ Serotonin syndrome (when used with certain medicines)  · This medicine may make you dizzy or drowsy  Do not drive or do anything that could be dangerous until you know how this medicine affects you  Sit or lie down if you feel dizzy  Stand up carefully  · This medicine contains acetaminophen  Read the labels of all other medicines you are using to see if they also contain acetaminophen, or ask your doctor or pharmacist  John Rebolledo not use more than 4 grams (4,000 milligrams) total of acetaminophen in one day  · This medicine can be habit-forming  Do not use more than your prescribed dose   Call your doctor if you think your medicine is not working  · Do not stop using this medicine suddenly  Your doctor will need to slowly decrease your dose before you stop it completely  · This medicine could cause infertility  Talk with your doctor before using this medicine if you plan to have children  · This medicine may cause constipation, especially with long-term use  Ask your doctor if you should use a laxative to prevent and treat constipation  · Keep all medicine out of the reach of children  Never share your medicine with anyone  Possible Side Effects While Using This Medicine:   Call your doctor right away if you notice any of these side effects:  · Allergic reaction: Itching or hives, swelling in your face or hands, swelling or tingling in your mouth or throat, chest tightness, trouble breathing  · Anxiety, restlessness, fast heartbeat, fever, muscle spasms, twitching, diarrhea, seeing or hearing things that are not there  · Blistering, peeling, red skin rash  · Blue lips, fingernails, or skin  · Dark urine or pale stools, loss of appetite, stomach pain, yellow skin or eyes  · Extreme weakness, shallow breathing, uneven heartbeat, seizures, sweating, or cold or clammy skin  · Severe confusion, lightheadedness, dizziness, or fainting  · Severe constipation, nausea, or vomiting  · Trouble breathing or slow breathing  If you notice these less serious side effects, talk with your doctor:   · Headache  · Mild constipation, nausea, or vomiting  · Mild sleepiness or drowsiness  If you notice other side effects that you think are caused by this medicine, tell your doctor  Call your doctor for medical advice about side effects  You may report side effects to FDA at 2-901-SDZ-7131  © 2017 [facility] Information is for End User's use only and may not be sold, redistributed or otherwise used for commercial purposes  The above information is an  only   It is not intended as medical advice for individual conditions or treatments  Talk to your doctor, nurse or pharmacist before following any medical regimen to see if it is safe and effective for you  Polyethylene Glycol 3350 (By mouth)   Polyethylene Glycol (lap-av-BAR-cara-ben Schmitt)  Treats occasional constipation  Brand Name(s): Equate ClearLax, Annemarie Hutson, [facility] Clear Lax, Healthylax Polyethylene Glycol 3350, [employer] ClearLax, MiraLAX, , Purelax, [employer] Laxative, [employer] Clearmax, [employer] ClearLax, [facility] Polyethylene Glycol 3350   There may be other brand names for this medicine  When This Medicine Should Not Be Used: You should not use this medicine if you have had an allergic reaction to polyethylene glycol, or if you have signs of a bowel obstruction (nausea, vomiting, stomach pain or bloating)  How to Use This Medicine:   Powder, Liquid, Packet  · Your doctor will tell you how much of this medicine to take and how often  Do not take more medicine or take it more often than your doctor tells you to  This medicine is not for long-term use  · Always dissolve the powder in a full glass (8 ounces) of water, juice, soda, coffee, or tea before swallowing it  If you need to measure your medicine, use a marked measuring cup or spoon  · Measure the oral liquid medicine with a marked measuring spoon, oral syringe, or medicine cup  · It may take 2 days or longer for this medicine to help you have a bowel movement  If a dose is missed:   · If you miss a dose or forget to take your medicine, take it as soon as you can  If it is almost time for your next dose, wait until then to take the medicine and skip the missed dose  · Do not use extra medicine to make up for a missed dose  How to Store and Dispose of This Medicine:   · Store the medicine at room temperature in a closed container, away from heat, moisture, and direct light  · Keep all medicine out of the reach of children and never share your medicine with anyone    Drugs and Foods to Avoid:      Ask your doctor or pharmacist before using any other medicine, including over-the-counter medicines, vitamins, and herbal products  Warnings While Using This Medicine:   · Make sure your doctor knows if you are pregnant or breastfeeding  · Do not use this medicine longer than 2 weeks unless your doctor has told you to  Possible Side Effects While Using This Medicine:   Call your doctor right away if you notice any of these side effects:  · Allergic reaction: Itching or hives, swelling in face or hands, swelling or tingling in the mouth or throat, tightness in chest, trouble breathing  · Severe stomach pain, bloating, vomiting, or diarrhea  If you notice these less serious side effects, talk with your doctor:   · Mild cramps, bloating, diarrhea, or gas  If you notice other side effects that you think are caused by this medicine, tell your doctor  Call your doctor for medical advice about side effects  You may report side effects to FDA at 1-851-FDA-4643  © 2017 [address] [employer]  Information is for End User's use only and may not be sold, redistributed or otherwise used for commercial purposes  The above information is an  only  It is not intended as medical advice for individual conditions or treatments  Talk to your doctor, nurse or pharmacist before following any medical regimen to see if it is safe and effective for you  Senna (By mouth)   Senna  Relieves occasional constipation  Brand Name(s):   There may be other brand names for this medicine  When This Medicine Should Not Be Used: This medicine is generally considered safe for most people  Talk to your doctor if you have concerns  How to Use This Medicine:   Liquid, Powder, Tablet, Chewable Tablet  · Your doctor will tell you how much medicine to use  Do not use more than directed  This medicine causes bowel movement in 6 to 12 hours    · Oral liquid: Measure the oral liquid medicine with a marked measuring spoon, oral syringe, or medicine cup  · Follow the instructions on the medicine label if you are using this medicine without a prescription  · Store the medicine in a closed container at room temperature, away from heat, moisture, and direct light  Drugs and Foods to Avoid:      Ask your doctor or pharmacist before using any other medicine, including over-the-counter medicines, vitamins, and herbal products  Warnings While Using This Medicine:   · Tell your doctor if you are pregnant or breastfeeding, or if you have kidney disease or liver disease  · Before you use this medicine, tell your doctor if you have stomach pain, nausea, vomiting  Tell him if you have already used a laxative for more than 1 week, or if you have had changes in your bowel movements recently  · Keep all medicine out of the reach of children  Never share your medicine with anyone  Possible Side Effects While Using This Medicine:   Call your doctor right away if you notice any of these side effects:  · Allergic reaction: Itching or hives, swelling in your face or hands, swelling or tingling in your mouth or throat, chest tightness, trouble breathing  · Black, tarry stools  · Stomach pain, nausea or vomiting  If you notice other side effects that you think are caused by this medicine, tell your doctor  Call your doctor for medical advice about side effects  You may report side effects to FDA at 6-032-FDA-4030  © 2017 [address] [employer] Information is for End User's use only and may not be sold, redistributed or otherwise used for commercial purposes  The above information is an  only  It is not intended as medical advice for individual conditions or treatments  Talk to your doctor, nurse or pharmacist before following any medical regimen to see if it is safe and effective for you  High Fiber Diet   WHAT YOU NEED TO KNOW:   What is a high-fiber diet?   A high-fiber diet includes foods that have a high amount of fiber  Fiber is the part of fruits, vegetables, and grains that is not broken down by your body  Fiber keeps your bowel movements regular  Fiber can also help lower your cholesterol level, control blood sugar in people with diabetes, and relieve constipation  Fiber can also help you control your weight because it helps you feel full faster  Most adults should eat 25 to 35 grams of fiber each day  Talk to your dietitian or healthcare provider about the amount of fiber you need  What foods are good sources of fiber? · Foods with at least 4 grams of fiber per serving:      ¨ ? to ½ cup of high-fiber cereal (check the nutrition label on the box)    ¨ ½ cup of blackberries or raspberries    ¨ 4 dried prunes    ¨ 1 cooked artichoke    ¨ ½ cup of cooked legumes, such as lentils, or red, kidney, and correa beans    · Foods with 1 to 3 grams of fiber per serving:      ¨ 1 slice of whole-wheat, pumpernickel, or rye bread    ¨ ½ cup of cooked brown rice    ¨ 4 whole-wheat crackers    ¨ 1 cup of oatmeal    ¨ ½ cup of cereal with 1 to 3 grams of fiber per serving (check the nutrition label on the box)    ¨ 1 small piece of fruit, such as an apple, banana, pear, kiwi, or orange    ¨ 3 dates    ¨ ½ cup of canned apricots, fruit cocktail, peaches, or pears    ¨ ½ cup of raw or cooked vegetables, such as carrots, cauliflower, cabbage, spinach, squash, or corn  What are some ways that I can increase fiber in my diet? · Choose brown or wild rice instead of white rice  · Use whole wheat flour in recipes instead of white or all-purpose flour  · Add beans and peas to casseroles or soups  · Choose fresh fruit and vegetables with peels or skins on instead of juices  What other guidelines should I follow? · Add fiber to your diet slowly  You may have abdominal discomfort, bloating, and gas if you add fiber to your diet too quickly  · Drink plenty of liquids as you add fiber to your diet    You may have nausea or develop constipation if you do not drink enough water  Ask how much liquid to drink each day and which liquids are best for you  CARE AGREEMENT:   You have the right to help plan your care  Discuss treatment options with your caregivers to decide what care you want to receive  You always have the right to refuse treatment  The above information is an  only  It is not intended as medical advice for individual conditions or treatments  Talk to your doctor, nurse or pharmacist before following any medical regimen to see if it is safe and effective for you  © 2017 [facility] Information is for End User's use only and may not be sold, redistributed or otherwise used for commercial purposes  All illustrations and images included in CareNotes® are the copyrighted property of A D [employer]  or Alfonzo Mendoza

## 2018-04-27 NOTE — H&P
History and Physical - [facility] Internal Medicine    Patient Information: Sherine Stringer 81 y o  female MRN: [id number]  Unit/Bed#: -01 Encounter: [id number]  Admitting Physician: Vivian Ellsworth DO  PCP: Uri Cheng MD  Date of Admission:  04/26/18    Assessment/Plan:    Hospital Problem List:     Principal Problem:    Abdominal pain  Active Problems:    Fecal impaction ([address] )    Anemia      Plan for the Primary Problem(s):    # Abd pain - secondary to fecal impaction d/t severe constipation for opioid use given recent surgery  - Gen surg on board, placed on bowel regimen with some relief thus far she has had 2 BM's  No sign of obstruction  - Keep NPO for now  - Cont IVF hydration  - Hold narcotics  - Encouraged ambulation    # Anemia - post op related given recent surgery  - no sign of acute blood loss  - Remains stable    Plan for Additional Problems:     # HTN - cont atenolol    # Hx of recurrent UTI - on macrobid daily    Disp: Plan of care discussed with patient at length  Code status addressed she is DNR/DNI  VTE Prophylaxis: Recent spinal surgery hold heparin, patient ambulating  / sequential compression device   Code Status:  DNR DNI  POLST: There is no POLST form on file for this patient (pre-hospital)    Pt meets criteria for observation, inpatient was initially entered in error  Anticipated Length of Stay:  Patient will be admitted on an Observation basis with an anticipated length of stay of  less than 2 midnights  Justification for Hospital Stay:  Fecal impaction    Total Time for Visit, including Counseling / Coordination of Care: 45 minutes  Greater than 50% of this total time spent on direct patient counseling and coordination of care  Chief Complaint:   Abdominal pain    History of Present Illness: Sherine Stringer is a 81 y o  female medical history significant for hypertension, recent spinal surgery on April 12 she was subsequently discharged home on the 24th    She has been on narcotics for pain  She reports that she has not had a bowel movement in 4-5 days  She presents to ER with complaint of abdominal pain  She presents to the ER a day prior, CT scan revealing fecal impaction  Was subsequently discharged home from the ED with bowel regimen  However patient presents back to the ER given still without bowel movement and still persistent abdominal pain pain  Upon presentation to the ER repeat CT scan revealing fecal impaction and questionable partial bowel obstruction, General surgery has since been consulted review of CT scan themselves ruling out obstruction  She has since been given aggressive bowel regimen  Since admission patient has had 2 small bowel movement  She reports of improved abdominal pain  She denies nausea or vomiting  Otherwise she has no postop complications, her wound was recently checked on the 24th with no issues  She denies fever chills, she denies drainage from the wound site    Review of Systems:    Review of Systems   Constitutional: Negative  HENT: Negative  Eyes: Negative  Respiratory: Negative  Cardiovascular: Negative  Gastrointestinal: Positive for abdominal pain  Endocrine: Negative  Genitourinary: Negative  Musculoskeletal: Negative  Skin: Negative  Allergic/Immunologic: Negative  Neurological: Negative  Hematological: Negative  Psychiatric/Behavioral: Negative          Past Medical and Surgical History:     Past Medical History:   Diagnosis Date    Alopecia     Cardiac arrhythmia     Compression fracture of L1 lumbar vertebra ([facility] [address] )     resolved 09/2014    Occlusion of carotid artery     unspecified laterality resolved 08/05/2016    Sebaceous cyst        Past Surgical History:   Procedure Laterality Date    APPENDECTOMY      COLONOSCOPY      resolved 2009    ESOPHAGOGASTRODUODENOSCOPY      mild gastritis resolved 2009    THROMBOENDARTERECTOMY Left     carotid, resloved 12/2012    TONSILLECTOMY Meds/Allergies:    Prior to Admission medications    Medication Sig Start Date End Date Taking? Authorizing Provider   aspirin 81 mg chewable tablet Chew 1 tablet daily   Yes Historical Provider, MD   atenolol (TENORMIN) 25 mg tablet Take 25 mg by mouth daily   1/18/18  Yes Historical Provider, MD   Calcium Carbonate-Vitamin D3 600-400 MG-UNIT TABS 1 tab daily   Yes Historical Provider, MD   diazepam (VALIUM) 5 mg tablet Take 5 mg by mouth every 6 (six) hours as needed for muscle spasms 4/24/18  Yes Historical Provider, MD   HYDROcodone-acetaminophen ([facility]) 5-325 mg per tablet take 1/2 to 1 tablet by mouth every 6 hours if needed for pain 4/6/18  Yes Historical Provider, MD   lactulose (CHRONULAC) 10 g/15 mL solution Take 30 mL (20 g total) by mouth 3 (three) times a day as needed (constipation) for up to 5 days 4/25/18 4/30/18 Yes [address] Jhonatan Oliva PA-C   multivitamin [facility]) TABS Take 1 tablet by mouth   Yes Historical Provider, MD   nitrofurantoin (MACROBID) 100 mg capsule Take 1 capsule (100 mg total) by mouth daily 3/23/18  Yes Kristine Bowles MD   oxyCODONE-acetaminophen (PERCOCET) 5-325 mg per tablet take 1 to 2 tablets by mouth every 4 to 6 hours if needed for pain 4/24/18  Yes Historical Provider, MD   Sennosides 17 2 MG TABS Take 1 tablet (17 2 mg total) by mouth 2 (two) times a day for 7 days 4/25/18 5/2/18 Yes Ian Schwartz PA-C   amoxicillin (AMOXIL) 500 mg capsule Take 500 mg by mouth 3 (three) times a day 4/6/18 4/26/18 Yes Historical Provider, MD HEALY SENNA 8 6 MG tablet take 2 tablets by mouth twice a day for 7 days 4/25/18 4/26/18 Yes Historical Provider, MD   atorvastatin (LIPITOR) 40 mg tablet 40 mg daily   1/18/18 4/26/18  Historical Provider, MD     I have reviewed home medications with patient personally  Allergies:    Allergies   Allergen Reactions    Myrbetriq [Mirabegron] GI Intolerance    Latex Hives and Swelling       Social History:     Marital Status: /Civil Union   Occupation:   Patient Pre-hospital Living Situation:  Resides at home with , has required assistance with ADLs since surgery  Patient Pre-hospital Level of Mobility:   Patient Pre-hospital Diet Restrictions:  None  Substance Use History:   History   Alcohol Use    Yes     Comment: rarely (history)      History   Smoking Status    Never Smoker   Smokeless Tobacco    Never Used     History   Drug Use No       Family History:    non-contributory    Physical Exam:     Vitals:   Blood Pressure: 144/66 (04/26/18 1815)  Pulse: 98 (04/26/18 1815)  Temperature: 98 °F (36 7 °C) (04/26/18 1815)  Temp Source: Oral (04/26/18 1815)  Respirations: 18 (04/26/18 1815)  Height: 5' 7" (170 2 cm) (04/26/18 1815)  Weight - Scale: 72 6 kg (160 lb) (04/26/18 1815)  SpO2: 98 % (04/26/18 1815)    General Appearance:  Alert, cooperative, no distress, appears stated age   Head:  Normocephalic, without obvious abnormality, atraumatic   Neck: Supple   Lungs:   Clear to auscultation bilaterally, respirations unlabored   Chest Wall:  No tenderness or deformity    Heart:  Regular rate and rhythm, S1 and S2 normal, positive systolic murmur heard best in the right 2nd intercostal space, rub or gallop   Abdomen:   Soft, mild tenderness in left lower quadrant, bowel sounds active all four quadrants,  no masses, no organomegaly   Extremities: Extremities normal, atraumatic, no cyanosis or edema   Pulses: 2+ and symmetric all extremities   Skin: Skin color, texture, turgor normal, no rashes or lesions   Back Positive incisional wound, dressing in place, clean/dry/intact no drainage, no surrounding erythema   Neurologic: CNII-XII intact, speech fluent, comprehensible, no facial asymmetry; normal strength 5/5 in all major muscle groups, sensation and reflexes throughout       Additional Data:     Lab Results: I have personally reviewed pertinent reports          Results from last 7 days  Lab Units 04/26/18  1246 04/25/18  0651   WBC Thousand/uL 8 94 9 24   HEMOGLOBIN g/dL 8 8* 9 2*   HEMATOCRIT % 27 0* 28 2*   PLATELETS Thousands/uL 375 370   NEUTROS PCT %  --  87*   LYMPHS PCT %  --  7*   LYMPHO PCT % 4*  --    MONOS PCT %  --  4   MONO PCT MAN % 3*  --    EOS PCT %  --  1   EOSINO PCT MANUAL % 0  --        Results from last 7 days  Lab Units 04/26/18  1246   SODIUM mmol/L 138   POTASSIUM mmol/L 4 0   CHLORIDE mmol/L 102   CO2 mmol/L 26   BUN mg/dL 20   CREATININE mg/dL 0 98   CALCIUM mg/dL 9 8   TOTAL PROTEIN g/dL 6 5   BILIRUBIN TOTAL mg/dL 0 50   ALK PHOS U/L 128*   ALT U/L 30   AST U/L 18   GLUCOSE RANDOM mg/dL 141*       Results from last 7 days  Lab Units 04/26/18  1246   INR  1 08       Imaging: I have personally reviewed pertinent reports  Xr Chest Pa & Lateral    Result Date: 4/3/2018  Narrative: CHEST INDICATION:   M54 5: Low back pain  Preoperative evaluation  COMPARISON:  None EXAM PERFORMED/VIEWS:  XR CHEST PA & LATERAL FINDINGS: Cardiomediastinal silhouette appears unremarkable  The lungs are clear  No pneumothorax or pleural effusion  Osseous structures appear within normal limits for patient age  Impression: No acute cardiopulmonary disease  Workstation performed: [id number]     Us Kidney And Bladder    Result Date: 4/5/2018  Narrative: RENAL ULTRASOUND INDICATION:   R31 29: Other microscopic hematuria  COMPARISON: None TECHNIQUE:   Ultrasound of the retroperitoneum was performed with a curvilinear transducer utilizing volumetric sweeps and still imaging techniques  FINDINGS: KIDNEYS: Symmetric and normal size  Right kidney:  10 cm  Left kidney:  9 3 cm  Right kidney Normal echogenicity and contour  No suspicious masses detected  No hydronephrosis  No shadowing calculi  No perinephric fluid collections  Left kidney Normal echogenicity and contour  No suspicious masses detected  No hydronephrosis  No shadowing calculi  No perinephric fluid collections  URETERS: Nonvisualized   BLADDER: Irregularly thickened bladder wall measuring up to 9 mm  Bilateral ureteral jets was not visualized  Impression: Irregularly thickened bladder wall measuring up to 9 mm  Bilateral ureteral jets was not visualized  No hydronephrosis  Workstation performed: [id number]     Ct Abdomen Pelvis With Contrast    Result Date: 4/26/2018  Narrative: CT ABDOMEN AND PELVIS WITH IV CONTRAST INDICATION:   Abdominal pain and vomiting, recent spinal surgery    COMPARISON: CT abdomen pelvis 4/25/2018 TECHNIQUE:  CT examination of the abdomen and pelvis was performed  Axial, sagittal, and coronal 2D reformatted images were created from the source data and submitted for interpretation  Radiation dose length product (DLP) for this visit:  563 mGy-cm   This examination, like all CT scans performed in the [facility], was performed utilizing techniques to minimize radiation dose exposure, including the use of iterative reconstruction and automated exposure control  IV Contrast:  100 mL of iohexol (OMNIPAQUE) Enteric Contrast:  Enteric contrast was not administered  FINDINGS: ABDOMEN LOWER CHEST:  No clinically significant abnormality identified in the visualized lower chest   Mitral valve annulus calcification noted  LIVER/BILIARY TREE:  Stable right hepatic lobe hypodensity too small to characterize  No intrahepatic biliary dilatation  GALLBLADDER:  No calcified gallstones  No pericholecystic inflammatory change  SPLEEN:  Unremarkable  PANCREAS:  Unremarkable  ADRENAL GLANDS:  Unremarkable  KIDNEYS/URETERS:  One or more sharply circumscribed subcentimeter renal hypodensities are noted  These lesions are too small to accurately characterize, but are statistically most likely to represent benign cortical renal cyst(s)  According to the guidelines published in the [facility] Paper of the ACR Incidental Findings Committee (Radiology 2010), no further workup of these lesions is recommended  No hydronephrosis   STOMACH AND BOWEL:  The stomach is largely collapsed, evaluation limited  The small bowel is normal caliber  A large quantity of colonic stool is again seen in the colon from the cecum to the proximal sigmoid with mild colonic distention throughout this region  At this level there appears to be a mild transition in luminal caliber  There is no focal mass or inflammatory change clearly evident at this site  Findings could represent constipation, again possibility of a partial colonic obstruction possibly due to stricture or occult pathology cannot be completely excluded  APPENDIX:  No findings to suggest appendicitis  ABDOMINOPELVIC CAVITY:  No ascites or free intraperitoneal air  No lymphadenopathy  VESSELS: Atherosclerotic changes abdominal aorta without evidence of aneurysm  PELVIS REPRODUCTIVE ORGANS:  There is suggestion of slight endometrial prominence at 5 mm  Pessary has been removed  URINARY BLADDER:  Contrast material throughout the bladder without filling defects  ABDOMINAL WALL/INGUINAL REGIONS:  Unremarkable  OSSEOUS STRUCTURES:  No acute fracture or destructive osseous lesion  Defect posterior and central right iliac bone, presumably from previous biopsy or surgery  Posterior fusion L3-L5 with interposed disc graft material   Grade 1 anterolisthesis L4 and L5 again noted with chronic inferior endplate deformity of L1  Small fluid collection in the subcutaneous fat overlying the lower back again seen measuring 6 6 x  2 4 cm, possibly seroma  No rim-enhancing or internal air to suggest abscess  Multilevel degenerative changes of the spine with vacuum disc phenomenon at L1-2 and L2-3  Impression: 1  Large quantity of colonic stool is again seen in the colon from the cecum to the proximal sigmoid region with mild diffuse colonic distention  At the sigmoid level there appears to be a mild transition in luminal caliber  There is no focal mass or inflammatory change clearly evident at this site    Findings could represent constipation, again possibility of a partial colonic obstruction possibly due to stricture or occult pathology cannot be completely excluded  Sigmoidoscopy or single contrast barium enema may be considered  2  Borderline prominence of the endometrial stripe  Correlate with pelvic ultrasound  3  Post surgical and degenerative changes of the spine as above  Workstation performed: [id number]     Ct Abdomen Pelvis With Contrast    Result Date: 4/25/2018  Narrative: CT ABDOMEN AND PELVIS WITH IV CONTRAST INDICATION:   abdominal pain/post op spine surgery  Absent bowel movement for 2 days  COMPARISON: Renal ultrasound April 5, 2018  TECHNIQUE:  CT examination of the abdomen and pelvis was performed  Axial, sagittal, and coronal 2D reformatted images were created from the source data and submitted for interpretation  Radiation dose length product (DLP) for this visit:  666 mGy-cm   This examination, like all CT scans performed in the [facility], was performed utilizing techniques to minimize radiation dose exposure, including the use of iterative reconstruction and automated exposure control  IV Contrast:  100 mL of iohexol (OMNIPAQUE) Enteric Contrast:  Enteric contrast was not administered  FINDINGS: ABDOMEN LOWER CHEST:  No clinically significant abnormality identified in the visualized lower chest   The heart is enlarged  Coronary artery calcifications  Valvular calcifications  No evidence of pericardial effusion  LIVER/BILIARY TREE:  One or more subcentimeter sharply circumscribed low-density hepatic lesion(s) are noted, too small to accurately characterize, but statistically most likely to represent subcentimeter hepatic cysts  No suspicious solid hepatic lesion is identified  Hepatic contours are normal   No biliary dilatation  Fatty infiltrative changes are present in the liver  GALLBLADDER:  No calcified gallstones  No pericholecystic inflammatory change  SPLEEN:  Unremarkable  PANCREAS:  Unremarkable  ADRENAL GLANDS:  Unremarkable  KIDNEYS/URETERS:  One or more sharply circumscribed subcentimeter renal hypodensities are noted  These lesions are too small to accurately characterize, but are statistically most likely to represent benign cortical renal cyst(s)  According to the guidelines published in the [facility] Paper of the ACR Incidental Findings Committee (Radiology 2010), no further workup of these lesions is recommended  Kidneys are otherwise unremarkable  No hydronephrosis  STOMACH AND BOWEL:  Evaluation limited due to lack of oral contrast material  Stomach incompletely distended with fluid  Hiatal hernia  No evidence of small bowel obstruction  Large amount of feces throughout the colon, up to the level of the mid sigmoid colon  The mid sigmoid colon and distal sigmoid colon are relatively decompressed  There is mild asymmetric wall thickening of the mid sigmoid colon (series 301, image 61 and series 602, image 73)  Proximally, the colon is moderately distended  Although the findings may represent peristalsis, partially obstructing colonic lesion not excluded  Recommend follow-up colonoscopy  APPENDIX:  Surgically absent  ABDOMINOPELVIC CAVITY:  No ascites or free intraperitoneal air  No lymphadenopathy  VESSELS:  Significant atherosclerotic changes are present in the abdominal aorta  PELVIS REPRODUCTIVE ORGANS:  Unremarkable for patient's age  There is a pessary  URINARY BLADDER:  Mild circumferential wall thickening, similar to renal ultrasound  ABDOMINAL WALL/INGUINAL REGIONS:  Unremarkable  OSSEOUS STRUCTURES:  No acute fracture or destructive osseous lesion  Postoperative changes with laminectomy of L4  Bilateral transpedicular pedicle screws and lateral fixation bars L3, L4 and L5  Bone grafting material laterally from L3 to L5  Intervertebral disc space cages L3-L4 and L4-L5  Postoperative changes in the overlying soft tissues    8 3 x 2 0 x 7 7 cm low-density collection in the subcutaneous tissues with Hounsfield units measuring approximately 22 (series 301, image 49 and series 601, image 71)  Findings most compatible with postoperative seroma/hematoma  Loss in the axial height of the L1 vertebral body along its inferior endplate, likely chronic, given the lack of surrounding paravertebral inflammatory change  Impression: 1  Moderate to severe colonic distention up to the level of the mid sigmoid colon  Distally, the colon is decompressed  There is mild wall thickening of the mid sigmoid colon  Although the findings likely represent peristalsis, colonic neoplasm not excluded  Follow-up colonoscopy recommended  2   Moderate to severe constipation with colonic distention  3   Postoperative changes lumbar spine  The study was marked in [facility] for immediate notification  Workstation performed: [id number]       EKG, Pathology, and Other Studies Reviewed on Admission:   · EKG:     Allscripts Records Reviewed: Yes     ** Please Note: Dragon 360 Dictation voice to text software may have been used in the creation of this document   **

## 2018-04-27 NOTE — SOCIAL WORK
CM met with pt at bedside  CM reviewed obs notification  Pt was agreeable and signed  CM provided copy and placed original in medical records  CM name and role reviewed and Discharge Checklist provided  Encouraged patient and caregiver to review prior to discharge

## 2018-04-29 NOTE — PROGRESS NOTES
Called patient to notify her of abnormal urine results    Bactrim DS called to [facility] [phone]

## 2018-04-30 ENCOUNTER — TELEPHONE (OUTPATIENT)
Dept: INTERNAL MEDICINE CLINIC | Facility: CLINIC | Age: 83
End: 2018-04-30

## 2018-04-30 NOTE — TELEPHONE ENCOUNTER
CALLED PT  STATED SHE WAS SET UP W/ [facility] AND THEY DO NOT HAVE MUCH COVERAGE UP IN THIS AREA , WOULD LIKE FOR YOU TO SET HER UP W/ SOMEONE LOCAL STATED IT WOULD BE UP TO YOU AS SHE IS D /C FROM [facility] FROM HER SURGERY, AND ALSO STATED SHE RECEIVED CALL FROM Nga PITNO OR [facility]   LETTING HER KNOW SHE HAS BLADDER INFECTION AND WOULD NEED A DIFFERENT ABX  -PLEASE ADVISE

## 2018-04-30 NOTE — TELEPHONE ENCOUNTER
This is an URGENT message for Dr Inocente Ames: The patient needs a re-assignment for health care service  Medicare wants this corrected immediately  The 1st home care service that was assigned is too far out of the area- a mix up    Patient had spinal surgery on April 12- was in the hospital for 2 wks  - patient also saw Dr Inocente Ames recently    Surgery was done /[facility] in [facility]    Please call the patient at: p#: [phone]

## 2018-06-07 DIAGNOSIS — I10 HYPERTENSION, UNSPECIFIED TYPE: Primary | ICD-10-CM

## 2018-06-07 RX ORDER — ATENOLOL 25 MG/1
25 TABLET ORAL DAILY
Qty: 90 TABLET | Refills: 1 | Status: SHIPPED | OUTPATIENT
Start: 2018-06-07 | End: 2018-12-13 | Stop reason: SDUPTHER

## 2018-06-07 NOTE — TELEPHONE ENCOUNTER
Atenolol 25 mg - take one tablet a day-90 day supply with 3 refills    Please send to [facility]

## 2018-07-09 ENCOUNTER — TELEPHONE (OUTPATIENT)
Dept: INTERNAL MEDICINE CLINIC | Facility: CLINIC | Age: 83
End: 2018-07-09

## 2018-07-09 NOTE — TELEPHONE ENCOUNTER
PT  ARIANA   HER RX NITROFURANTOIN 100MG TOLD BY [email or web address]  IT   HAD  TO BE   [address]  PT  ASKING  TO SPEAK TO  MARKUS  TO  HELP  HER  WITH  THIS   CALL PT  [phone]

## 2018-07-09 NOTE — TELEPHONE ENCOUNTER
Nat Disla DR  NEEDS TO DO STATEMENT THAT SHE NEEDS THIS MED AS HER INSURANCE IS ONLY WILLING TO PAY FOR 1 RX A YEAR FOR 90 DAYS, WONDERING DOES SHE STILL NEED TO CONTINUE  THIS MED -PLEASE ADVISE, PT  WAS NOTIFIED   IS OUT TILL THURS  AND STATED THIS IS OK AS SHE HAS ENOUGH PILLS FOR 10 DAYS  MSG  PRINTED AND GIVEN TO [facility] AND WILL START PRIOR AUTH

## 2018-07-10 NOTE — TELEPHONE ENCOUNTER
Started process for authorization - Ref # [id number] rose marie sanchez/Suzanne GROVE    @ [facility]

## 2018-07-12 NOTE — TELEPHONE ENCOUNTER
Rechecked on status today, the case is still being reviewed they will fax us confirmation within the next 24 hrs

## 2018-08-29 ENCOUNTER — OFFICE VISIT (OUTPATIENT)
Dept: INTERNAL MEDICINE CLINIC | Facility: CLINIC | Age: 83
End: 2018-08-29
Payer: MEDICARE

## 2018-08-29 VITALS
HEIGHT: 67 IN | BODY MASS INDEX: 24.86 KG/M2 | WEIGHT: 158.4 LBS | DIASTOLIC BLOOD PRESSURE: 76 MMHG | HEART RATE: 61 BPM | OXYGEN SATURATION: 96 % | SYSTOLIC BLOOD PRESSURE: 142 MMHG

## 2018-08-29 DIAGNOSIS — I10 HYPERTENSION, ESSENTIAL: Primary | ICD-10-CM

## 2018-08-29 DIAGNOSIS — E78.2 COMBINED HYPERLIPIDEMIA: ICD-10-CM

## 2018-08-29 DIAGNOSIS — M19.90 ARTHRITIS: ICD-10-CM

## 2018-08-29 DIAGNOSIS — N39.0 RECURRENT URINARY TRACT INFECTION: ICD-10-CM

## 2018-08-29 PROCEDURE — G0439 PPPS, SUBSEQ VISIT: HCPCS | Performed by: INTERNAL MEDICINE

## 2018-08-29 PROCEDURE — 99213 OFFICE O/P EST LOW 20 MIN: CPT | Performed by: INTERNAL MEDICINE

## 2018-08-29 RX ORDER — ATORVASTATIN CALCIUM 40 MG/1
40 TABLET, FILM COATED ORAL DAILY
Qty: 90 TABLET | Refills: 3 | Status: SHIPPED | OUTPATIENT
Start: 2018-08-29 | End: 2019-11-06 | Stop reason: SDUPTHER

## 2018-08-29 NOTE — PATIENT INSTRUCTIONS
Chronic problems are well treated  No immediate symptoms  Revisit in 1 year but call if problems develop

## 2018-08-29 NOTE — PROGRESS NOTES
Assessment/Plan:       Diagnoses and all orders for this visit:    Hypertension, essential    Arthritis    Recurrent urinary tract infection    Combined hyperlipidemia  -     atorvastatin (LIPITOR) 40 mg tablet; Take 1 tablet (40 mg total) by mouth daily          Patient Instructions   Chronic problems are well treated  No immediate symptoms  Revisit in 1 year but call if problems develop  Subjective:      Patient ID: Eduardo Yoder is a 81 y o  female  An elderly patient postop from lumbar surgery presents     Hypertensive treated and stable with no symptoms on atenolol  No side effects    Hyperlipidemic treated and stable on atorvastatin 40 mg daily with no symptoms    Lumbar surgery was done in April and after some initial problems related to pain caused by PT a conservative management with minimal strenuous activities is working and she is essentially pain-free and functional an ADLs walking without ambulatory support    The patient is here also for wellness visit; at this point she is Aging out a wellness parameters  She refuses all vaccines  The following portions of the patient's history were reviewed and updated as appropriate:   She has a past medical history of Alopecia; Cardiac arrhythmia; Compression fracture of L1 lumbar vertebra ([address] ); Occlusion of carotid artery; and Sebaceous cyst ,   does not have any pertinent problems on file  ,   has a past surgical history that includes Appendectomy; Thromboendarterectomy (Left); Colonoscopy; Esophagogastroduodenoscopy; and Tonsillectomy  ,  family history includes No Known Problems in her mother  ,   reports that she has never smoked  She has never used smokeless tobacco  She reports that she drinks alcohol  She reports that she does not use drugs  ,  is allergic to myrbetriq [mirabegron] and latex     Current Outpatient Prescriptions   Medication Sig Dispense Refill    atenolol (TENORMIN) 25 mg tablet Take 1 tablet (25 mg total) by mouth daily 90 tablet 1    atorvastatin (LIPITOR) 40 mg tablet Take 1 tablet (40 mg total) by mouth daily 90 tablet 3    Calcium Carbonate-Vitamin D3 600-400 MG-UNIT TABS 1 tab daily      docusate sodium (COLACE) 100 mg capsule Take 1 capsule (100 mg total) by mouth 2 (two) times a day Hold if having diarrhea  0    multivitamin (THERAGRAN) TABS Take 1 tablet by mouth      nitrofurantoin (MACROBID) 100 mg capsule Take 1 capsule (100 mg total) by mouth daily 90 capsule 3    acetaminophen (TYLENOL) 325 mg tablet Take 2 tablets (650 mg total) by mouth every 6 (six) hours as needed for mild pain 30 tablet 0     No current facility-administered medications for this visit  Review of Systems   Constitutional: Positive for fatigue  Negative for appetite change, chills and fever  HENT: Negative for sore throat and trouble swallowing  Eyes: Negative for pain  Respiratory: Negative for cough, chest tightness, shortness of breath and wheezing  Cardiovascular: Negative for chest pain and leg swelling  Gastrointestinal: Positive for abdominal distention and constipation  Negative for abdominal pain, diarrhea, nausea, rectal pain and vomiting  Endocrine: Negative for cold intolerance and heat intolerance  Genitourinary: Negative for difficulty urinating, dysuria, frequency and pelvic pain  Musculoskeletal: Positive for arthralgias and back pain  Negative for joint swelling  Skin: Negative for rash and wound  Allergic/Immunologic: Negative for immunocompromised state  Neurological: Negative for dizziness, seizures, syncope, light-headedness and headaches  Psychiatric/Behavioral: Negative for dysphoric mood  The patient is not nervous/anxious  Objective:  Vitals:    08/29/18 0850   BP: 142/76   Pulse: 61   SpO2: 96%      Physical Exam   Constitutional: No distress     An 52-year-old female who appears stated age; she is has on a back brace which to me is not doing anything because it is so loose but she insists she needs to wear it  Not in distress at all  Not tachypneic not dyspneic and not using accessory muscles  Cardiovascular: Normal rate  Pulmonary/Chest: Effort normal  No respiratory distress  Abdominal: She exhibits no distension  There is no tenderness  Musculoskeletal: Normal range of motion  Neurological: She is alert  Coordination normal    Psychiatric: She has a normal mood and affect   Judgment normal

## 2018-08-29 NOTE — PROGRESS NOTES
Assessment and Plan:    Problem List Items Addressed This Visit     None        Health Maintenance Due   Topic Date Due    Medicare Annual Wellness Visit (AWV)  1935    DTaP,Tdap,and Td Vaccines (1 - Tdap) 08/24/1957    Pneumococcal PPSV23/PCV13 65+ Years / Low and Medium Risk (1 of 2 - PCV13) 08/24/2001         HPI:  Yohannes Cabrera is a 81 y o  female here for her Subsequent Wellness Visit      Patient Active Problem List   Diagnosis    Arthritis    Hypertension, essential    Combined hyperlipidemia    Pre-operative cardiovascular examination    Microscopic hematuria    Cardiac arrhythmia    Other impaction of intestine (HCC)    Anemia     Past Medical History:   Diagnosis Date    Alopecia     Cardiac arrhythmia     Compression fracture of L1 lumbar vertebra ([address] )     resolved 09/2014    Occlusion of carotid artery     unspecified laterality resolved 08/05/2016    Sebaceous cyst      Past Surgical History:   Procedure Laterality Date    APPENDECTOMY      COLONOSCOPY      resolved 2009    ESOPHAGOGASTRODUODENOSCOPY      mild gastritis resolved 2009    THROMBOENDARTERECTOMY Left     carotid, resloved 12/2012    TONSILLECTOMY       Family History   Problem Relation Age of Onset    No Known Problems Mother      History   Smoking Status    Never Smoker   Smokeless Tobacco    Never Used     History   Alcohol Use    Yes     Comment: rarely (history)       History   Drug Use No       Current Outpatient Prescriptions   Medication Sig Dispense Refill    acetaminophen (TYLENOL) 325 mg tablet Take 2 tablets (650 mg total) by mouth every 6 (six) hours as needed for mild pain 30 tablet 0    aspirin 81 mg chewable tablet Chew 1 tablet daily      atenolol (TENORMIN) 25 mg tablet Take 1 tablet (25 mg total) by mouth daily 90 tablet 1    bisacodyl (DULCOLAX) 10 mg suppository Insert 1 suppository (10 mg total) into the rectum daily as needed for constipation 28 suppository 0    Calcium Carbonate-Vitamin D3 600-400 MG-UNIT TABS 1 tab daily      docusate sodium (COLACE) 100 mg capsule Take 1 capsule (100 mg total) by mouth 2 (two) times a day Hold if having diarrhea  0    multivitamin (THERAGRAN) TABS Take 1 tablet by mouth      nitrofurantoin (MACROBID) 100 mg capsule Take 1 capsule (100 mg total) by mouth daily 90 capsule 3    oxyCODONE-acetaminophen (PERCOCET) 5-325 mg per tablet take 1 to 2 tablets by mouth every 4 to 6 hours if needed for pain  0    polyethylene glycol (MIRALAX) 17 g packet Take 17 g by mouth daily Hold if having diarrhea 14 each 0    Sennosides 17 2 MG TABS Take 1 tablet (17 2 mg total) by mouth 2 (two) times a day for 7 days 14 each 0     No current facility-administered medications for this visit  Allergies   Allergen Reactions    Myrbetriq [Mirabegron] GI Intolerance and Hives    Latex Hives and Swelling     Immunization History   Administered Date(s) Administered    Td (adult), adsorbed 1935       Patient Care Team:  Lucila Ralph MD as PCP - General  Heidi Sweetser-Cohen, PA-C Jillene Siemens, MD    Medicare Screening Tests and Risk Assessments: Sanna Moe is here for her Initial Wellness visit  Health Risk Assessment:  Patient rates overall health as fair  Patient feels that their physical health rating is Slightly worse  Eyesight was rated as Same  Hearing was rated as Same  Patient feels that their emotional and mental health rating is Same  Pain experienced by patient in the last 7 days has been Some  Patient's pain rating has been 4/10  (Additional comments: Spinal surgery performed on 4/12/18)    Emotional/Mental Health:  Patient has been feeling nervous/anxious  PHQ-9 Depression Screening:    Frequency of the following problems over the past two weeks:      1  Little interest or pleasure in doing things: 0 - not at all      2  Feeling down, depressed, or hopeless: 0 - not at all  PHQ-2 Score: 0          Broken Bones/Falls:     Fall Risk Assessment:    In the past year, patient has experienced: No history of falling in past year          Bladder/Bowel:  Patient has leaked urine accidently in the last six months  Patient reports no loss of bowel control  Immunizations:  Patient has not had a flu vaccination within the last year  Patient has not received a pneumonia shot  Patient has not received a shingles shot  Patient has not received tetanus/diphtheria shot  Home Safety:  Patient does not have trouble with stairs inside or outside of their home  Patient currently reports that there are no safety hazards present in home, working smoke alarms, working carbon monoxide detectors  Preventative Screenings:   Breast cancer screening performed, no colon cancer screen completed, cholesterol screen completed, glaucoma eye exam completed, 1/1/2018  (Additional Comments: Dr Pretty Brannon)    Nutrition:  Current diet: Regular and Limited junk food with servings of the following:    Medications:  Patient is currently taking over-the-counter supplements  List of OTC medications includes: Vitamins/calicum see medication list  Patient is able to manage medications  Lifestyle Choices:  Patient reports no tobacco use  Patient has not smoked or used tobacco in the past   Patient reports no alcohol use  Patient drives a vehicle  Patient wears seat belt      Current level of exercise of physical activity described by patient as: minimal   (Additional Comments: Walking 1 1/2 hr daily )    Activities of Daily Living:  Can get out of bed by his or her self, able to dress self, able to make own meals, unable to do own shopping, able to bathe self, can do own laundry/housekeeping, can manage own money, pay bills and track expenses    Previous Hospitalizations:  Hospitalization or ED visit in past 12 months  Number of hospitalizations within the last year: 1-2  Additional Comments: Spinal surgery 4/18    Constipation 4/30/18    Advanced Directives:  Patient has decided on a power of   Patient has spoken to designated power of   Patient has completed advanced directive  Preventative Screening/Counseling:      Cardiovascular:      General: Screening Current          Diabetes:      General: Screening Current          Colorectal Cancer:      General: Screening Current          Breast Cancer:      General: Screening Current          Cervical Cancer:      General: Screening Not Indicated          Osteoporosis:      General: Risks and Benefits Discussed      Counseling: Calcium and Vitamin D Intake          AAA:      General: Screening Not Indicated          Glaucoma:      General: Risks and Benefits Discussed          HIV:      General: Screening Not Indicated          Hepatitis C:      General: Screening Not Indicated        Advanced Directives:   Patient has living will for healthcare, has durable POA for healthcare, patient has an advanced directive  Information on ACP and/or AD provided  5 wishes given  End of life assessment reviewed with patient  Provider agrees with end of life decisions        Immunizations:  Patient reviewed and up to date      Influenza: Patient Declines      Pneumococcal: Patient Declines      Shingrix: Patient Declines      Hepatitis B (Low risk patients): Series Not Indicated      Zostavax: Patient Declines      TD: Patient Declines

## 2018-10-16 ENCOUNTER — TELEPHONE (OUTPATIENT)
Dept: INTERNAL MEDICINE CLINIC | Facility: CLINIC | Age: 83
End: 2018-10-16

## 2018-10-16 NOTE — TELEPHONE ENCOUNTER
Patient called-said she's having symptoms of a UTI   Asking if Dr Pema Renae could write her an order for a lab test  Please call her at PH#[phone]

## 2018-10-16 NOTE — TELEPHONE ENCOUNTER
Pt cld asking us to send her OB/GYN her last urine culture  I explained to her that she would either need to sign a release form or have her doctors office request it  She stated she couldn't  her because she isn't feeling well  I also offered to mail her the results but she denied  She would like Tatum to call her right away and stated that it is urgent  [phone]

## 2018-10-17 ENCOUNTER — APPOINTMENT (OUTPATIENT)
Dept: LAB | Facility: CLINIC | Age: 83
End: 2018-10-17
Payer: MEDICARE

## 2018-10-17 DIAGNOSIS — R30.0 DYSURIA: ICD-10-CM

## 2018-10-17 DIAGNOSIS — R30.0 DYSURIA: Primary | ICD-10-CM

## 2018-10-17 RX ORDER — CEPHALEXIN 250 MG/1
500 CAPSULE ORAL EVERY 6 HOURS SCHEDULED
Qty: 28 CAPSULE | Refills: 0 | Status: SHIPPED | OUTPATIENT
Start: 2018-10-17 | End: 2018-10-20

## 2018-10-18 ENCOUNTER — TELEPHONE (OUTPATIENT)
Dept: INTERNAL MEDICINE CLINIC | Facility: CLINIC | Age: 83
End: 2018-10-18

## 2018-10-18 ENCOUNTER — APPOINTMENT (OUTPATIENT)
Dept: LAB | Facility: CLINIC | Age: 83
End: 2018-10-18
Payer: MEDICARE

## 2018-10-18 ENCOUNTER — HOSPITAL ENCOUNTER (EMERGENCY)
Facility: HOSPITAL | Age: 83
Discharge: HOME | End: 2018-10-18
Attending: EMERGENCY MEDICINE | Admitting: EMERGENCY MEDICINE
Payer: MEDICARE

## 2018-10-18 VITALS
DIASTOLIC BLOOD PRESSURE: 77 MMHG | RESPIRATION RATE: 20 BRPM | HEART RATE: 68 BPM | TEMPERATURE: 97.7 F | SYSTOLIC BLOOD PRESSURE: 176 MMHG | OXYGEN SATURATION: 95 %

## 2018-10-18 DIAGNOSIS — R33.9 URINARY RETENTION: Primary | ICD-10-CM

## 2018-10-18 DIAGNOSIS — N39.0 URINARY TRACT INFECTION: ICD-10-CM

## 2018-10-18 LAB
BACTERIA UR QL AUTO: ABNORMAL /HPF
BILIRUB UR QL STRIP: NEGATIVE
CLARITY UR: ABNORMAL
COLOR UR: YELLOW
GLUCOSE UR STRIP-MCNC: NEGATIVE MG/DL
HGB UR QL STRIP.AUTO: ABNORMAL
KETONES UR STRIP-MCNC: NEGATIVE MG/DL
LEUKOCYTE ESTERASE UR QL STRIP: ABNORMAL
NITRITE UR QL STRIP: POSITIVE
NON-SQ EPI CELLS URNS QL MICRO: ABNORMAL /HPF
PH UR STRIP.AUTO: 6.5 [PH] (ref 4.5–8)
PROT UR STRIP-MCNC: NEGATIVE MG/DL
RBC #/AREA URNS AUTO: ABNORMAL /HPF
SP GR UR STRIP.AUTO: 1.01 (ref 1–1.03)
UROBILINOGEN UR QL STRIP.AUTO: 0.2 E.U./DL
WBC #/AREA URNS AUTO: ABNORMAL /HPF

## 2018-10-18 PROCEDURE — 81001 URINALYSIS AUTO W/SCOPE: CPT | Performed by: INTERNAL MEDICINE

## 2018-10-18 PROCEDURE — 87077 CULTURE AEROBIC IDENTIFY: CPT

## 2018-10-18 PROCEDURE — 99283 EMERGENCY DEPT VISIT LOW MDM: CPT

## 2018-10-18 PROCEDURE — 87186 SC STD MICRODIL/AGAR DIL: CPT

## 2018-10-18 PROCEDURE — 87086 URINE CULTURE/COLONY COUNT: CPT

## 2018-10-18 RX ORDER — DICYCLOMINE HCL 20 MG
20 TABLET ORAL ONCE
Status: COMPLETED | OUTPATIENT
Start: 2018-10-18 | End: 2018-10-18

## 2018-10-18 RX ORDER — DICYCLOMINE HCL 20 MG
20 TABLET ORAL 2 TIMES DAILY PRN
Qty: 10 TABLET | Refills: 0 | Status: SHIPPED | OUTPATIENT
Start: 2018-10-18 | End: 2019-09-16

## 2018-10-18 RX ADMIN — DICYCLOMINE HYDROCHLORIDE 20 MG: 20 TABLET ORAL at 19:03

## 2018-10-18 NOTE — ED PROVIDER NOTES
History  Chief Complaint   Patient presents with    Urinary Retention     pt presents ambulatory with c/o acute UTI, was put on keflex yesterday by PCP but reports today she is not able to pass almost any urine  last voided early this morning and again in the waiting room  c/o pressure and urgency      HPI   52-year-old female presents to the emergency department with complaint of urinary retention  Patient states that over the past few days she has had urinary retention followed by urinary urgency and occasional incontinence  She called her PCP, who ordered a urinalysis and started her empirically on Macrobid and Keflex pending culture results  (UA result reveals nitrite positive urine, innumerable bacteria)  She states that she began the antibiotics 2 days ago, but that retention has worsened  She reports feeling the urge to urinate, but is often unable to pass any urine  She has intermittently been able to urinate, but has often been unable to make it to the bathroom  She reports having had associated suprapubic discomfort that improves with urination  Tonight she also began to note some discomfort in her back, which also improved with urination  She presents to the emergency department tonight with concern that antibiotics may be contributing to her urinary retention  On ROS, patient denies any recent fevers, chills, chest pain, shortness of breath, flank pain, nausea, vomiting, dysuria, hematuria, lower extremity weakness, numbness, paresthesias, saddle anesthesia, or complaints other than stated above  Patient does admit to having had similar urinary symptoms in the past with UTI, but states that symptoms have never been this severe  Prior to Admission Medications   Prescriptions Last Dose Informant Patient Reported? Taking?    Calcium Carbonate-Vitamin D3 600-400 MG-UNIT TABS  Self Yes No   Si tab daily   acetaminophen (TYLENOL) 325 mg tablet   No No   Sig: Take 2 tablets (650 mg total) by mouth every 6 (six) hours as needed for mild pain   atenolol (TENORMIN) 25 mg tablet   No No   Sig: Take 1 tablet (25 mg total) by mouth daily   atorvastatin (LIPITOR) 40 mg tablet   No No   Sig: Take 1 tablet (40 mg total) by mouth daily   cephalexin (KEFLEX) 250 mg capsule   No No   Sig: Take 2 capsules (500 mg total) by mouth every 6 (six) hours for 28 days   docusate sodium (COLACE) 100 mg capsule   No No   Sig: Take 1 capsule (100 mg total) by mouth 2 (two) times a day Hold if having diarrhea   multivitamin (THERAGRAN) TABS  Self Yes No   Sig: Take 1 tablet by mouth   nitrofurantoin (MACROBID) 100 mg capsule  Self No No   Sig: Take 1 capsule (100 mg total) by mouth daily      Facility-Administered Medications: None       Past Medical History:   Diagnosis Date    Alopecia     Cardiac arrhythmia     Compression fracture of L1 lumbar vertebra (HCC)     resolved 09/2014    Occlusion of carotid artery     unspecified laterality resolved 08/05/2016    Sebaceous cyst     UTI (urinary tract infection)        Past Surgical History:   Procedure Laterality Date    APPENDECTOMY      COLONOSCOPY      resolved 2009    ESOPHAGOGASTRODUODENOSCOPY      mild gastritis resolved 2009    THROMBOENDARTERECTOMY Left     carotid, resloved 12/2012    TONSILLECTOMY         Family History   Problem Relation Age of Onset    No Known Problems Mother      I have reviewed and agree with the history as documented  Social History   Substance Use Topics    Smoking status: Never Smoker    Smokeless tobacco: Never Used    Alcohol use Yes      Comment: rarely (history)         Review of Systems   Constitutional: Negative for chills and fever  Respiratory: Negative for shortness of breath  Gastrointestinal: Negative for abdominal pain, nausea and vomiting  Genitourinary: Positive for difficulty urinating, pelvic pain and urgency  Negative for dysuria, flank pain and hematuria  Musculoskeletal: Negative for gait problem  Skin: Negative for rash and wound  Allergic/Immunologic: Negative for immunocompromised state  Neurological: Negative for headaches  Psychiatric/Behavioral: The patient is not nervous/anxious  All other systems reviewed and are negative  Physical Exam  Physical Exam   Constitutional: She is oriented to person, place, and time  She appears well-nourished  No distress  HENT:   Head: Normocephalic and atraumatic  Eyes: EOM are normal    Neck: Normal range of motion  Neck supple  Cardiovascular: Normal rate and regular rhythm  Pulmonary/Chest: Effort normal and breath sounds normal  No respiratory distress  Abdominal: Soft  She exhibits no distension  There is tenderness in the suprapubic area  There is no CVA tenderness  Musculoskeletal: Normal range of motion  Neurological: She is alert and oriented to person, place, and time  Skin: Skin is warm and dry  She is not diaphoretic  Psychiatric: She has a normal mood and affect  Her behavior is normal    Nursing note and vitals reviewed        Vital Signs  ED Triage Vitals [10/18/18 1749]   Temperature Pulse Respirations Blood Pressure SpO2   97 7 °F (36 5 °C) 68 20 (!) 176/77 95 %      Temp Source Heart Rate Source Patient Position - Orthostatic VS BP Location FiO2 (%)   Oral Monitor -- -- --      Pain Score       --           Vitals:    10/18/18 1749   BP: (!) 176/77   Pulse: 68       Visual Acuity      ED Medications  Medications   dicyclomine (BENTYL) tablet 20 mg (20 mg Oral Given 10/18/18 1903)       Diagnostic Studies  Results Reviewed     None                 No orders to display              Procedures  Procedures       Phone Contacts  ED Phone Contact    ED Course  ED Course as of Oct 19 0349   Thu Oct 18, 2018   1959 Patient able to void -                                 MDM  Number of Diagnoses or Management Options  Urinary retention:   Urinary tract infection:   Diagnosis management comments: 27-year-old female with urinary retention, urinary tract infection on UA this morning  Patient was empirically started on antibiotics by PCP, concerned that antibiotics may be causing retention  Provided reassurance, time necessary for antibiotics to treat infection and resolve symptoms  Shared decision making: Will hold off on placement of Valdez catheter as patient is able to urinate while in the emergency department (though not able to completely empty her bladder)  Will treat bladder spasm with Bentyl and discharge home with instructions to continue antibiotics, return to the emergency department for new or worsening symptoms  UA from this morning available in EPIC  Culture pending, PCP will follow up  CritCare Time    Disposition  Final diagnoses:   Urinary retention   Urinary tract infection     Time reflects when diagnosis was documented in both MDM as applicable and the Disposition within this note     Time User Action Codes Description Comment    10/18/2018  7:58 PM Jamey Stanley Add [R33 9] Urinary retention     10/18/2018  7:58 PM Kasia Torres Add [N39 0] Urinary tract infection       ED Disposition     ED Disposition Condition Comment    Discharge  Priya Lee discharge to home/self care      Condition at discharge: Good        Follow-up Information     Follow up With Specialties Details Why Contact Info Additional Information    Yunier Hale MD Internal Medicine  As needed [address] Emergency Department Emergency Medicine  If symptoms worsen [address] [address]  [phone] [place] [facility], [address] [place], [place], [facility]          Discharge Medication List as of 10/18/2018  7:59 PM      START taking these medications    Details   dicyclomine (BENTYL) 20 mg tablet Take 1 tablet (20 mg total) by mouth 2 (two) times a day as needed (abdominal spasm) for up to 5 days, Starting [facility] 10/18/2018, Until Tue 10/23/2018, Print         CONTINUE these medications which have NOT CHANGED    Details   acetaminophen (TYLENOL) 325 mg tablet Take 2 tablets (650 mg total) by mouth every 6 (six) hours as needed for mild pain, Starting Fri 4/27/2018, No Print      atenolol (TENORMIN) 25 mg tablet Take 1 tablet (25 mg total) by mouth daily, Starting Thu 6/7/2018, Normal      atorvastatin (LIPITOR) 40 mg tablet Take 1 tablet (40 mg total) by mouth daily, Starting Wed 8/29/2018, Normal      Calcium Carbonate-Vitamin D3 600-400 MG-UNIT TABS 1 tab daily, Historical Med      cephalexin (KEFLEX) 250 mg capsule Take 2 capsules (500 mg total) by mouth every 6 (six) hours for 28 days, Starting Wed 10/17/2018, Until Wed 11/14/2018, Normal      docusate sodium (COLACE) 100 mg capsule Take 1 capsule (100 mg total) by mouth 2 (two) times a day Hold if having diarrhea, Starting Fri 4/27/2018, No Print      multivitamin (THERAGRAN) TABS Take 1 tablet by mouth, Historical Med      nitrofurantoin (MACROBID) 100 mg capsule Take 1 capsule (100 mg total) by mouth daily, Starting Fri 3/23/2018, Normal           No discharge procedures on file      ED Provider  Electronically Signed by           Silvano Bedoya MD  10/19/18 2485

## 2018-10-18 NOTE — TELEPHONE ENCOUNTER
SPOKE TO DR Lasha Atkins , STATED STILL WAITING ON CULTURE RESULTS AND SHE IS TO CONTINUE KEFLEX AND MACROBID BID , CALLED PT WAS NOTIFIED WAITING ON CULTURE AND IS TO CONTINUE KEFLEX AND MACROBID BID, SHE STATED SHE WILL WAIT FOR CULTURE AS CAN NOT TAKE  KEFLEX

## 2018-10-18 NOTE — TELEPHONE ENCOUNTER
SANFORD CALLED BACK BECAUSE SHE HASNT HEARD ANYTHING BACK ABOUT HER MESSAGE FROM YESTERDAY OCT 17   PLEASE CONTACT -[phone]

## 2018-10-18 NOTE — TELEPHONE ENCOUNTER
Patient called and wanted to see if Dr Blake could prescribe something else   She is intense pain    [phone]

## 2018-10-18 NOTE — TELEPHONE ENCOUNTER
She saw  yesterday and he prescribed Keflex 250 mg  For UTI  She took 2 yesterday & through the night she had pain and couldn't urinate    She's not taking it today and needs to speak to Dr GAYATHRI Saini This morning    Please

## 2018-10-19 ENCOUNTER — TELEPHONE (OUTPATIENT)
Dept: INTERNAL MEDICINE CLINIC | Facility: CLINIC | Age: 83
End: 2018-10-19

## 2018-10-19 NOTE — TELEPHONE ENCOUNTER
----- Message from Irving Irene MD sent at 10/19/2018 12:55 PM EDT -----  Urine culture  The germ found is probably able to resist the Macrobid so she needs to discontinue that drug but continue the Keflex    Call us Monday to let us know how she did

## 2018-10-20 DIAGNOSIS — N30.00 ACUTE CYSTITIS WITHOUT HEMATURIA: Primary | ICD-10-CM

## 2018-10-20 LAB — BACTERIA UR CULT: ABNORMAL

## 2018-10-20 RX ORDER — LEVOFLOXACIN 500 MG/1
500 TABLET, FILM COATED ORAL EVERY 24 HOURS
Qty: 10 TABLET | Refills: 0 | Status: SHIPPED | OUTPATIENT
Start: 2018-10-20 | End: 2018-10-30

## 2018-10-20 RX ORDER — LEVOFLOXACIN 500 MG/1
500 TABLET, FILM COATED ORAL EVERY 24 HOURS
Qty: 10 TABLET | Refills: 0 | Status: SHIPPED | OUTPATIENT
Start: 2018-10-20 | End: 2018-10-20 | Stop reason: SDUPTHER

## 2018-10-21 ENCOUNTER — HOSPITAL ENCOUNTER (EMERGENCY)
Facility: HOSPITAL | Age: 83
Discharge: HOME | End: 2018-10-21
Attending: EMERGENCY MEDICINE | Admitting: EMERGENCY MEDICINE
Payer: MEDICARE

## 2018-10-21 ENCOUNTER — APPOINTMENT (EMERGENCY)
Dept: CT IMAGING | Facility: HOSPITAL | Age: 83
End: 2018-10-21
Payer: MEDICARE

## 2018-10-21 VITALS
RESPIRATION RATE: 18 BRPM | BODY MASS INDEX: 25.06 KG/M2 | SYSTOLIC BLOOD PRESSURE: 144 MMHG | TEMPERATURE: 97.6 F | HEART RATE: 78 BPM | WEIGHT: 160 LBS | DIASTOLIC BLOOD PRESSURE: 60 MMHG | OXYGEN SATURATION: 98 %

## 2018-10-21 DIAGNOSIS — N39.0 UTI (URINARY TRACT INFECTION): Primary | ICD-10-CM

## 2018-10-21 DIAGNOSIS — R32 URINARY INCONTINENCE: ICD-10-CM

## 2018-10-21 DIAGNOSIS — N32.3 BLADDER DIVERTICULUM: ICD-10-CM

## 2018-10-21 DIAGNOSIS — R33.9 URINARY RETENTION: ICD-10-CM

## 2018-10-21 LAB
ANION GAP SERPL CALCULATED.3IONS-SCNC: 7 MMOL/L (ref 4–13)
BACTERIA UR QL AUTO: ABNORMAL /HPF
BASOPHILS # BLD AUTO: 0.01 THOUSANDS/ΜL (ref 0–0.1)
BASOPHILS NFR BLD AUTO: 0 % (ref 0–1)
BILIRUB UR QL STRIP: NEGATIVE
BUN SERPL-MCNC: 17 MG/DL (ref 5–25)
CALCIUM SERPL-MCNC: 10.1 MG/DL (ref 8.3–10.1)
CHLORIDE SERPL-SCNC: 100 MMOL/L (ref 100–108)
CLARITY UR: CLEAR
CO2 SERPL-SCNC: 27 MMOL/L (ref 21–32)
COLOR UR: YELLOW
CREAT SERPL-MCNC: 1.08 MG/DL (ref 0.6–1.3)
EOSINOPHIL # BLD AUTO: 0.06 THOUSAND/ΜL (ref 0–0.61)
EOSINOPHIL NFR BLD AUTO: 2 % (ref 0–6)
ERYTHROCYTE [DISTWIDTH] IN BLOOD BY AUTOMATED COUNT: 13.7 % (ref 11.6–15.1)
GFR SERPL CREATININE-BSD FRML MDRD: 48 ML/MIN/1.73SQ M
GLUCOSE SERPL-MCNC: 107 MG/DL (ref 65–140)
GLUCOSE UR STRIP-MCNC: NEGATIVE MG/DL
HCT VFR BLD AUTO: 34.9 % (ref 34.8–46.1)
HGB BLD-MCNC: 11.5 G/DL (ref 11.5–15.4)
HGB UR QL STRIP.AUTO: NEGATIVE
IMM GRANULOCYTES # BLD AUTO: 0.01 THOUSAND/UL (ref 0–0.2)
IMM GRANULOCYTES NFR BLD AUTO: 0 % (ref 0–2)
KETONES UR STRIP-MCNC: NEGATIVE MG/DL
LEUKOCYTE ESTERASE UR QL STRIP: ABNORMAL
LYMPHOCYTES # BLD AUTO: 0.79 THOUSANDS/ΜL (ref 0.6–4.47)
LYMPHOCYTES NFR BLD AUTO: 19 % (ref 14–44)
MCH RBC QN AUTO: 30.4 PG (ref 26.8–34.3)
MCHC RBC AUTO-ENTMCNC: 33 G/DL (ref 31.4–37.4)
MCV RBC AUTO: 92 FL (ref 82–98)
MONOCYTES # BLD AUTO: 0.45 THOUSAND/ΜL (ref 0.17–1.22)
MONOCYTES NFR BLD AUTO: 11 % (ref 4–12)
NEUTROPHILS # BLD AUTO: 2.8 THOUSANDS/ΜL (ref 1.85–7.62)
NEUTS SEG NFR BLD AUTO: 68 % (ref 43–75)
NITRITE UR QL STRIP: NEGATIVE
NON-SQ EPI CELLS URNS QL MICRO: ABNORMAL /HPF
NRBC BLD AUTO-RTO: 0 /100 WBCS
PH UR STRIP.AUTO: 6.5 [PH] (ref 4.5–8)
PLATELET # BLD AUTO: 172 THOUSANDS/UL (ref 149–390)
PMV BLD AUTO: 10.3 FL (ref 8.9–12.7)
POTASSIUM SERPL-SCNC: 4.4 MMOL/L (ref 3.5–5.3)
PROT UR STRIP-MCNC: NEGATIVE MG/DL
RBC # BLD AUTO: 3.78 MILLION/UL (ref 3.81–5.12)
RBC #/AREA URNS AUTO: ABNORMAL /HPF
SODIUM SERPL-SCNC: 134 MMOL/L (ref 136–145)
SP GR UR STRIP.AUTO: 1.01 (ref 1–1.03)
UROBILINOGEN UR QL STRIP.AUTO: 0.2 E.U./DL
WBC # BLD AUTO: 4.12 THOUSAND/UL (ref 4.31–10.16)
WBC #/AREA URNS AUTO: ABNORMAL /HPF

## 2018-10-21 PROCEDURE — 36415 COLL VENOUS BLD VENIPUNCTURE: CPT | Performed by: EMERGENCY MEDICINE

## 2018-10-21 PROCEDURE — 74177 CT ABD & PELVIS W/CONTRAST: CPT

## 2018-10-21 PROCEDURE — 99284 EMERGENCY DEPT VISIT MOD MDM: CPT

## 2018-10-21 PROCEDURE — 85025 COMPLETE CBC W/AUTO DIFF WBC: CPT | Performed by: EMERGENCY MEDICINE

## 2018-10-21 PROCEDURE — 87086 URINE CULTURE/COLONY COUNT: CPT | Performed by: EMERGENCY MEDICINE

## 2018-10-21 PROCEDURE — 81001 URINALYSIS AUTO W/SCOPE: CPT | Performed by: EMERGENCY MEDICINE

## 2018-10-21 PROCEDURE — 80048 BASIC METABOLIC PNL TOTAL CA: CPT | Performed by: EMERGENCY MEDICINE

## 2018-10-21 RX ADMIN — IOHEXOL 100 ML: 350 INJECTION, SOLUTION INTRAVENOUS at 17:38

## 2018-10-21 NOTE — DISCHARGE INSTRUCTIONS
Urinary Tract Infection in Women   WHAT YOU NEED TO KNOW:   A urinary tract infection (UTI) is caused by bacteria that get inside your urinary tract  Most bacteria that enter your urinary tract come out when you urinate  If the bacteria stay in your urinary tract, you may get an infection  Your urinary tract includes your kidneys, ureters, bladder, and urethra  Urine is made in your kidneys, and it flows from the ureters to the bladder  Urine leaves the bladder through the urethra  A UTI is more common in your lower urinary tract, which includes your bladder and urethra  DISCHARGE INSTRUCTIONS:   Return to the emergency department if:   · You are urinating very little or not at all  · You have a high fever with shaking chills  · You have side or back pain that gets worse  Contact your healthcare provider if:   · You have a fever  · You do not feel better after 2 days of taking antibiotics  · You are vomiting  · You have questions or concerns about your condition or care  Medicines:   · Antibiotics  help fight a bacterial infection  · Medicines  may be given to decrease pain and burning when you urinate  They will also help decrease the feeling that you need to urinate often  These medicines will make your urine orange or red  · Take your medicine as directed  Contact your healthcare provider if you think your medicine is not helping or if you have side effects  Tell him or her if you are allergic to any medicine  Keep a list of the medicines, vitamins, and herbs you take  Include the amounts, and when and why you take them  Bring the list or the pill bottles to follow-up visits  Carry your medicine list with you in case of an emergency  Follow up with your healthcare provider as directed:  Write down your questions so you remember to ask them during your visits  Prevent another UTI:   · Empty your bladder often  Urinate and empty your bladder as soon as you feel the need   Do not hold your urine for long periods of time  · Wipe from front to back after you urinate or have a bowel movement  This will help prevent germs from getting into your urinary tract through your urethra  · Drink liquids as directed  Ask how much liquid to drink each day and which liquids are best for you  You may need to drink more liquids than usual to help flush out the bacteria  Do not drink alcohol, caffeine, or citrus juices  These can irritate your bladder and increase your symptoms  Your healthcare provider may recommend cranberry juice to help prevent a UTI  · Urinate after you have sex  This can help flush out bacteria passed during sex  · Do not douche or use feminine deodorants  These can change the chemical balance in your vagina  · Change sanitary pads or tampons often  This will help prevent germs from getting into your urinary tract  · Do pelvic muscle exercises often  Pelvic muscle exercises may help you start and stop urinating  Strong pelvic muscles may help you empty your bladder easier  Squeeze these muscles tightly for 5 seconds like you are trying to hold back urine  Then relax for 5 seconds  Gradually work up to squeezing for 10 seconds  Do 3 sets of 15 repetitions a day, or as directed  © 2017 [employer]  Information is for End User's use only and may not be sold, redistributed or otherwise used for commercial purposes  All illustrations and images included in CareNotes® are the copyrighted property of [employer]  or [employer] Mendo[employer]  The above information is an  only  It is not intended as medical advice for individual conditions or treatments  Talk to your doctor, nurse or pharmacist before following any medical regimen to see if it is safe and effective for you  Valdez Catheter Placement and Care   WHAT YOU NEED TO KNOW:   A Valdez catheter is a sterile tube that is inserted into your bladder to drain urine   It is also called an indwelling urinary catheter  The tip of the catheter has a small balloon filled with solution that holds the catheter inside your bladder  DISCHARGE INSTRUCTIONS:   Return to the emergency department if:   · Your catheter comes out  · You suddenly have material that looks like sand in the tubing or drainage bag  · No urine is draining into the bag and you have checked the system  · You have pain in your hip, back, pelvis, or lower abdomen  · You are confused or cannot think clearly  Contact your healthcare provider if:   · You have a fever  · You have bladder spasms for more than 1 day after the catheter is placed  · You see blood in the tubing or drainage bag  · You have a rash or itching where the catheter tube is secured to your skin  · Urine leaks from or around the catheter, tubing, or drainage bag  · The closed drainage system has accidently come open or apart  · You see a layer of crystals inside the tubing  · You have questions or concerns about your condition or care  Care for your Valdez catheter:   · Clean your genital area 2 times every day  Clean your catheter and the area around where it was inserted  Use soap and water  Clean your anal opening and catheter area after every bowel movement  · Secure the catheter tube  so you do not pull or move the catheter  This helps prevent pain and bladder spasms  Healthcare providers will show you how to use medical tape or a strap to secure the catheter tube to your body  · Keep a closed drainage system  Your Valdez catheter should always be attached to the drainage bag to form a closed system  Do not disconnect any part of the closed system unless you need to change the bag  Care for your drainage bag:   · Ask if a leg bag is right for you  A leg bag can be worn under your clothes  Ask your healthcare provider for more information about a leg bag       · Keep the drainage bag below the level of your waist   This helps stop urine from moving back up the tubing and into your bladder  Do not loop or kink the tubing  This can cause urine to back up and collect in your bladder  Do not let the drainage bag touch or lie on the floor  · Empty the drainage bag when needed  The weight of a full drainage bag can be painful  Empty the drainage bag every 3 to 6 hours or when it is ? full  · Clean and change the drainage bag as directed  Ask your healthcare provider how often you should change the drainage bag and what cleaning solution to use  Wear disposable gloves when you change the bag  Do not allow the end of the catheter or tubing to touch anything  Clean the ends with an alcohol pad before you reconnect them  What to do if problems develop:   · No urine is draining into the bag:      ¨ Check for kinks in the tubing and straighten them out  ¨ Check the tape or strap used to secure the catheter tube to your skin  Make sure it is not blocking the tube  ¨ Make sure you are not sitting or lying on the tubing  ¨ Make sure the urine bag is hanging below the level of your waist     · Urine leaks from or around the catheter, tubing, or drainage bag:  Check if the closed drainage system has accidently come open or apart  Clean the catheter and tubing ends with a new alcohol pad and reconnect them  Prevent an infection:   · Wash your hands often  Wash before and after you touch your catheter, tubing, or drainage bag  Use soap and water  Wear clean disposable gloves when you care for your catheter or disconnect the drainage bag  Wash your hands before you prepare or eat food  · Drink liquids as directed  Ask your healthcare provider how much liquid to drink each day and which liquids are best for you  Liquids will help flush your kidneys and bladder to help prevent infection    Follow up with your healthcare provider as directed:  Write down your questions so you remember to ask them during your visits  © 2017 [facility] Information is for End User's use only and may not be sold, redistributed or otherwise used for commercial purposes  All illustrations and images included in CareNotes® are the copyrighted property of A [employer] M [employer]  or [employer]  The above information is an  only  It is not intended as medical advice for individual conditions or treatments  Talk to your doctor, nurse or pharmacist before following any medical regimen to see if it is safe and effective for you

## 2018-10-22 ENCOUNTER — TELEPHONE (OUTPATIENT)
Dept: UROLOGY | Facility: CLINIC | Age: 83
End: 2018-10-22

## 2018-10-22 ENCOUNTER — TELEPHONE (OUTPATIENT)
Dept: INTERNAL MEDICINE CLINIC | Facility: CLINIC | Age: 83
End: 2018-10-22

## 2018-10-22 LAB — BACTERIA UR CULT: NORMAL

## 2018-10-22 NOTE — TELEPHONE ENCOUNTER
New patient for hospital follow up for antibiotic resistant UTI and has catheter and was told she needs to be seen today  Triaged to nurse  Please schedule and call  Reason for appointment/Complaint/Diagnosis : hospital follow up, antibiotic resistant UTI, catheter    Insurance: Medicare A/B, [employer]    History of Cancer? no                       If yes, what kind? none    Previous urologist?     no                  Records requested/where? [address]    Outside testing/where? no    Location Preference for office visit?  [place], or [place]

## 2018-10-22 NOTE — TELEPHONE ENCOUNTER
Trice Carrera from [facility] office called to get patient an appointment today  Made Trice Carrera aware that there are no providers at the [facility] office today so patient was offered an appointment today in the [place] office with Dr Phelps and patient refused  Patient is currently scheduled for appointment tomorrow at the [facility] office at 10:00am with Carlos Leon

## 2018-10-22 NOTE — TELEPHONE ENCOUNTER
FELIX CALLED ME BACK  AND STATED PT  WAS OFFERED APPT  FOR TODAY IN [place],[facility]- DOES HAVE APPT  TOMORROW  AT 10 AM W/ DR Davon Colmenares, CALLED PT  AND WAS NOTIFIED AND WILL KEEP APPT   FOR TOMORROW

## 2018-10-22 NOTE — TELEPHONE ENCOUNTER
Called patient and attempted to schedule her for appointment today with Lenin  Patient stated that she did NOT want to come to [place]  She stated that she wanted to see Will Sandoval  Informed patient that Ab Bella does not have any open time today since he is going to the OR after his last patient at [facility]  Offered her an appointment with Brian Pete 10/23 at [address] [place] [address] in [place] and she does not want that appointment either  Patient states that she ONLY wants today and to go to [place] location to see a doctor  Informed patient that we not NOT have any MD's in that office today  Again offered Samir appointment at 3:15 and she denied and wants to be seen in [place] only today! Kept informing patient that I do not have an available appointment there today and she declined any other appointment that was offered  Informed RN in [place] of patient and she is aware that patient is to be seen tomorrow due to availability  RN is aware that 10/22 appointment was provided but patient declined  Patient stated that she will call [place] office herself to make appointment

## 2018-10-22 NOTE — TELEPHONE ENCOUNTER
Patient scheduled for 10/23/2018  She insists she cannot wait until tomorrow  She would like an appointment today

## 2018-10-23 ENCOUNTER — TELEPHONE (OUTPATIENT)
Dept: INTERNAL MEDICINE CLINIC | Facility: CLINIC | Age: 83
End: 2018-10-23

## 2018-10-23 ENCOUNTER — TELEPHONE (OUTPATIENT)
Dept: UROLOGY | Facility: CLINIC | Age: 83
End: 2018-10-23

## 2018-10-23 ENCOUNTER — OFFICE VISIT (OUTPATIENT)
Dept: UROLOGY | Facility: CLINIC | Age: 83
End: 2018-10-23
Payer: MEDICARE

## 2018-10-23 VITALS
HEIGHT: 67 IN | WEIGHT: 155 LBS | BODY MASS INDEX: 24.33 KG/M2 | HEART RATE: 74 BPM | DIASTOLIC BLOOD PRESSURE: 70 MMHG | SYSTOLIC BLOOD PRESSURE: 146 MMHG

## 2018-10-23 DIAGNOSIS — N32.3 BLADDER DIVERTICULUM: ICD-10-CM

## 2018-10-23 DIAGNOSIS — N39.0 RECURRENT URINARY TRACT INFECTION: Primary | ICD-10-CM

## 2018-10-23 DIAGNOSIS — R33.9 URINARY RETENTION: ICD-10-CM

## 2018-10-23 PROCEDURE — 99204 OFFICE O/P NEW MOD 45 MIN: CPT | Performed by: UROLOGY

## 2018-10-23 NOTE — TELEPHONE ENCOUNTER
Patient managed by Rolin Lombard at the [facility] office  Patient wanted to know if she could take a medication that she wrote down on a piece of paper for Dr Lawson  Per Dr Lawson patient does not need to be taking any medications since she now has the adams catheter in  Patient verbalized understanding  Patient wants to know if she can get a sooner cysto appointment  Patient is aware that the only other office that Rolin Lombard goes to is the [facility] office  Can offer sooner cysto appointment is [facility] if available  Patient is aware that we are checking the scheduled and will call her back if we find a sooner opening

## 2018-10-23 NOTE — LETTER
October 23, 2018     Leonardo Galvin MD  [address] [facility] [address]    Patient: Obdulia Cardenas   YOB: 1935   Date of Visit: 10/23/2018       Dear Dr Meliton Leon:    Thank you for referring Marcos Mi to me for evaluation  Below are my notes for this consultation  If you have questions, please do not hesitate to call me  I look forward to following your patient along with you  Sincerely,        Lynette Buck MD        CC: No Recipients  Lynette Buck MD  10/23/2018  1:42 PM  Sign at close encounter  Referring Physician: Leonardo Galvin MD  A copy of this note was sent to the referring physician  Diagnoses and all orders for this visit:    Recurrent urinary tract infection    Urinary retention    Bladder diverticulum            Assessment and plan:       1  Urinary retention  -likely chronic and progressive with associated overflow incontinence and recurrent urinary tract infection  - likely multifactorial secondary to age and significant upper and lower spinal pathology    2  Bladder diverticulum noted on imaging    Patient has had a Valdez catheter placed 48 hr ago in the emergency department  I recommend continuing the Valdez catheter likely on a long-term basis  I suspect that her symptomatology of pelvic plain overflow incontinence and recurrent drug-resistant urinary tract infections will improve markedly with continued bladder drainage  We discussed that it is too early to tell if her bladder function will regroup in some ability though I suspect that this is going to be a long-term issue  More long-term options would include continuous intermittent catheterization which I do not think she would tolerate versus a suprapubic catheter which may be a good option for her  For today he was provided with a leg bag and Valdez catheter teaching by our nursing staff  She is more comfortable with this Valdez    We will exchanges every 4-6 weeks in the office and I will see her back for cystoscopy for further evaluation in the near future  Berry Mitchell MD      Chief Complaint     Urinary retention      History of Present Illness     Bubba Bocanegra is a 81 y o  female referred for an acute visit  Patient has had a progressive history of overflow incontinence and symptomatic urinary tract infections over the past 6 months  She has been treated with multiple rounds of antibiotics  At 1 point an external catheter device was applied for improved continence  He ultimately presented to the emergency department and was found to have high volume urinary retention with greater than 1 L in her bladder  A posterior bladder diverticulum was noted on imaging as well  She was discharged home and return to the ER again on Sunday  A Valdez catheter was appropriately placed  Pertinent comorbidities include hyperlipidemia and significant spinal pathology  He has had trouble with her lumbar and sacral spinal cord and underwent what sounds like an extensive multilevel posterior repair at an outside institution a few months ago  SHe denies any prior voiding dysfunction or prior history of UTIs  Detailed Urologic History     - please refer to HPI    Review of Systems     Review of Systems   Constitutional: Negative for activity change and fatigue  HENT: Negative for congestion  Eyes: Negative for visual disturbance  Respiratory: Negative for shortness of breath and wheezing  Cardiovascular: Negative for chest pain and leg swelling  Gastrointestinal: Negative for abdominal pain  Endocrine: Negative for polyuria  Genitourinary: Positive for difficulty urinating, dysuria, pelvic pain and vaginal pain  Negative for flank pain, hematuria and urgency  Musculoskeletal: Negative for back pain  Allergic/Immunologic: Negative for immunocompromised state  Neurological: Negative for dizziness and numbness     Psychiatric/Behavioral: Negative for dysphoric mood  All other systems reviewed and are negative  Allergies     Allergies   Allergen Reactions    Myrbetriq [Mirabegron] GI Intolerance and Hives    Latex Hives and Swelling    Penicillins        Physical Exam     Physical Exam   Constitutional: She is oriented to person, place, and time  She appears well-developed  HENT:   Head: Normocephalic and atraumatic  Eyes: Pupils are equal, round, and reactive to light  Neck: Normal range of motion  Cardiovascular:   Negative lower extremity edema   Pulmonary/Chest: Effort normal and breath sounds normal    Abdominal: Soft  Genitourinary:   Genitourinary Comments: Valdez catheter in place draining clear yellow urine connected to an overnight bag   Musculoskeletal: Normal range of motion  Neurological: She is alert and oriented to person, place, and time  Skin: Skin is warm  Psychiatric: She has a normal mood and affect             Vital Signs  Vitals:    10/23/18 1001   BP: 146/70   BP Location: Left arm   Patient Position: Sitting   Cuff Size: Adult   Pulse: 74   Weight: 70 3 kg (155 lb)   Height: 5' 7" (1 702 m)         Current Medications       Current Outpatient Prescriptions:     acetaminophen (TYLENOL) 325 mg tablet, Take 2 tablets (650 mg total) by mouth every 6 (six) hours as needed for mild pain, Disp: 30 tablet, Rfl: 0    atenolol (TENORMIN) 25 mg tablet, Take 1 tablet (25 mg total) by mouth daily, Disp: 90 tablet, Rfl: 1    atorvastatin (LIPITOR) 40 mg tablet, Take 1 tablet (40 mg total) by mouth daily, Disp: 90 tablet, Rfl: 3    Calcium Carbonate-Vitamin D3 600-400 MG-UNIT TABS, 1 tab daily, Disp: , Rfl:     dicyclomine (BENTYL) 20 mg tablet, Take 1 tablet (20 mg total) by mouth 2 (two) times a day as needed (abdominal spasm) for up to 5 days, Disp: 10 tablet, Rfl: 0    docusate sodium (COLACE) 100 mg capsule, Take 1 capsule (100 mg total) by mouth 2 (two) times a day Hold if having diarrhea, Disp: , Rfl: 0   levofloxacin (LEVAQUIN) 500 mg tablet, Take 1 tablet (500 mg total) by mouth every 24 hours for 10 days, Disp: 10 tablet, Rfl: 0    multivitamin (THERAGRAN) TABS, Take 1 tablet by mouth, Disp: , Rfl:       Active Problems     Patient Active Problem List   Diagnosis    Arthritis    Hypertension, essential    Combined hyperlipidemia    Pre-operative cardiovascular examination    Microscopic hematuria    Cardiac arrhythmia    Other impaction of intestine (HCC)    Anemia    Recurrent urinary tract infection         Past Medical History     Past Medical History:   Diagnosis Date    Alopecia     Cardiac arrhythmia     Compression fracture of L1 lumbar vertebra ([address] )     resolved 09/2014    Occlusion of carotid artery     unspecified laterality resolved 08/05/2016    Sebaceous cyst     UTI (urinary tract infection)          Surgical History     Past Surgical History:   Procedure Laterality Date    APPENDECTOMY      COLONOSCOPY      resolved 2009    ESOPHAGOGASTRODUODENOSCOPY      mild gastritis resolved 2009    THROMBOENDARTERECTOMY Left     carotid, resloved 12/2012    TONSILLECTOMY           Family History     Family History   Problem Relation Age of Onset    No Known Problems Mother          Social History     Social History     History   Smoking Status    Never Smoker   Smokeless Tobacco    Never Used         Pertinent Lab Values     Lab Results   Component Value Date    CREATININE 1 08 10/21/2018       No results found for: PSA    @RESULTRCNT(1H])@      Pertinent Imaging      - n/a    Portions of the record may have been created with voice recognition software   Occasional wrong word or "sound a like" substitutions may have occurred due to the inherent limitations of voice recognition software   Read the chart carefully and recognize, using context, where substitutions have occurred

## 2018-10-23 NOTE — TELEPHONE ENCOUNTER
PT WANTED TO LET THE DR KNOW THAT SHE IS HAVING HER CYSTO DONE WITH HER GYN DR CRUZ SHE WOULD NEED US TO FAX OVER THE INFORMATION REGARDING HER UTI SINCE THE DR IS [id number] HER [phone]

## 2018-10-23 NOTE — ED PROVIDER NOTES
History  Chief Complaint   Patient presents with    Possible UTI     seen 2 days ago for this (given ABX for infection) but pain worsened today, left hip abdomen into left flank     HPI   59-year-old female returns to the ED 3 days after initial ED visit with abdominal pain and urinary complaints  Patient was seen on 10/18 with complaints of alternating urinary retention and incontinence with associated lower abdominal discomfort in the setting of UTI  Prior to that visit, she was prescribed Macrobid and Keflex by her PCP, which she thought were making her symptoms worse  During ED visit, she was offered placement of adams catheter, but declined  Since that time, urine culture resulted and previous antibiotics were discontinued to be replaced with Levaquin  Patient reports compliance with antibiotics, but has continued to have alternating urinary retention and incontinence, as well as increasing lower abdominal discomfort  She denies any associated fever, chills, chest pain, shortness of breath, upper abdominal pain, flank pain, nausea, vomiting, hematuria, change in bowel function, or complaints other than stated above  Prior to Admission Medications   Prescriptions Last Dose Informant Patient Reported? Taking?    Calcium Carbonate-Vitamin D3 600-400 MG-UNIT TABS  Self Yes No   Si tab daily   acetaminophen (TYLENOL) 325 mg tablet  Self No No   Sig: Take 2 tablets (650 mg total) by mouth every 6 (six) hours as needed for mild pain   atenolol (TENORMIN) 25 mg tablet  Self No No   Sig: Take 1 tablet (25 mg total) by mouth daily   atorvastatin (LIPITOR) 40 mg tablet  Self No No   Sig: Take 1 tablet (40 mg total) by mouth daily   dicyclomine (BENTYL) 20 mg tablet  Self No No   Sig: Take 1 tablet (20 mg total) by mouth 2 (two) times a day as needed (abdominal spasm) for up to 5 days   docusate sodium (COLACE) 100 mg capsule  Self No No   Sig: Take 1 capsule (100 mg total) by mouth 2 (two) times a day Hold if having diarrhea   levofloxacin (LEVAQUIN) 500 mg tablet  Self No No   Sig: Take 1 tablet (500 mg total) by mouth every 24 hours for 10 days   multivitamin (THERAGRAN) TABS  Self Yes No   Sig: Take 1 tablet by mouth      Facility-Administered Medications: None       Past Medical History:   Diagnosis Date    Alopecia     Cardiac arrhythmia     Compression fracture of L1 lumbar vertebra ([address] )     resolved 09/2014    Occlusion of carotid artery     unspecified laterality resolved 08/05/2016    Sebaceous cyst     UTI (urinary tract infection)        Past Surgical History:   Procedure Laterality Date    APPENDECTOMY      COLONOSCOPY      resolved 2009    ESOPHAGOGASTRODUODENOSCOPY      mild gastritis resolved 2009    THROMBOENDARTERECTOMY Left     carotid, resloved 12/2012    TONSILLECTOMY         Family History   Problem Relation Age of Onset    No Known Problems Mother      I have reviewed and agree with the history as documented  Social History   Substance Use Topics    Smoking status: Never Smoker    Smokeless tobacco: Never Used    Alcohol use Yes      Comment: rarely (history)         Review of Systems   Constitutional: Negative for chills and fever  Respiratory: Negative for shortness of breath  Gastrointestinal: Positive for abdominal pain  Negative for nausea and vomiting  Genitourinary: Positive for difficulty urinating and urgency  Musculoskeletal: Negative for arthralgias and joint swelling  Skin: Negative for rash and wound  Allergic/Immunologic: Negative for immunocompromised state  Neurological: Negative for headaches  Psychiatric/Behavioral: The patient is nervous/anxious  Physical Exam  Physical Exam   Constitutional: She is oriented to person, place, and time  She appears well-nourished  No distress  HENT:   Head: Normocephalic and atraumatic  Eyes: EOM are normal    Neck: Normal range of motion  Neck supple     Cardiovascular: Normal rate and regular rhythm  Pulmonary/Chest: Effort normal and breath sounds normal  No respiratory distress  Abdominal: Soft  She exhibits no distension  There is tenderness in the suprapubic area  There is no CVA tenderness  Musculoskeletal: Normal range of motion  Neurological: She is alert and oriented to person, place, and time  Skin: Skin is warm and dry  She is not diaphoretic  Psychiatric: Her behavior is normal  Her mood appears anxious  Nursing note and vitals reviewed        Vital Signs  ED Triage Vitals [10/21/18 1348]   Temperature Pulse Respirations Blood Pressure SpO2   97 6 °F (36 4 °C) 72 16 (!) 181/77 97 %      Temp Source Heart Rate Source Patient Position - Orthostatic VS BP Location FiO2 (%)   Oral Monitor Sitting Right arm --      Pain Score       8           Vitals:    10/21/18 1808 10/21/18 1815 10/21/18 1830 10/21/18 1900   BP: 159/83 159/83 127/61 144/60   Pulse: 77 84 77 78   Patient Position - Orthostatic VS: Lying Sitting Sitting Sitting       Visual Acuity      ED Medications  Medications   iohexol (OMNIPAQUE) 350 MG/ML injection (MULTI-DOSE) 100 mL (100 mL Intravenous Given 10/21/18 1738)       Diagnostic Studies  Results Reviewed     Procedure Component Value Units Date/Time    Urine culture [[id number]] Collected:  10/21/18 1543    Lab Status:  Final result Specimen:  Urine Updated:  10/22/18 1743     Urine Culture No Growth <1000 cfu/mL    Basic metabolic panel [[id number]]  (Abnormal) Collected:  10/21/18 1501    Lab Status:  Final result Specimen:  Blood from Arm, Right Updated:  10/21/18 1520     Sodium 134 (L) mmol/L      Potassium 4 4 mmol/L      Chloride 100 mmol/L      CO2 27 mmol/L      ANION GAP 7 mmol/L      BUN 17 mg/dL      Creatinine 1 08 mg/dL      Glucose 107 mg/dL      Calcium 10 1 mg/dL      eGFR 48 ml/min/1 73sq m     Narrative:         National Kidney Disease Education Program recommendations are as follows:  GFR calculation is accurate only with a steady state creatinine  Chronic Kidney disease less than 60 ml/min/1 73 sq  meters  Kidney failure less than 15 ml/min/1 73 sq  meters  CBC and differential [[id number]]  (Abnormal) Collected:  10/21/18 1501    Lab Status:  Final result Specimen:  Blood from Arm, Right Updated:  10/21/18 1510     WBC 4 12 (L) Thousand/uL      RBC 3 78 (L) Million/uL      Hemoglobin 11 5 g/dL      Hematocrit 34 9 %      MCV 92 fL      MCH 30 4 pg      MCHC 33 0 g/dL      RDW 13 7 %      MPV 10 3 fL      Platelets 974 Thousands/uL      nRBC 0 /100 WBCs      Neutrophils Relative 68 %      Immat GRANS % 0 %      Lymphocytes Relative 19 %      Monocytes Relative 11 %      Eosinophils Relative 2 %      Basophils Relative 0 %      Neutrophils Absolute 2 80 Thousands/µL      Immature Grans Absolute 0 01 Thousand/uL      Lymphocytes Absolute 0 79 Thousands/µL      Monocytes Absolute 0 45 Thousand/µL      Eosinophils Absolute 0 06 Thousand/µL      Basophils Absolute 0 01 Thousands/µL     Urine Microscopic [[id number]]  (Abnormal) Collected:  10/21/18 1438    Lab Status:  Final result Specimen:  Urine from Urine, Clean Catch Updated:  10/21/18 1451     RBC, UA None Seen /hpf      WBC, UA 2-4 (A) /hpf      Epithelial Cells Occasional /hpf      Bacteria, UA Occasional /hpf     UA w Reflex to Microscopic w Reflex to Culture [[id number]]  (Abnormal) Collected:  10/21/18 1438    Lab Status:  Final result Specimen:  Urine from Urine, Clean Catch Updated:  10/21/18 1445     Color, UA Yellow     Clarity, UA Clear     Specific Gravity, UA 1 015     pH, UA 6 5     Leukocytes, UA Small (A)     Nitrite, UA Negative     Protein, UA Negative mg/dl      Glucose, UA Negative mg/dl      Ketones, UA Negative mg/dl      Urobilinogen, UA 0 2 E U /dl      Bilirubin, UA Negative     Blood, UA Negative                 CT abdomen pelvis with contrast   Final Result by Asiya Middleton DO (10/21 1808)      No acute intra-abdominal abnormality        Question mild prominence of the endometrium at 6 mm  Correlation with nonemergent pelvic ultrasound recommended  4 cm posterior bladder diverticulum  Workstation performed: [id number]                    Procedures  Procedures       Phone Contacts  ED Phone Contact    ED Course  ED Course as of Oct 26 0020   Jeri Brina Oct 21, 2018   1506 Bacteria, UA: Occasional   1536 Pain resolved completely since urinating    1823 Urology paged            Identification of Seniors at Risk      Most Recent Value   (ISAR) Identification of Seniors at Risk   Before the illness or injury that brought you to the Emergency, did you need someone to help you on a regular basis? 0 Filed at: 10/21/2018 1351   In the last 24 hours, have you needed more help than usual?  0 Filed at: 10/21/2018 1351   Have you been hospitalized for one or more nights during the past 6 months? 1 Filed at: 10/21/2018 1351   In general, do you see well?  0 Filed at: 10/21/2018 1351   In general, do you have serious problems with your memory? 0 Filed at: 10/21/2018 1351   Do you take more than three different medications every day? 1 Filed at: 10/21/2018 1351   ISAR Score  2 Filed at: 10/21/2018 1351                          MDM  Number of Diagnoses or Management Options  Bladder diverticulum:   Urinary incontinence:   Urinary retention:   UTI (urinary tract infection):   Diagnosis management comments: 56-year-old female with continued urinary retention/incontinence and abdominal discomfort in the setting of UTI despite antibiotic treatment  Will check labs to assess for possible kidney injury, CTAP to assess for other etiology of urinary sx, as infection should be improving  Discussed with on call urologist, Dr Murtaza Marion, following return of all results  Recommended placement of adams catheter, outpatient follow up  Patient agrees with adams, though declines leg bag and prefers to keep bed bag attached    Educated by nurse and tech on adams care, discharged with extra leg bags   Will arrange outpatient follow up with [facility] in the next week  Patient aware of incidentally noted thickened endometrium and states that she has undergone recent transvaginal ultrasound  She will follow up with OBGYN with any questions/concerns  CritCare Time    Disposition  Final diagnoses:   UTI (urinary tract infection)   Bladder diverticulum   Urinary retention   Urinary incontinence     Time reflects when diagnosis was documented in both MDM as applicable and the Disposition within this note     Time User Action Codes Description Comment    10/21/2018  7:03 PM Kasia Torres Add [N39 0] UTI (urinary tract infection)     10/21/2018  7:04 PM Kasia Torres Add [N32 3] Bladder diverticulum     10/21/2018  7:04 PM Kasia Torres Add [R33 9] Urinary retention     10/21/2018  7:04 PM Marquis Ndiaye Add [R32] Urinary incontinence       ED Disposition     ED Disposition Condition Comment    Discharge  Tacos Davila discharge to home/self care      Condition at discharge: Good        Follow-up Information     Follow up With Specialties Details Why [address] [facility] Schedule an appointment as soon as possible for a visit Dr Dennise Coyle [address] [facility] [phone]  0  [facility] [address]  [address] [place] [address], [phone]          Discharge Medication List as of 10/21/2018  7:05 PM      CONTINUE these medications which have NOT CHANGED    Details   acetaminophen (TYLENOL) 325 mg tablet Take 2 tablets (650 mg total) by mouth every 6 (six) hours as needed for mild pain, Starting Fri 4/27/2018, No Print      atenolol (TENORMIN) 25 mg tablet Take 1 tablet (25 mg total) by mouth daily, Starting Thu 6/7/2018, Normal      atorvastatin (LIPITOR) 40 mg tablet Take 1 tablet (40 mg total) by mouth daily, Starting Wed 8/29/2018, Normal      Calcium Carbonate-Vitamin D3 600-400 MG-UNIT TABS 1 tab daily, Historical Med      dicyclomine (BENTYL) 20 mg tablet Take 1 tablet (20 mg total) by mouth 2 (two) times a day as needed (abdominal spasm) for up to 5 days, Starting Thu 10/18/2018, Until Tue 10/23/2018, Print      docusate sodium (COLACE) 100 mg capsule Take 1 capsule (100 mg total) by mouth 2 (two) times a day Hold if having diarrhea, Starting Fri 4/27/2018, No Print      levofloxacin (LEVAQUIN) 500 mg tablet Take 1 tablet (500 mg total) by mouth every 24 hours for 10 days, Starting Sat 10/20/2018, Until Tue 10/30/2018, Normal      multivitamin (THERAGRAN) TABS Take 1 tablet by mouth, Historical Med           No discharge procedures on file      ED Provider  Electronically Signed by           Kayden Peña MD  10/26/18 0822

## 2018-10-23 NOTE — TELEPHONE ENCOUNTER
See other telephone note, Ceasar Olivia RN is also trying to contact patient to schedule adams change  We will wait to hear back

## 2018-10-23 NOTE — TELEPHONE ENCOUNTER
As stated in the below message patient can be scheduled in the next available cysto appointment at [facility] or can keep scheduled appointment in January for cysto

## 2018-10-23 NOTE — PROGRESS NOTES
Referring Physician: Xuan Cotton MD  A copy of this note was sent to the referring physician  Diagnoses and all orders for this visit:    Recurrent urinary tract infection    Urinary retention    Bladder diverticulum            Assessment and plan:       1  Urinary retention  -likely chronic and progressive with associated overflow incontinence and recurrent urinary tract infection  - likely multifactorial secondary to age and significant upper and lower spinal pathology    2  Bladder diverticulum noted on imaging    Patient has had a Valdez catheter placed 48 hr ago in the emergency department  I recommend continuing the Valdez catheter likely on a long-term basis  I suspect that her symptomatology of pelvic plain overflow incontinence and recurrent drug-resistant urinary tract infections will improve markedly with continued bladder drainage  We discussed that it is too early to tell if her bladder function will regroup in some ability though I suspect that this is going to be a long-term issue  More long-term options would include continuous intermittent catheterization which I do not think she would tolerate versus a suprapubic catheter which may be a good option for her  For today he was provided with a leg bag and Valdez catheter teaching by our nursing staff  She is more comfortable with this Valdez  We will exchanges every 4-6 weeks in the office and I will see her back for cystoscopy for further evaluation in the near future  Kevin Brady MD      Chief Complaint     Urinary retention      History of Present Illness     Yunier Coyle is a 81 y o  female referred for an acute visit  Patient has had a progressive history of overflow incontinence and symptomatic urinary tract infections over the past 6 months  She has been treated with multiple rounds of antibiotics  At 1 point an external catheter device was applied for improved continence      He ultimately presented to the emergency department and was found to have high volume urinary retention with greater than 1 L in her bladder  A posterior bladder diverticulum was noted on imaging as well  She was discharged home and return to the ER again on Sunday  A Valdez catheter was appropriately placed  Pertinent comorbidities include hyperlipidemia and significant spinal pathology  He has had trouble with her lumbar and sacral spinal cord and underwent what sounds like an extensive multilevel posterior repair at an outside institution a few months ago  SHe denies any prior voiding dysfunction or prior history of UTIs  Detailed Urologic History     - please refer to HPI    Review of Systems     Review of Systems   Constitutional: Negative for activity change and fatigue  HENT: Negative for congestion  Eyes: Negative for visual disturbance  Respiratory: Negative for shortness of breath and wheezing  Cardiovascular: Negative for chest pain and leg swelling  Gastrointestinal: Negative for abdominal pain  Endocrine: Negative for polyuria  Genitourinary: Positive for difficulty urinating, dysuria, pelvic pain and vaginal pain  Negative for flank pain, hematuria and urgency  Musculoskeletal: Negative for back pain  Allergic/Immunologic: Negative for immunocompromised state  Neurological: Negative for dizziness and numbness  Psychiatric/Behavioral: Negative for dysphoric mood  All other systems reviewed and are negative  Allergies     Allergies   Allergen Reactions    Myrbetriq [Mirabegron] GI Intolerance and Hives    Latex Hives and Swelling    Penicillins        Physical Exam     Physical Exam   Constitutional: She is oriented to person, place, and time  She appears well-developed  HENT:   Head: Normocephalic and atraumatic  Eyes: Pupils are equal, round, and reactive to light  Neck: Normal range of motion     Cardiovascular:   Negative lower extremity edema   Pulmonary/Chest: Effort normal and breath sounds normal    Abdominal: Soft  Genitourinary:   Genitourinary Comments: Valdez catheter in place draining clear yellow urine connected to an overnight bag   Musculoskeletal: Normal range of motion  Neurological: She is alert and oriented to person, place, and time  Skin: Skin is warm  Psychiatric: She has a normal mood and affect             Vital Signs  Vitals:    10/23/18 1001   BP: 146/70   BP Location: Left arm   Patient Position: Sitting   Cuff Size: Adult   Pulse: 74   Weight: 70 3 kg (155 lb)   Height: 5' 7" (1 702 m)         Current Medications       Current Outpatient Prescriptions:     acetaminophen (TYLENOL) 325 mg tablet, Take 2 tablets (650 mg total) by mouth every 6 (six) hours as needed for mild pain, Disp: 30 tablet, Rfl: 0    atenolol (TENORMIN) 25 mg tablet, Take 1 tablet (25 mg total) by mouth daily, Disp: 90 tablet, Rfl: 1    atorvastatin (LIPITOR) 40 mg tablet, Take 1 tablet (40 mg total) by mouth daily, Disp: 90 tablet, Rfl: 3    Calcium Carbonate-Vitamin D3 600-400 MG-UNIT TABS, 1 tab daily, Disp: , Rfl:     dicyclomine (BENTYL) 20 mg tablet, Take 1 tablet (20 mg total) by mouth 2 (two) times a day as needed (abdominal spasm) for up to 5 days, Disp: 10 tablet, Rfl: 0    docusate sodium (COLACE) 100 mg capsule, Take 1 capsule (100 mg total) by mouth 2 (two) times a day Hold if having diarrhea, Disp: , Rfl: 0    levofloxacin (LEVAQUIN) 500 mg tablet, Take 1 tablet (500 mg total) by mouth every 24 hours for 10 days, Disp: 10 tablet, Rfl: 0    multivitamin (THERAGRAN) TABS, Take 1 tablet by mouth, Disp: , Rfl:       Active Problems     Patient Active Problem List   Diagnosis    Arthritis    Hypertension, essential    Combined hyperlipidemia    Pre-operative cardiovascular examination    Microscopic hematuria    Cardiac arrhythmia    Other impaction of intestine (HCC)    Anemia    Recurrent urinary tract infection         Past Medical History     Past Medical History:   Diagnosis Date    Alopecia     Cardiac arrhythmia     Compression fracture of L1 lumbar vertebra (HCC)     resolved 09/2014    Occlusion of carotid artery     unspecified laterality resolved 08/05/2016    Sebaceous cyst     UTI (urinary tract infection)          Surgical History     Past Surgical History:   Procedure Laterality Date    APPENDECTOMY      COLONOSCOPY      resolved 2009    ESOPHAGOGASTRODUODENOSCOPY      mild gastritis resolved 2009    THROMBOENDARTERECTOMY Left     carotid, resloved 12/2012    TONSILLECTOMY           Family History     Family History   Problem Relation Age of Onset    No Known Problems Mother          Social History     Social History     History   Smoking Status    Never Smoker   Smokeless Tobacco    Never Used         Pertinent Lab Values     Lab Results   Component Value Date    CREATININE 1 08 10/21/2018       No results found for: PSA    @RESULTRCNT(1H])@      Pertinent Imaging      - n/a    Portions of the record may have been created with voice recognition software   Occasional wrong word or "sound a like" substitutions may have occurred due to the inherent limitations of voice recognition software   Read the chart carefully and recognize, using context, where substitutions have occurred

## 2018-10-23 NOTE — TELEPHONE ENCOUNTER
Answering service 10/21/2018 12:34pm   Pt was in ER on 10/17 and now has uti and pain in vaginal area and now has pain up to her waist

## 2018-10-24 ENCOUNTER — TELEPHONE (OUTPATIENT)
Dept: UROLOGY | Facility: CLINIC | Age: 83
End: 2018-10-24

## 2018-10-24 ENCOUNTER — PROCEDURE VISIT (OUTPATIENT)
Dept: UROLOGY | Facility: CLINIC | Age: 83
End: 2018-10-24
Payer: MEDICARE

## 2018-10-24 VITALS
SYSTOLIC BLOOD PRESSURE: 146 MMHG | DIASTOLIC BLOOD PRESSURE: 76 MMHG | HEART RATE: 78 BPM | BODY MASS INDEX: 23.83 KG/M2 | WEIGHT: 151.8 LBS | HEIGHT: 67 IN

## 2018-10-24 DIAGNOSIS — R33.9 URINARY RETENTION: ICD-10-CM

## 2018-10-24 PROCEDURE — 99211 OFF/OP EST MAY X REQ PHY/QHP: CPT | Performed by: UROLOGY

## 2018-10-24 NOTE — PROGRESS NOTES
10/24/2018  Issa Renee is a 81 y o  female  [id number]    Diagnosis:  Chief Complaint     Urinary Retention          Patient presents for adams catheter check managed by Julien Santos    Plan:  Follow up for next adams change as scheduled today for 11/23/18 at 1:30 at the [facility] office with diagnostic nurse  Patient instructed to call with any questions or concerns in the meantime  Vitals:    10/24/18 1049   BP: 146/76   BP Location: Right arm   Patient Position: Sitting   Cuff Size: Standard   Pulse: 78   Weight: 68 9 kg (151 lb 12 8 oz)   Height: 5' 7" (1 702 m)       Procedures  Patient called and left message on nurse line stating that she thinks that the catheter is coming out and that she is soaked in urine  Had patient come in today at 10:30 for a adams check to made sure catheter is in place  Patient came in as scheduled with nurse  Patient did not have adams catheter attached to leg bag  Attached catheter to leg bag and had urine started draining into bag  Reviewed with patient again that adams catheter needs to be attached to a bag  Reviewed with patient again how to  and reattach leg bags  Patient then preceded to be confused that tube is coming out of her  Explained to patient that catheter tube goes from her bladder to the bag  Reviewed with patient catheter, catheter care, and bladder spasm and leaking  Patient verbalized understanding and denies any other questions         Javi Bravo RN

## 2018-10-24 NOTE — TELEPHONE ENCOUNTER
Patient managed by Carlos Leon at the [facility] office  Patient reports that the catheter moved last night and she is soaking herself and she thinks that the catheter is coming out  Patient wanted to know if she could pull it out  Instructed the patient NOT to pull out catheter because she can cause damage to her urethra doing that  Patient verbalized understand that she cant pull out catheter  Patient was scheduled for nurse visit to check catheter today at 10:30am  Patient states that she wanted to see the doctor  Patient was made aware that this visit can be scheduled with nurse and there are no doctors in the office today  Patient was made aware that we do not have doctors in the [facility] office every day  Patient verbalized understanding and accepted appointment with nurse today for adams check at 10:30

## 2018-10-25 ENCOUNTER — OFFICE VISIT (OUTPATIENT)
Dept: DERMATOLOGY | Facility: CLINIC | Age: 83
End: 2018-10-25
Payer: MEDICARE

## 2018-10-25 DIAGNOSIS — L21.9 SEBORRHEIC DERMATITIS: Primary | ICD-10-CM

## 2018-10-25 PROCEDURE — 99212 OFFICE O/P EST SF 10 MIN: CPT | Performed by: DERMATOLOGY

## 2018-10-25 RX ORDER — KETOCONAZOLE 20 MG/G
CREAM TOPICAL DAILY
Qty: 30 G | Refills: 3 | Status: SHIPPED | OUTPATIENT
Start: 2018-10-25 | End: 2019-09-16

## 2018-10-25 NOTE — PATIENT INSTRUCTIONS
Seborrheic dermatitis again discussed the chronic nature of this process advised patient to use the topical cream we had prescribed twice a day for 2-3 weeks when it is active and retreat it as it recurrs TRANSFER - OUT REPORT:    Verbal report given to Elaine Mancilla on Toño Stinson  being transferred to  for routine post - op       Report consisted of patients Situation, Background, Assessment and   Recommendations(SBAR). Information from the following report(s) SBAR, OR Summary, Procedure Summary, Intake/Output and MAR was reviewed with the receiving nurse. Lines:   Peripheral IV 07/27/18 Hand (Active)   Site Assessment Clean, dry, & intact 7/27/2018 10:53 AM   Phlebitis Assessment 0 7/27/2018 10:53 AM   Infiltration Assessment 0 7/27/2018 10:53 AM   Dressing Status Clean, dry, & intact; Occlusive 7/27/2018 10:53 AM   Dressing Type Transparent;Tape 7/27/2018 10:53 AM   Hub Color/Line Status Pink; Infusing 7/27/2018 10:53 AM   Alcohol Cap Used No 7/27/2018 10:53 AM       Peripheral IV 07/27/18 Left Hand (Active)   Site Assessment Clean, dry, & intact 7/27/2018 10:53 AM   Phlebitis Assessment 0 7/27/2018 10:53 AM   Infiltration Assessment 0 7/27/2018 10:53 AM   Dressing Status Clean, dry, & intact; Occlusive 7/27/2018 10:53 AM   Dressing Type Transparent;Tape 7/27/2018 10:53 AM   Hub Color/Line Status Pink;Capped 7/27/2018 10:53 AM   Alcohol Cap Used No 7/27/2018 10:53 AM        Opportunity for questions and clarification was provided. Patient transported with:   O2 @ 2 liters    VTE prophylaxis orders have been written for Toño Stinson. Patient and family given floor number and nurses name. Family updated re: pt status after security code verified.

## 2018-10-25 NOTE — PROGRESS NOTES
[facility]  [address] [place] [address]  [phone]  [phone]     MRN: [id number] : 1935  Encounter: [id number]  Patient Information: Jairon Slice  Chief complaint:  Scaling on the nose    History of present illness:  81-year-old female who we had seen last year for overall checkup presents for concerns regarding this same process patient does not recall being here for they treatment of that and rash does not recall the cream we had given her  Past Medical History:   Diagnosis Date    Alopecia     Cardiac arrhythmia     Compression fracture of L1 lumbar vertebra ([address] )     resolved 2014    Occlusion of carotid artery     unspecified laterality resolved 2016    Sebaceous cyst     UTI (urinary tract infection)      Past Surgical History:   Procedure Laterality Date    APPENDECTOMY      COLONOSCOPY      resolved     ESOPHAGOGASTRODUODENOSCOPY      mild gastritis resolved     THROMBOENDARTERECTOMY Left     carotid, resloved 2012    TONSILLECTOMY       Social History   History   Alcohol Use    Yes     Comment: rarely (history)      History   Drug Use No     History   Smoking Status    Never Smoker   Smokeless Tobacco    Never Used     Family History   Problem Relation Age of Onset    No Known Problems Mother      Meds/Allergies   Allergies   Allergen Reactions    Myrbetriq [Mirabegron] GI Intolerance and Hives    Latex Hives and Swelling    Penicillins        Meds:  Prior to Admission medications    Medication Sig Start Date End Date Taking?  Authorizing Provider   acetaminophen (TYLENOL) 325 mg tablet Take 2 tablets (650 mg total) by mouth every 6 (six) hours as needed for mild pain 18  Yes Jayy Jorge DO   atenolol (TENORMIN) 25 mg tablet Take 1 tablet (25 mg total) by mouth daily 18  Yes Crissie Habermann, MD   atorvastatin (LIPITOR) 40 mg tablet Take 1 tablet (40 mg total) by mouth daily 8/29/18  Yes Karol Jasso MD   Calcium Carbonate-Vitamin D3 600-400 MG-UNIT TABS 1 tab daily   Yes Historical Provider, MD   docusate sodium (COLACE) 100 mg capsule Take 1 capsule (100 mg total) by mouth 2 (two) times a day Hold if having diarrhea 4/27/18  Yes Geovanny Nicholas DO   levofloxacin (LEVAQUIN) 500 mg tablet Take 1 tablet (500 mg total) by mouth every 24 hours for 10 days 10/20/18 10/30/18 Yes Karol Jasso MD   multivitamin [facility]) TABS Take 1 tablet by mouth   Yes Historical Provider, MD   dicyclomine (BENTYL) 20 mg tablet Take 1 tablet (20 mg total) by mouth 2 (two) times a day as needed (abdominal spasm) for up to 5 days 10/18/18 10/23/18  Ariana Orellana MD       Subjective:     Review of Systems:    General: negative for - chills, fatigue, fever,  weight gain or weight loss  Psychological: negative for - anxiety, behavioral disorder, concentration difficulties, decreased libido, depression, irritability, memory difficulties, mood swings, sleep disturbances or suicidal ideation  ENT: negative for - hearing difficulties , nasal congestion, nasal discharge, oral lesions, sinus pain, sneezing, sore throat  Allergy and Immunology: negative for - hives, insect bite sensitivity,  Hematological and Lymphatic: negative for - bleeding problems, blood clots,bruising, swollen lymph nodes  Endocrine: negative for - hair pattern changes, hot flashes, malaise/lethargy, mood swings, palpitations, polydipsia/polyuria, skin changes, temperature intolerance or unexpected weight change  Respiratory: negative for - cough, hemoptysis, orthopnea, shortness of breath, or wheezing  Cardiovascular: negative for - chest pain, dyspnea on exertion, edema,  Gastrointestinal: negative for - abdominal pain, nausea/vomiting  Genito-Urinary: negative for - dysuria, incontinence, irregular/heavy menses or urinary frequency/urgency  Musculoskeletal: negative for - gait disturbance, joint pain, joint stiffness, joint swelling, muscle pain, muscular weakness  Dermatological:  As in HPI  Neurological: negative for confusion, dizziness, headaches, impaired coordination/balance, memory loss, numbness/tingling, seizures, speech problems, tremors or weakness       Objective: There were no vitals taken for this visit  Physical Exam:    General Appearance:    Alert, cooperative, no distress   Head:    Normocephalic, without obvious abnormality, atraumatic           Skin:   A full skin exam was performed including scalp, head scalp, eyes, ears, nose, lips, neck, chest, axilla, abdomen, back, buttocks, bilateral upper extremities, bilateral lower extremities, hands, feet, fingers, toes, fingernails, and toenails scaling noted minimally present on her nose and nasal creases     Assessment:     1  Seborrheic dermatitis           Plan:   Seborrheic dermatitis again discussed the chronic nature of this process advised patient to use the topical cream we had prescribed twice a day for 2-3 weeks when it is active and retreat it as it recurrs    Ricardo Flores MD  10/25/2018,12:13 PM    Portions of the record may have been created with voice recognition software   Occasional wrong word or "sound a like" substitutions may have occurred due to the inherent limitations of voice recognition software   Read the chart carefully and recognize, using context, where substitutions have occurred

## 2018-10-27 PROBLEM — N81.10 BLADDER PROLAPSE, FEMALE, ACQUIRED: Status: ACTIVE | Noted: 2018-10-27

## 2018-12-13 DIAGNOSIS — I10 HYPERTENSION, UNSPECIFIED TYPE: ICD-10-CM

## 2018-12-13 RX ORDER — ATENOLOL 25 MG/1
25 TABLET ORAL DAILY
Qty: 90 TABLET | Refills: 1 | Status: SHIPPED | OUTPATIENT
Start: 2018-12-13 | End: 2019-06-12 | Stop reason: SDUPTHER

## 2019-01-22 ENCOUNTER — TELEPHONE (OUTPATIENT)
Dept: INTERNAL MEDICINE CLINIC | Facility: CLINIC | Age: 84
End: 2019-01-22

## 2019-01-22 NOTE — TELEPHONE ENCOUNTER
CALLED PT   AND SHE JUST WANTS TO KNOW HOW TO TAKE THE EXTRA STRENGTH TYLENOL, HAS BEEN TAKING IT FOR 12 DAYS AND WONDERING IF SHE SHOULD STOP TAKING FOR A FEW DAYS AND THEN RESTART, OR JUST CONTINUE TAKING-PLEASE ADVISE

## 2019-01-22 NOTE — TELEPHONE ENCOUNTER
Had spine surgery in 2018, has a problem with pain  She is taking tylenol and would like to know how to take it  Dr Christine Gonzales at [facility]  suggest to talk to Dr Betsy Toledo about the pain and extra strength tylenol  Please call pt at [phone]

## 2019-01-22 NOTE — TELEPHONE ENCOUNTER
The maximum daily dose of Tylenol is 3000 mg    So, she should take the 500 mg 2 tablets every 8 hours

## 2019-01-22 NOTE — TELEPHONE ENCOUNTER
Please tell the patient that I think that the surgeon who operated on her should do the pain management

## 2019-02-11 ENCOUNTER — HOSPITAL ENCOUNTER (EMERGENCY)
Facility: HOSPITAL | Age: 84
Discharge: HOME | End: 2019-02-11
Attending: EMERGENCY MEDICINE
Payer: MEDICARE

## 2019-02-11 ENCOUNTER — TELEPHONE (OUTPATIENT)
Dept: INTERNAL MEDICINE CLINIC | Facility: CLINIC | Age: 84
End: 2019-02-11

## 2019-02-11 ENCOUNTER — APPOINTMENT (EMERGENCY)
Dept: RADIOLOGY | Facility: HOSPITAL | Age: 84
End: 2019-02-11
Payer: MEDICARE

## 2019-02-11 VITALS
HEART RATE: 64 BPM | TEMPERATURE: 97.5 F | DIASTOLIC BLOOD PRESSURE: 56 MMHG | WEIGHT: 154.54 LBS | OXYGEN SATURATION: 98 % | BODY MASS INDEX: 24.2 KG/M2 | SYSTOLIC BLOOD PRESSURE: 109 MMHG | RESPIRATION RATE: 12 BRPM

## 2019-02-11 DIAGNOSIS — R07.9 CHEST PAIN, UNSPECIFIED TYPE: Primary | ICD-10-CM

## 2019-02-11 LAB
ALBUMIN SERPL BCP-MCNC: 3.6 G/DL (ref 3.5–5)
ALP SERPL-CCNC: 112 U/L (ref 46–116)
ALT SERPL W P-5'-P-CCNC: 17 U/L (ref 12–78)
ANION GAP SERPL CALCULATED.3IONS-SCNC: 7 MMOL/L (ref 4–13)
AST SERPL W P-5'-P-CCNC: 28 U/L (ref 5–45)
BASOPHILS # BLD AUTO: 0.03 THOUSANDS/ΜL (ref 0–0.1)
BASOPHILS NFR BLD AUTO: 1 % (ref 0–1)
BILIRUB SERPL-MCNC: 0.2 MG/DL (ref 0.2–1)
BUN SERPL-MCNC: 22 MG/DL (ref 5–25)
CALCIUM SERPL-MCNC: 10.4 MG/DL (ref 8.3–10.1)
CHLORIDE SERPL-SCNC: 105 MMOL/L (ref 100–108)
CO2 SERPL-SCNC: 29 MMOL/L (ref 21–32)
CREAT SERPL-MCNC: 0.99 MG/DL (ref 0.6–1.3)
EOSINOPHIL # BLD AUTO: 0.1 THOUSAND/ΜL (ref 0–0.61)
EOSINOPHIL NFR BLD AUTO: 3 % (ref 0–6)
ERYTHROCYTE [DISTWIDTH] IN BLOOD BY AUTOMATED COUNT: 13.2 % (ref 11.6–15.1)
GFR SERPL CREATININE-BSD FRML MDRD: 53 ML/MIN/1.73SQ M
GLUCOSE SERPL-MCNC: 92 MG/DL (ref 65–140)
HCT VFR BLD AUTO: 36.3 % (ref 34.8–46.1)
HGB BLD-MCNC: 11.7 G/DL (ref 11.5–15.4)
IMM GRANULOCYTES # BLD AUTO: 0.01 THOUSAND/UL (ref 0–0.2)
IMM GRANULOCYTES NFR BLD AUTO: 0 % (ref 0–2)
LYMPHOCYTES # BLD AUTO: 1.04 THOUSANDS/ΜL (ref 0.6–4.47)
LYMPHOCYTES NFR BLD AUTO: 28 % (ref 14–44)
MCH RBC QN AUTO: 30.4 PG (ref 26.8–34.3)
MCHC RBC AUTO-ENTMCNC: 32.2 G/DL (ref 31.4–37.4)
MCV RBC AUTO: 94 FL (ref 82–98)
MONOCYTES # BLD AUTO: 0.33 THOUSAND/ΜL (ref 0.17–1.22)
MONOCYTES NFR BLD AUTO: 9 % (ref 4–12)
NEUTROPHILS # BLD AUTO: 2.16 THOUSANDS/ΜL (ref 1.85–7.62)
NEUTS SEG NFR BLD AUTO: 59 % (ref 43–75)
NRBC BLD AUTO-RTO: 0 /100 WBCS
PLATELET # BLD AUTO: 152 THOUSANDS/UL (ref 149–390)
PMV BLD AUTO: 9.9 FL (ref 8.9–12.7)
POTASSIUM SERPL-SCNC: 4.2 MMOL/L (ref 3.5–5.3)
PROT SERPL-MCNC: 6.9 G/DL (ref 6.4–8.2)
RBC # BLD AUTO: 3.85 MILLION/UL (ref 3.81–5.12)
SODIUM SERPL-SCNC: 141 MMOL/L (ref 136–145)
TROPONIN I SERPL-MCNC: <0.02 NG/ML
TROPONIN I SERPL-MCNC: <0.02 NG/ML
WBC # BLD AUTO: 3.67 THOUSAND/UL (ref 4.31–10.16)

## 2019-02-11 PROCEDURE — 93005 ELECTROCARDIOGRAM TRACING: CPT

## 2019-02-11 PROCEDURE — 36415 COLL VENOUS BLD VENIPUNCTURE: CPT | Performed by: NURSE PRACTITIONER

## 2019-02-11 PROCEDURE — 84484 ASSAY OF TROPONIN QUANT: CPT | Performed by: NURSE PRACTITIONER

## 2019-02-11 PROCEDURE — 71046 X-RAY EXAM CHEST 2 VIEWS: CPT

## 2019-02-11 PROCEDURE — 99285 EMERGENCY DEPT VISIT HI MDM: CPT

## 2019-02-11 PROCEDURE — 85025 COMPLETE CBC W/AUTO DIFF WBC: CPT | Performed by: NURSE PRACTITIONER

## 2019-02-11 PROCEDURE — 80053 COMPREHEN METABOLIC PANEL: CPT | Performed by: NURSE PRACTITIONER

## 2019-02-11 NOTE — TELEPHONE ENCOUNTER
Patient went to ED this morning    Was there for 5 hrs    She had terrible chest pain    They did 2 tests and labs  Ashley Grande it's not her heart    And told her to follow up with Dr Rollo Sever    Maybe he would like her to get some other testing    She doesn't have the pain now

## 2019-02-11 NOTE — ED PROVIDER NOTES
History  Chief Complaint   Patient presents with    Chest Pain     Sharp pain under left breast at 0430 took 324mg of aspirin, chest pain subsided  Denies pain at this moment, hx of heart murmur  60-year-old female presents here with a chief complaint of chest pain under her left breast at around 0 400 hours this morning  She took aspirin as result of this reports the pain only lasted minutes became for evaluation anyway  She denies any radiation of the pain  She had no associated nausea no vomiting no diaphoresis  Currently asymptomatic otherwise healthy 60-year-old female  Prior to Admission Medications   Prescriptions Last Dose Informant Patient Reported? Taking?    Calcium Carbonate-Vitamin D3 600-400 MG-UNIT TABS  Self Yes No   Si tab daily   acetaminophen (TYLENOL) 325 mg tablet  Self No No   Sig: Take 2 tablets (650 mg total) by mouth every 6 (six) hours as needed for mild pain   atenolol (TENORMIN) 25 mg tablet   No No   Sig: Take 1 tablet (25 mg total) by mouth daily for 90 days   atorvastatin (LIPITOR) 40 mg tablet  Self No No   Sig: Take 1 tablet (40 mg total) by mouth daily   dicyclomine (BENTYL) 20 mg tablet  Self No No   Sig: Take 1 tablet (20 mg total) by mouth 2 (two) times a day as needed (abdominal spasm) for up to 5 days   docusate sodium (COLACE) 100 mg capsule  Self No No   Sig: Take 1 capsule (100 mg total) by mouth 2 (two) times a day Hold if having diarrhea   ketoconazole (NIZORAL) 2 % cream   No No   Sig: Apply topically daily   multivitamin (THERAGRAN) TABS  Self Yes No   Sig: Take 1 tablet by mouth      Facility-Administered Medications: None       Past Medical History:   Diagnosis Date    Alopecia     Cardiac arrhythmia     Compression fracture of L1 lumbar vertebra ([facility])     resolved 2014    Occlusion of carotid artery     unspecified laterality resolved 2016    Sebaceous cyst     UTI (urinary tract infection)        Past Surgical History:   Procedure Laterality Date    APPENDECTOMY      COLONOSCOPY      resolved 2009    ESOPHAGOGASTRODUODENOSCOPY      mild gastritis resolved 2009    THROMBOENDARTERECTOMY Left     carotid, resloved 12/2012    TONSILLECTOMY         Family History   Problem Relation Age of Onset    No Known Problems Mother      I have reviewed and agree with the history as documented  Social History     Tobacco Use    Smoking status: Never Smoker    Smokeless tobacco: Never Used   Substance Use Topics    Alcohol use: Not Currently     Comment: rarely (history); socially    Drug use: No        Review of Systems   Constitutional: Negative for activity change, fatigue and fever  HENT: Negative for congestion, ear pain, rhinorrhea and sore throat  Eyes: Negative  Respiratory: Negative for cough, shortness of breath and wheezing  Cardiovascular: Positive for chest pain  Gastrointestinal: Negative for abdominal pain, diarrhea, nausea and vomiting  Endocrine: Negative  Genitourinary: Negative for difficulty urinating, dyspareunia, dysuria, flank pain, frequency, menstrual problem, pelvic pain, urgency, vaginal bleeding, vaginal discharge and vaginal pain  Musculoskeletal: Negative for arthralgias and myalgias  Skin: Negative for color change and pallor  Neurological: Negative for dizziness, speech difficulty, weakness and headaches  Hematological: Negative for adenopathy  Psychiatric/Behavioral: Negative for confusion  Physical Exam  Physical Exam   Constitutional: She is oriented to person, place, and time  She appears well-developed and well-nourished  She is cooperative  Non-toxic appearance  She does not have a sickly appearance  She does not appear ill  No distress  HENT:   Head: Normocephalic and atraumatic  Right Ear: Tympanic membrane and external ear normal    Left Ear: Tympanic membrane and external ear normal    Nose: No rhinorrhea, sinus tenderness or nasal deformity  No epistaxis   Right sinus exhibits no maxillary sinus tenderness and no frontal sinus tenderness  Left sinus exhibits no maxillary sinus tenderness and no frontal sinus tenderness  Mouth/Throat: Oropharynx is clear and moist and mucous membranes are normal  Normal dentition  Eyes: Pupils are equal, round, and reactive to light  EOM are normal    Neck: Normal range of motion  Neck supple  Cardiovascular: Normal rate, regular rhythm and normal heart sounds  No murmur heard  Pulmonary/Chest: Effort normal and breath sounds normal  No accessory muscle usage  No respiratory distress  She has no wheezes  She has no rales  She exhibits no tenderness  Abdominal: Soft  She exhibits no distension  There is no guarding  Musculoskeletal: Normal range of motion  She exhibits no edema or tenderness  Lymphadenopathy:     She has no cervical adenopathy  Neurological: She is alert and oriented to person, place, and time  She exhibits normal muscle tone  Skin: Skin is warm and dry  No rash noted  No erythema  Psychiatric: She has a normal mood and affect  Nursing note and vitals reviewed        Vital Signs  ED Triage Vitals [02/11/19 0632]   Temperature Pulse Respirations Blood Pressure SpO2   97 5 °F (36 4 °C) 64 16 168/72 98 %      Temp Source Heart Rate Source Patient Position - Orthostatic VS BP Location FiO2 (%)   Oral Monitor Sitting Right arm --      Pain Score       No Pain           Vitals:    02/11/19 0632 02/11/19 0929 02/11/19 1000   BP: 168/72 140/63 109/56   Pulse: 64 68 64   Patient Position - Orthostatic VS: Sitting Lying        Visual Acuity      ED Medications  Medications - No data to display    Diagnostic Studies  Results Reviewed     Procedure Component Value Units Date/Time    Troponin I repeat in 3 hrs [[id number]]  (Normal) Collected:  02/11/19 0949    Lab Status:  Final result Specimen:  Blood from Arm, Right Updated:  02/11/19 1013     Troponin I <0 02 ng/mL     Comprehensive metabolic panel [[id number]] (Abnormal) Collected:  02/11/19 0643    Lab Status:  Final result Specimen:  Blood from Arm, Right Updated:  02/11/19 1532     Sodium 141 mmol/L      Potassium 4 2 mmol/L      Chloride 105 mmol/L      CO2 29 mmol/L      ANION GAP 7 mmol/L      BUN 22 mg/dL      Creatinine 0 99 mg/dL      Glucose 92 mg/dL      Calcium 10 4 mg/dL      AST 28 U/L      ALT 17 U/L      Alkaline Phosphatase 112 U/L      Total Protein 6 9 g/dL      Albumin 3 6 g/dL      Total Bilirubin 0 20 mg/dL      eGFR 53 ml/min/1 73sq m     Narrative:       National Kidney Disease Education Program recommendations are as follows:  GFR calculation is accurate only with a steady state creatinine  Chronic Kidney disease less than 60 ml/min/1 73 sq  meters  Kidney failure less than 15 ml/min/1 73 sq  meters  Troponin I [[id number]]  (Normal) Collected:  02/11/19 0643    Lab Status:  Final result Specimen:  Blood from Arm, Right Updated:  02/11/19 0705     Troponin I <0 02 ng/mL     CBC and differential [[id number]]  (Abnormal) Collected:  02/11/19 0643    Lab Status:  Final result Specimen:  Blood from Arm, Right Updated:  02/11/19 0649     WBC 3 67 Thousand/uL      RBC 3 85 Million/uL      Hemoglobin 11 7 g/dL      Hematocrit 36 3 %      MCV 94 fL      MCH 30 4 pg      MCHC 32 2 g/dL      RDW 13 2 %      MPV 9 9 fL      Platelets 863 Thousands/uL      nRBC 0 /100 WBCs      Neutrophils Relative 59 %      Immat GRANS % 0 %      Lymphocytes Relative 28 %      Monocytes Relative 9 %      Eosinophils Relative 3 %      Basophils Relative 1 %      Neutrophils Absolute 2 16 Thousands/µL      Immature Grans Absolute 0 01 Thousand/uL      Lymphocytes Absolute 1 04 Thousands/µL      Monocytes Absolute 0 33 Thousand/µL      Eosinophils Absolute 0 10 Thousand/µL      Basophils Absolute 0 03 Thousands/µL                  X-ray chest 2 views   Final Result by Flavia Briscoe MD (02/11 2334)      No acute cardiopulmonary disease              Workstation performed: [id number]                    Procedures  ECG 12 Lead Documentation  Date/Time: 2/11/2019 6:37 AM  Performed by: CHERYL Dickerson  Authorized by: CHERYL Dickerson     ECG reviewed by me, the ED Provider: yes    Patient location:  ED  Interpretation:     Interpretation: normal    Rate:     ECG rate:  61    ECG rate assessment: normal    Rhythm:     Rhythm: sinus rhythm    Ectopy:     Ectopy: none    QRS:     QRS axis:  Normal  Conduction:     Conduction: normal    ST segments:     ST segments:  Normal  T waves:     T waves: normal             Phone Contacts  ED Phone Contact    ED Course         HEART Risk Score      Most Recent Value   History  0 Filed at: 02/11/2019 1619   ECG  0 Filed at: 02/11/2019 1619   Age  2 Filed at: 02/11/2019 1619   Risk Factors  0 Filed at: 02/11/2019 1619   Troponin  0 Filed at: 02/11/2019 1619   Heart Score Risk Calculator   History  0 Filed at: 02/11/2019 1619   ECG  0 Filed at: 02/11/2019 1619   Age  2 Filed at: 02/11/2019 1619   Risk Factors  0 Filed at: 02/11/2019 1619   Troponin  0 Filed at: 02/11/2019 1619   HEART Score  2 Filed at: 02/11/2019 1619   HEART Score  2 Filed at: 02/11/2019 1619                            MDM  Number of Diagnoses or Management Options  Chest pain, unspecified type: new and requires workup  Diagnosis management comments: Unremarkable workup unremarkable EKG low heart score given her advanced age  Okay for discharge rec follow-up PCP         Amount and/or Complexity of Data Reviewed  Clinical lab tests: reviewed and ordered  Tests in the radiology section of CPT®: reviewed and ordered  Tests in the medicine section of CPT®: reviewed and ordered  Independent visualization of images, tracings, or specimens: yes        Disposition  Final diagnoses:   Chest pain, unspecified type     Time reflects when diagnosis was documented in both MDM as applicable and the Disposition within this note     Time User Action Codes Description Comment    2/11/2019 10:23 AM Kaur Murray Add [R07 9] Chest pain, unspecified type       ED Disposition     ED Disposition Condition Date/Time Comment    Discharge Stable Mon Feb 11, 2019 10:23 AM Belkis Zabala discharge to home/self care  Follow-up Information     Follow up With Specialties Details Why Contact Info    Marion Urena MD Internal Medicine Call  For Franco Randall  [phone]            Discharge Medication List as of 2/11/2019 10:23 AM      CONTINUE these medications which have NOT CHANGED    Details   acetaminophen (TYLENOL) 325 mg tablet Take 2 tablets (650 mg total) by mouth every 6 (six) hours as needed for mild pain, Starting Fri 4/27/2018, No Print      atenolol (TENORMIN) 25 mg tablet Take 1 tablet (25 mg total) by mouth daily for 90 days, Starting Thu 12/13/2018, Until Wed 3/13/2019, Normal      atorvastatin (LIPITOR) 40 mg tablet Take 1 tablet (40 mg total) by mouth daily, Starting Wed 8/29/2018, Normal      Calcium Carbonate-Vitamin D3 600-400 MG-UNIT TABS 1 tab daily, Historical Med      dicyclomine (BENTYL) 20 mg tablet Take 1 tablet (20 mg total) by mouth 2 (two) times a day as needed (abdominal spasm) for up to 5 days, Starting Thu 10/18/2018, Until Tue 10/23/2018, Print      docusate sodium (COLACE) 100 mg capsule Take 1 capsule (100 mg total) by mouth 2 (two) times a day Hold if having diarrhea, Starting Fri 4/27/2018, No Print      ketoconazole (NIZORAL) 2 % cream Apply topically daily, Starting Thu 10/25/2018, Normal      multivitamin (THERAGRAN) TABS Take 1 tablet by mouth, Historical Med           No discharge procedures on file      ED Provider  Electronically Signed by           CHERYL cAosta  02/11/19 5040

## 2019-02-12 LAB
ATRIAL RATE: 61 BPM
P AXIS: 57 DEGREES
PR INTERVAL: 158 MS
QRS AXIS: 46 DEGREES
QRSD INTERVAL: 88 MS
QT INTERVAL: 424 MS
QTC INTERVAL: 426 MS
T WAVE AXIS: 54 DEGREES
VENTRICULAR RATE: 61 BPM

## 2019-02-12 PROCEDURE — 93010 ELECTROCARDIOGRAM REPORT: CPT | Performed by: INTERNAL MEDICINE

## 2019-02-13 ENCOUNTER — OFFICE VISIT (OUTPATIENT)
Dept: INTERNAL MEDICINE CLINIC | Facility: CLINIC | Age: 84
End: 2019-02-13
Payer: MEDICARE

## 2019-02-13 ENCOUNTER — TELEPHONE (OUTPATIENT)
Dept: INTERNAL MEDICINE CLINIC | Facility: CLINIC | Age: 84
End: 2019-02-13

## 2019-02-13 ENCOUNTER — APPOINTMENT (OUTPATIENT)
Dept: RADIOLOGY | Facility: CLINIC | Age: 84
End: 2019-02-13
Payer: MEDICARE

## 2019-02-13 VITALS
HEART RATE: 64 BPM | SYSTOLIC BLOOD PRESSURE: 108 MMHG | DIASTOLIC BLOOD PRESSURE: 80 MMHG | WEIGHT: 154.2 LBS | OXYGEN SATURATION: 98 % | HEIGHT: 67 IN | BODY MASS INDEX: 24.2 KG/M2

## 2019-02-13 DIAGNOSIS — R07.81 RIB PAIN ON LEFT SIDE: Primary | ICD-10-CM

## 2019-02-13 DIAGNOSIS — M81.0 OSTEOPOROSIS, UNSPECIFIED OSTEOPOROSIS TYPE, UNSPECIFIED PATHOLOGICAL FRACTURE PRESENCE: ICD-10-CM

## 2019-02-13 DIAGNOSIS — R07.81 RIB PAIN ON LEFT SIDE: ICD-10-CM

## 2019-02-13 PROCEDURE — 71100 X-RAY EXAM RIBS UNI 2 VIEWS: CPT

## 2019-02-13 PROCEDURE — 99214 OFFICE O/P EST MOD 30 MIN: CPT | Performed by: PHYSICIAN ASSISTANT

## 2019-02-13 RX ORDER — ALENDRONATE SODIUM 35 MG/1
35 TABLET ORAL
Qty: 4 TABLET | Refills: 5 | Status: SHIPPED | OUTPATIENT
Start: 2019-02-13 | End: 2019-03-07 | Stop reason: SDUPTHER

## 2019-02-13 NOTE — PROGRESS NOTES
Assessment/Plan:    Chest wall pain-cardiac testing was negative in the ER  We will check a rib film  Osteoporosis-bone density test done in the past indicate osteoporosis  The patient is only taking a calcium and vitamin-D supplement at present  We will offer her alendronate 35 mg weekly  No problem-specific Assessment & Plan notes found for this encounter  Diagnoses and all orders for this visit:    Rib pain on left side  -     XR ribs 2 vw left; Future          Subjective:      Patient ID: Bubba Bocanegra is a 81 y o  female  Patient comes in for follow-up  She was in the emergency room on Monday, February 11th  She was having some pain in her chest beneath the left breast   She said it lasted about half an hour and went away on its own  She cannot think of any precipitant to cause this pain  She has not been doing any heavy lifting/activity to explain the pain  In fact she says she has been more sedentary than usual because of a back surgery that she had back in April which she had complications from and has since had some problems  She is wearing a back brace  This is causing her to be less active than she used to be  She has not had any trauma or injury to explain the chest discomfort  She went to the emergency room where blood work including troponins were done  Blood work was normal   EKG was normal as was a chest x-ray  At present the patient does not have the pain  She said when she had the pain it was not worse with taking deep breaths  She has not had any recent coughing illnesses  The patient did report to me however that during her recovery from her back surgery she was doing some physical therapy exercises  One of the physical therapist introduced a new exercise which required extension of the hip flexors  She said after that she had excruciating/severe pain in her back and she went back to her surgeon who did a CT scan of the back, ventrally    She said this CT scan showed a vertebral fracture  She was unsure whether it was a compression fracture or not  She believes this was due to the physical therapy exercises  Since then she has had chronic back pain  She does wear a back brace  She says the surgeon offered another surgery but would require extensive recovery and the patient is unwilling to do this surgery  The following portions of the patient's history were reviewed and updated as appropriate: allergies, current medications, past medical history, past social history and problem list     Review of Systems   Constitutional: Negative for chills and fever  HENT: Negative for congestion, ear pain, sinus pressure, sinus pain and sore throat  Respiratory: Negative for cough, shortness of breath and wheezing  Cardiovascular: Positive for chest pain  Negative for palpitations  Gastrointestinal: Negative for abdominal pain, diarrhea and nausea  Musculoskeletal: Positive for back pain  Objective:      /80   Pulse 64   Ht 5' 7" (1 702 m)   Wt 69 9 kg (154 lb 3 2 oz)   SpO2 98%   BMI 24 15 kg/m²          Physical Exam   Constitutional: She appears well-developed and well-nourished  HENT:   Head: Normocephalic  Eyes: Pupils are equal, round, and reactive to light  Neck: Normal range of motion  Cardiovascular: Normal rate, regular rhythm and normal heart sounds  Pulmonary/Chest: Effort normal and breath sounds normal  She exhibits tenderness and bony tenderness  Tenderness palpation in the area indicated in red       Abdominal: Soft  Bowel sounds are normal  There is no tenderness  Lymphadenopathy:     She has no cervical adenopathy

## 2019-02-13 NOTE — TELEPHONE ENCOUNTER
----- [facility] 2[facility]13/2019  3:58 PM EST -----  Please let the patient know that the x-ray of her ribs shows no fracture  I also sent a message earlier that she is to start a medication called alendronate/Fosamax 35 mg 1 every week  She can follow up with Dr Love Corado in a few months  This is for her osteoporosis    I spoke with Dr Love Corado about it and he advised that medication

## 2019-03-07 ENCOUNTER — TELEPHONE (OUTPATIENT)
Dept: INTERNAL MEDICINE CLINIC | Facility: CLINIC | Age: 84
End: 2019-03-07

## 2019-03-07 DIAGNOSIS — M81.0 OSTEOPOROSIS, UNSPECIFIED OSTEOPOROSIS TYPE, UNSPECIFIED PATHOLOGICAL FRACTURE PRESENCE: ICD-10-CM

## 2019-03-07 RX ORDER — ALENDRONATE SODIUM 35 MG/1
35 TABLET ORAL
Qty: 12 TABLET | Refills: 1 | Status: SHIPPED | OUTPATIENT
Start: 2019-03-07 | End: 2019-09-16

## 2019-03-07 NOTE — TELEPHONE ENCOUNTER
Eneida prescribed Ashish Nose   Alendronate sodium tablets 35 mg      She would like to not have to renew the script every month  She thinks it would be cheaper if she was able to get a 3 month supply at a time instead of once a month  Please send this to [employer] if this can be done   Otherwise can we please contact pt at  [phone]

## 2019-04-12 ENCOUNTER — TELEPHONE (OUTPATIENT)
Dept: INTERNAL MEDICINE CLINIC | Facility: CLINIC | Age: 84
End: 2019-04-12

## 2019-04-12 DIAGNOSIS — N39.0 RECURRENT URINARY TRACT INFECTION: Primary | ICD-10-CM

## 2019-04-12 RX ORDER — NITROFURANTOIN 25; 75 MG/1; MG/1
CAPSULE ORAL
Qty: 90 CAPSULE | Refills: 3 | Status: SHIPPED | OUTPATIENT
Start: 2019-04-12 | End: 2019-11-20

## 2019-05-28 ENCOUNTER — TELEPHONE (OUTPATIENT)
Dept: INTERNAL MEDICINE CLINIC | Facility: CLINIC | Age: 84
End: 2019-05-28

## 2019-05-28 DIAGNOSIS — M81.0 OSTEOPOROSIS, UNSPECIFIED OSTEOPOROSIS TYPE, UNSPECIFIED PATHOLOGICAL FRACTURE PRESENCE: Primary | ICD-10-CM

## 2019-06-12 DIAGNOSIS — I10 HYPERTENSION, UNSPECIFIED TYPE: ICD-10-CM

## 2019-06-12 RX ORDER — ATENOLOL 25 MG/1
TABLET ORAL
Qty: 90 TABLET | Refills: 1 | Status: SHIPPED | OUTPATIENT
Start: 2019-06-12 | End: 2019-09-03 | Stop reason: SDUPTHER

## 2019-07-18 ENCOUNTER — TELEPHONE (OUTPATIENT)
Dept: INTERNAL MEDICINE CLINIC | Facility: CLINIC | Age: 84
End: 2019-07-18

## 2019-07-18 DIAGNOSIS — K59.01 SLOW TRANSIT CONSTIPATION: Primary | ICD-10-CM

## 2019-07-18 RX ORDER — POLYETHYLENE GLYCOL 3350 17 G/17G
17 POWDER, FOR SOLUTION ORAL DAILY
Qty: 510 G | Refills: 2 | Status: SHIPPED | OUTPATIENT
Start: 2019-07-18 | End: 2019-09-04 | Stop reason: ALTCHOICE

## 2019-07-18 NOTE — TELEPHONE ENCOUNTER
Patient called back- she said she's going to call tomorrow right at 7am to hopefully get an appointment for tomorrow  She's still upset about the situation but I think she's a little bit more at ease  If she needs anything, she will let us know  Still wouldn't disclose reason for appt

## 2019-07-26 ENCOUNTER — OFFICE VISIT (OUTPATIENT)
Dept: GASTROENTEROLOGY | Facility: CLINIC | Age: 84
End: 2019-07-26
Payer: MEDICARE

## 2019-07-26 VITALS
DIASTOLIC BLOOD PRESSURE: 70 MMHG | BODY MASS INDEX: 23.54 KG/M2 | HEIGHT: 67 IN | SYSTOLIC BLOOD PRESSURE: 134 MMHG | HEART RATE: 62 BPM | RESPIRATION RATE: 14 BRPM | WEIGHT: 150 LBS

## 2019-07-26 DIAGNOSIS — K59.09 OTHER CONSTIPATION: Primary | ICD-10-CM

## 2019-07-26 PROCEDURE — 99214 OFFICE O/P EST MOD 30 MIN: CPT | Performed by: INTERNAL MEDICINE

## 2019-07-26 RX ORDER — TROSPIUM CHLORIDE 20 MG/1
TABLET, FILM COATED ORAL
COMMUNITY
End: 2019-11-20

## 2019-07-26 RX ORDER — MELOXICAM 15 MG/1
TABLET ORAL
COMMUNITY
End: 2019-11-20

## 2019-07-26 RX ORDER — LISINOPRIL 10 MG/1
TABLET ORAL
COMMUNITY
End: 2019-11-20

## 2019-07-26 RX ORDER — IBUPROFEN 600 MG/1
TABLET ORAL
COMMUNITY
End: 2019-11-20

## 2019-07-26 RX ORDER — ESTRADIOL 0.1 MG/G
CREAM VAGINAL
COMMUNITY
End: 2021-03-03

## 2019-07-26 RX ORDER — ALPRAZOLAM 0.5 MG/1
TABLET ORAL
COMMUNITY
End: 2019-11-20

## 2019-07-26 RX ORDER — NITROFURANTOIN MACROCRYSTALS 100 MG/1
CAPSULE ORAL
COMMUNITY
End: 2019-09-16 | Stop reason: SDUPTHER

## 2019-07-26 RX ORDER — SULFAMETHOXAZOLE AND TRIMETHOPRIM 800; 160 MG/1; MG/1
TABLET ORAL
COMMUNITY
End: 2019-11-20

## 2019-07-26 RX ORDER — LISINOPRIL 5 MG/1
TABLET ORAL
COMMUNITY
End: 2019-11-20

## 2019-07-26 RX ORDER — NAPROXEN 375 MG/1
TABLET ORAL
COMMUNITY
End: 2019-11-20

## 2019-07-26 RX ORDER — METHOCARBAMOL 750 MG/1
TABLET, FILM COATED ORAL
COMMUNITY
End: 2019-11-20

## 2019-07-26 RX ORDER — CEPHALEXIN 500 MG/1
CAPSULE ORAL
COMMUNITY
End: 2019-11-20

## 2019-07-26 RX ORDER — MELOXICAM 7.5 MG/1
TABLET ORAL
COMMUNITY
End: 2019-11-20

## 2019-07-26 RX ORDER — NYSTATIN AND TRIAMCINOLONE ACETONIDE 100000; 1 [USP'U]/G; MG/G
OINTMENT TOPICAL
COMMUNITY
Start: 2019-02-18 | End: 2021-03-03

## 2019-07-26 RX ORDER — ONDANSETRON 4 MG/1
TABLET, ORALLY DISINTEGRATING ORAL
COMMUNITY
End: 2019-11-20

## 2019-07-26 RX ORDER — OXYCODONE HYDROCHLORIDE AND ACETAMINOPHEN 5; 325 MG/1; MG/1
TABLET ORAL
COMMUNITY
End: 2019-09-04 | Stop reason: ALTCHOICE

## 2019-07-26 NOTE — PROGRESS NOTES
[facility]      Chief Complaint:   Constipation    HPI:  Kamaljit Oconnell is a 81 y o   female who presents with  Recent constipation  Patient has a history of colonoscopy about 5 or 6 years ago which was essentially unremarkable  Recently had a change in bowel habits  She notes that sometime ago she had back surgery  She has become much more sedentary because of the chronic back pain  The surgery apparently was not 2 successful  She has experienced significant constipation over the last 2 months  She has tried a variety of medications  At 1st stool softeners work but they stop working  She then tried Colace, Dulcolax, and Metamucil  None of these actually helped  She has a bowel movement every 4 or 5 days  There is no bleeding  She had some weight loss but this is apparently stabilized  She has no abdominal pain except when it comes time to have a bowel movement  No nausea or vomiting  No fever or chills  No other significant GI symptomatology at this point  Review of Systems:   Constitutional: No fever or chills, feels well, no tiredness, no recent weight gain or weight loss  HENT: No complaints of earache, no hearing loss, no nosebleeds, no nasal discharge, no sore throat, no hoarseness  Eyes: No complaints of eye pain, no red eyes, no discharge from eyes, no itchy eyes  Cardiovascular: No complaints of slow heart rate, no fast heart rate, no chest pain, no palpitations, no leg claudication, no lower extremity edema  Respiratory: No complaints of shortness of breath, no wheezing, no cough, no SOB on exertion, no orthopnea  Gastrointestinal: As noted in HPI  Genitourinary: No complaints of dysuria, no incontinence, no hesitancy, no nocturia  Musculoskeletal:  As per HPI  Neurological: No complaints of headache, no confusion, no convulsions, no numbness or tingling, no dizziness or fainting, no limb weakness,  Positive difficulty walking      Skin: No complaints of skin rash or skin lesions, no itching, no skin wound, no dry skin  Hematological/Lymphatic: No complaints of swollen glands, does not bleed easy  Allergic/Immunologic: No immunocompromised state  Endocrine:  No complaints of polyuria, no polydipsia  Psychiatric/Behavioral: is not suicidal, no sleep disturbances, no anxiety or depression, no change in personality, no emotional problems         Historical Information   Past Medical History:   Diagnosis Date    Alopecia     Cardiac arrhythmia     Compression fracture of L1 lumbar vertebra ([address] )     resolved 09/2014    Occlusion of carotid artery     unspecified laterality resolved 08/05/2016    Sebaceous cyst     UTI (urinary tract infection)      Past Surgical History:   Procedure Laterality Date    APPENDECTOMY      BACK SURGERY      COLONOSCOPY      resolved 2009    ESOPHAGOGASTRODUODENOSCOPY      mild gastritis resolved 2009    THROMBOENDARTERECTOMY Left     carotid, resloved 12/2012    TONSILLECTOMY       Social History   Social History     Substance and Sexual Activity   Alcohol Use Not Currently    Comment: rarely (history); socially     Social History     Substance and Sexual Activity   Drug Use No     Social History     Tobacco Use   Smoking Status Never Smoker   Smokeless Tobacco Never Used     Family History   Problem Relation Age of Onset    No Known Problems Mother          Current Medications: has a current medication list which includes the following prescription(s): atenolol, atorvastatin, calcium carbonate-vitamin d3, docusate sodium, estradiol, multivitamin, polyethylene glycol, acetaminophen, alendronate, alprazolam, cephalexin, dicyclomine, ibuprofen, ketoconazole, linaclotide, lisinopril, lisinopril, meloxicam, meloxicam, methocarbamol, mirabegron er, naproxen, nitrofurantoin, nitrofurantoin, nystatin-triamcinolone, ondansetron, oxycodone-acetaminophen, sod fluoride-potassium nitrate, sulfamethoxazole-trimethoprim, and trospium chloride  Vital Signs: /70   Pulse 62   Resp 14   Ht 5' 7" (1 702 m)   Wt 68 kg (150 lb)   BMI 23 49 kg/m²       Physical Exam:   Constitutional  General Appearance: No acute distress, well appearing and well nourished  Uses a quad cane to walk  Head  Normocephalic  Eyes  Conjunctivae and lids: No swelling, erythema, or discharge  Pupils and irises: Equal, round and reactive to light  Ears, Nose, Mouth, and Throat  External inspection of ears and nose: Normal  Nasal mucosa, septum and turbinates: Normal without edema or erythema/   Oropharynx: Normal with no erythema, edema, exudate or lesions  Neck  Normal range of motion  Neck supple  Cardiovascular  Auscultation of the heart: Normal rate and rhythm, normal S1 and S2 without murmurs  Examination of the extremities for edema and/or varicosities: Normal  Pulmonary/Chest  Respiratory effort: No increased work of breathing or signs of respiratory distress  Auscultation of lungs: Clear to auscultation, equal breath sounds bilaterally, no wheezes, rales, no rhonchi  Abdomen  Abdomen: Non-tender, no masses  Liver and spleen: No hepatomegaly or splenomegaly  Musculoskeletal  Gait and station:  Stooped, uses a quad cane  Digits and Nails: normal without clubbing or cyanosis  Inspection/palpation of joints, bones, and muscles:  Positive kyphosis  Neurological  No nystagmus or asterixis  Skin  Skin and subcutaneous tissue: Normal without rashes or lesions  Lymphatic  Palpation of the lymph nodes in neck: No lymphadenopathy     Psychiatric  Orientation to person, place and time: Normal   Mood and affect: Normal          Labs:  Lab Results   Component Value Date    ALT 17 02/11/2019    AST 28 02/11/2019    BUN 22 02/11/2019    CALCIUM 10 4 (H) 02/11/2019     02/11/2019    CHOL 151 12/05/2015    CO2 29 02/11/2019    CREATININE 0 99 02/11/2019    HDL 73 (H) 12/21/2017    HCT 36 3 02/11/2019    HGB 11 7 02/11/2019    PLT 152 02/11/2019    K 4 2 02/11/2019     (L) 12/05/2015    TRIG 61 01/30/2017    WBC 3 67 (L) 02/11/2019         X-Rays & Procedures:   No orders to display           ______________________________________________________________________      Assessment & Plan:     Alexandre Canales was seen today for constipation  Diagnoses and all orders for this visit:    Other constipation  -     linaCLOtide (LINZESS) 145 MCG CAPS; Take 1 capsule (145 mcg total) by mouth every morning     patient will be given a trial of Linzess 145 micro g  She will undergo a stool   Fit test  Further recommendations will depend on study results and her progress  She will call me in 1 week

## 2019-07-30 ENCOUNTER — TELEPHONE (OUTPATIENT)
Dept: GASTROENTEROLOGY | Facility: CLINIC | Age: 84
End: 2019-07-30

## 2019-07-30 NOTE — TELEPHONE ENCOUNTER
ptn states she has been using the linzess 145 for 3 days now and she just made a BM today and it wasn't very much   Please advise, Dr Alejandar Lopez said to call with progress

## 2019-08-13 ENCOUNTER — TELEPHONE (OUTPATIENT)
Dept: GASTROENTEROLOGY | Facility: CLINIC | Age: 84
End: 2019-08-13

## 2019-08-13 NOTE — TELEPHONE ENCOUNTER
ptn states the linzess is not working for her and she has been using it as directed   Ptn is not going to take it anymore

## 2019-08-16 ENCOUNTER — APPOINTMENT (OUTPATIENT)
Dept: LAB | Facility: CLINIC | Age: 84
End: 2019-08-16
Payer: MEDICARE

## 2019-08-16 DIAGNOSIS — K59.09 OTHER CONSTIPATION: ICD-10-CM

## 2019-08-16 LAB — HEMOCCULT STL QL IA: NEGATIVE

## 2019-08-16 PROCEDURE — G0328 FECAL BLOOD SCRN IMMUNOASSAY: HCPCS

## 2019-08-20 NOTE — TELEPHONE ENCOUNTER
ptn called back and she still is unable to go to the bathroom using the OTC medication  She would like to know what to do

## 2019-08-20 NOTE — TELEPHONE ENCOUNTER
Spoke to patient  She was having better relief with Miralax one capful daily than Linzess 290    She is going to take Miralax 1 cap BID   Pls reschedule her F/u appt either with Strohlein or Carlota  If fails miralax can use Motegrity

## 2019-08-26 ENCOUNTER — HOSPITAL ENCOUNTER (OUTPATIENT)
Dept: MAMMOGRAPHY | Facility: CLINIC | Age: 84
Discharge: HOME | End: 2019-08-26
Payer: MEDICARE

## 2019-08-26 DIAGNOSIS — M81.0 OSTEOPOROSIS, UNSPECIFIED OSTEOPOROSIS TYPE, UNSPECIFIED PATHOLOGICAL FRACTURE PRESENCE: ICD-10-CM

## 2019-08-26 PROCEDURE — 77080 DXA BONE DENSITY AXIAL: CPT

## 2019-09-02 DIAGNOSIS — I10 HYPERTENSION, UNSPECIFIED TYPE: ICD-10-CM

## 2019-09-03 DIAGNOSIS — I10 HYPERTENSION, UNSPECIFIED TYPE: ICD-10-CM

## 2019-09-03 RX ORDER — ATENOLOL 25 MG/1
25 TABLET ORAL DAILY
Qty: 90 TABLET | Refills: 0 | Status: CANCELLED | OUTPATIENT
Start: 2019-09-03

## 2019-09-03 RX ORDER — ATENOLOL 25 MG/1
25 TABLET ORAL DAILY
Qty: 90 TABLET | Refills: 1 | Status: SHIPPED | OUTPATIENT
Start: 2019-09-03 | End: 2019-11-06 | Stop reason: SDUPTHER

## 2019-09-04 ENCOUNTER — TRANSCRIBE ORDERS (OUTPATIENT)
Dept: LAB | Facility: CLINIC | Age: 84
End: 2019-09-04

## 2019-09-04 ENCOUNTER — TELEPHONE (OUTPATIENT)
Dept: GASTROENTEROLOGY | Facility: CLINIC | Age: 84
End: 2019-09-04

## 2019-09-04 ENCOUNTER — OFFICE VISIT (OUTPATIENT)
Dept: GASTROENTEROLOGY | Facility: CLINIC | Age: 84
End: 2019-09-04
Payer: MEDICARE

## 2019-09-04 ENCOUNTER — APPOINTMENT (OUTPATIENT)
Dept: LAB | Facility: CLINIC | Age: 84
End: 2019-09-04
Payer: MEDICARE

## 2019-09-04 VITALS
SYSTOLIC BLOOD PRESSURE: 148 MMHG | DIASTOLIC BLOOD PRESSURE: 77 MMHG | BODY MASS INDEX: 23.18 KG/M2 | HEART RATE: 60 BPM | WEIGHT: 148 LBS

## 2019-09-04 DIAGNOSIS — K59.00 CONSTIPATION, UNSPECIFIED CONSTIPATION TYPE: Primary | ICD-10-CM

## 2019-09-04 DIAGNOSIS — K59.00 CONSTIPATION, UNSPECIFIED CONSTIPATION TYPE: ICD-10-CM

## 2019-09-04 LAB — TSH SERPL DL<=0.05 MIU/L-ACNC: 0.92 UIU/ML (ref 0.36–3.74)

## 2019-09-04 PROCEDURE — 84443 ASSAY THYROID STIM HORMONE: CPT

## 2019-09-04 PROCEDURE — 36415 COLL VENOUS BLD VENIPUNCTURE: CPT

## 2019-09-04 PROCEDURE — 99213 OFFICE O/P EST LOW 20 MIN: CPT | Performed by: PHYSICIAN ASSISTANT

## 2019-09-04 NOTE — PROGRESS NOTES
[facility] - Outpatient Follow-up Note  Claire David 81 y o  female MRN: [id number]  Encounter: [id number]          ASSESSMENT AND PLAN:      1  Constipation, unspecified constipation type    Patient presents for follow up of ongoing constipation x several months  Recent FIT was negative  She reports an unremarkable Colonoscopy about 5-6 years ago  She failed over the counter stool softeners and fiber supplements  She tried Linzess but did not tolerate this medication well and Miralax provided insufficient relief  Will begin trial of Amitiza 24mcg po BID with food  Will check TSH level  Note: I did suggest a potential Colonoscopy to further investigate given the fact that she does admit to ongoing weight loss  Patient reports she would be agreeable at next office visit if she continues with persisting problems/continued weight loss  Could also consider Motegrity if she fails Amitiza  She will follow up in 3 weeks  ______________________________________________________________________    SUBJECTIVE:  Patient is an 81year-old female who presents for follow-up of ongoing constipation  Patient per reports ongoing constipation over the last several months  She did have a FIT which was negative  She reports her last colonoscopy was about 5-6 years ago and was unremarkable  She reports no relief with over-the-counter stool softeners or fiber supplements  She tried Linzess 145 mcg daily without relief and Linzess 290 mcg just lead to diarrhea  She has also tried MiraLax at different dosing regimens up to a capful twice a day however reports that the BMs produced seemed to be incomplete  No rectal bleeding  No abdominal pain or bloating  She does have chronic back pain and is more sedentary due to this problem  She also has a uterine vaginal prolapse for which she is following with Uro gyn  She denies any new medications prior to the start of her constipation  She is not taking any narcotic pain medications  She does report of associated, ongoing weight loss and attributes this to her overall decreased appetite (she is eating less)  No nausea or vomiting  REVIEW OF SYSTEMS IS OTHERWISE NEGATIVE        Historical Information   Past Medical History:   Diagnosis Date    Alopecia     Cardiac arrhythmia     Compression fracture of L1 lumbar vertebra ([facility])     resolved 09/2014    Occlusion of carotid artery     unspecified laterality resolved 08/05/2016    Sebaceous cyst     UTI (urinary tract infection)      Past Surgical History:   Procedure Laterality Date    APPENDECTOMY      BACK SURGERY      COLONOSCOPY      resolved 2009    ESOPHAGOGASTRODUODENOSCOPY      mild gastritis resolved 2009    THROMBOENDARTERECTOMY Left     carotid, resloved 12/2012    TONSILLECTOMY       Social History   Social History     Substance and Sexual Activity   Alcohol Use Not Currently    Comment: rarely (history); socially     Social History     Substance and Sexual Activity   Drug Use No     Social History     Tobacco Use   Smoking Status Never Smoker   Smokeless Tobacco Never Used     Family History   Problem Relation Age of Onset    No Known Problems Mother        Meds/Allergies       Current Outpatient Medications:     acetaminophen (TYLENOL) 325 mg tablet    alendronate (FOSAMAX) 35 mg tablet    ALPRAZolam (XANAX) 0 5 mg tablet    atenolol (TENORMIN) 25 mg tablet    atorvastatin (LIPITOR) 40 mg tablet    Calcium Carbonate-Vitamin D3 600-400 MG-UNIT TABS    cephalexin (KEFLEX) 500 mg capsule    docusate sodium (COLACE) 100 mg capsule    estradiol (ESTRACE VAGINAL) 0 1 mg/g vaginal cream    ibuprofen (MOTRIN) 600 mg tablet    ketoconazole (NIZORAL) 2 % cream    linaCLOtide (LINZESS) 145 MCG CAPS    lisinopril (ZESTRIL) 10 mg tablet    lisinopril (ZESTRIL) 5 mg tablet    meloxicam (MOBIC) 15 mg tablet    meloxicam (MOBIC) 7 5 mg tablet    methocarbamol (ROBAXIN) 750 mg tablet    Mirabegron ER (MYRBETRIQ) 25 MG TB24    multivitamin (THERAGRAN) TABS    naproxen (NAPROSYN) 375 mg tablet    nitrofurantoin (MACROBID) 100 mg capsule    nitrofurantoin (MACRODANTIN) 100 mg capsule    nystatin-triamcinolone (MYCOLOG-II) ointment    ondansetron (ZOFRAN-ODT) 4 mg disintegrating tablet    oxyCODONE-acetaminophen (PERCOCET) 5-325 mg per tablet    polyethylene glycol (GLYCOLAX) powder    Sod Fluoride-Potassium Nitrate (PREVIDENT 5000 SENSITIVE) 1 1-5 % PSTE    sulfamethoxazole-trimethoprim (BACTRIM DS) 800-160 mg per tablet    trospium chloride (SANCTURA) 20 mg tablet    dicyclomine (BENTYL) 20 mg tablet    Allergies   Allergen Reactions    Myrbetriq [Mirabegron] GI Intolerance and Hives    Latex Hives and Swelling    Penicillins            Objective     Blood pressure 148/77, pulse 60, weight 67 1 kg (148 lb)  Body mass index is 23 18 kg/m²  PHYSICAL EXAM:      General Appearance:   Alert, cooperative, no distress   HEENT:   Normocephalic, atraumatic, anicteric      Neck:  Supple, symmetrical, trachea midline   Lungs:   Clear to auscultation bilaterally; no rales, rhonchi or wheezing; respirations unlabored    Heart[de-identified]   Regular rate and rhythm; no murmur, rub, or gallop  Abdomen:   Soft, non-tender, non-distended; normal bowel sounds; no masses, no organomegaly    Genitalia:   Deferred    Rectal:   Deferred    Extremities:  No cyanosis, clubbing or edema    Pulses:  2+ and symmetric    Skin:  No jaundice, rashes, or lesions    Lymph nodes:  No palpable cervical lymphadenopathy        Lab Results:   No visits with results within 1 Day(s) from this visit  Latest known visit with results is:   Appointment on 08/16/2019   Component Date Value    OCCULT BLD, FECAL IMMUNO* 08/16/2019 Negative          Radiology Results:   No results found

## 2019-09-04 NOTE — TELEPHONE ENCOUNTER
Hossein from the lab phoned     An order for labs needs to be clarified     Please phone ASAP as patient is there in the chair now     [phone]

## 2019-09-06 ENCOUNTER — TELEPHONE (OUTPATIENT)
Dept: GASTROENTEROLOGY | Facility: CLINIC | Age: 84
End: 2019-09-06

## 2019-09-06 NOTE — TELEPHONE ENCOUNTER
I do not see that Carlota ordered any lab tests from her last visit  She had a negative stool test and negative thyroid studies which had been done prior to her seeing Kristy  Exactly what tests were we talking about? Citlali Toure

## 2019-09-16 ENCOUNTER — OFFICE VISIT (OUTPATIENT)
Dept: INTERNAL MEDICINE CLINIC | Facility: CLINIC | Age: 84
End: 2019-09-16
Payer: MEDICARE

## 2019-09-16 VITALS
HEIGHT: 67 IN | OXYGEN SATURATION: 97 % | SYSTOLIC BLOOD PRESSURE: 130 MMHG | HEART RATE: 68 BPM | WEIGHT: 143 LBS | BODY MASS INDEX: 22.44 KG/M2 | DIASTOLIC BLOOD PRESSURE: 70 MMHG

## 2019-09-16 DIAGNOSIS — K59.01 SLOW TRANSIT CONSTIPATION: Primary | ICD-10-CM

## 2019-09-16 PROBLEM — M81.0 AGE-RELATED OSTEOPOROSIS WITHOUT CURRENT PATHOLOGICAL FRACTURE: Status: ACTIVE | Noted: 2019-09-16

## 2019-09-16 PROCEDURE — 99213 OFFICE O/P EST LOW 20 MIN: CPT | Performed by: INTERNAL MEDICINE

## 2019-09-16 NOTE — PROGRESS NOTES
Assessment/Plan:       Diagnoses and all orders for this visit:    Slow transit constipation          Patient Instructions     Constipation treated by Dr Rosi Campos   We need a bone density result  Follow up with me in 3-4 months  Subjective:      Patient ID: Tonie Mishra is a 81 y o  female  The patient is here today because she wants some information about some prior results  She has chronic constipation and has follow-up with Gastroenterology  Currently, she is taking Amitiza with apparent at least acceptable results  Prior to that, she had failed Colace and Linzess  However, she had a Hemoccult done a couple of weeks ago  This was reported to be normal     She also asks about a bone density test   This was recorded as being completed but I do not see a result!    The patient had been given Fosamax for several months but stopped it right before the bone density per instructions and has not resumed  She had no GI problems with the Fosamax  The patient has failed back syndrome  She had lumbar spurs 3 which did not relieve pain  She requests a parking placard  She does not want to take any farm of chronic pain medication    Follows with Dr Ramya Benavides for urinary incontinence      The following portions of the patient's history were reviewed and updated as appropriate:   She has a past medical history of Alopecia, Cardiac arrhythmia, Compression fracture of L1 lumbar vertebra ([address] ), Occlusion of carotid artery, and Sebaceous cyst ,  does not have any pertinent problems on file  ,   has a past surgical history that includes Appendectomy; Thromboendarterectomy (Left); Colonoscopy; Esophagogastroduodenoscopy; Tonsillectomy; and Back surgery  ,  family history includes No Known Problems in her mother  ,   reports that she has never smoked  She has never used smokeless tobacco  She reports that she drank alcohol  She reports that she does not use drugs  ,  is allergic to myrbetriq [mirabegron]; latex; and penicillins     Current Outpatient Medications   Medication Sig Dispense Refill    atenolol (TENORMIN) 25 mg tablet Take 1 tablet (25 mg total) by mouth daily 90 tablet 1    atorvastatin (LIPITOR) 40 mg tablet Take 1 tablet (40 mg total) by mouth daily 90 tablet 3    estradiol (ESTRACE VAGINAL) 0 1 mg/g vaginal cream Estrace 0 01% (0 1 mg/gram) vaginal cream      lubiprostone (AMITIZA) 24 mcg capsule Take 1 capsule (24 mcg total) by mouth 2 (two) times a day with meals 60 capsule 1    multivitamin (THERAGRAN) TABS Take 1 tablet by mouth      ALPRAZolam (XANAX) 0 5 mg tablet alprazolam 0 5 mg tablet      Calcium Carbonate-Vitamin D3 600-400 MG-UNIT TABS 1 tab daily      cephalexin (KEFLEX) 500 mg capsule cephalexin 500 mg capsule      ibuprofen (MOTRIN) 600 mg tablet ibuprofen 600 mg tablet      lisinopril (ZESTRIL) 10 mg tablet lisinopril 10 mg tablet      lisinopril (ZESTRIL) 5 mg tablet lisinopril 5 mg tablet      meloxicam (MOBIC) 15 mg tablet meloxicam 15 mg tablet      meloxicam (MOBIC) 7 5 mg tablet meloxicam 7 5 mg tablet      methocarbamol (ROBAXIN) 750 mg tablet methocarbamol 750 mg tablet      Mirabegron ER (MYRBETRIQ) 25 MG TB24 Myrbetriq 25 mg tablet,extended release      naproxen (NAPROSYN) 375 mg tablet naproxen 375 mg tablet      nitrofurantoin (MACROBID) 100 mg capsule 1 CAPSULE DAILY 90 capsule 3    nystatin-triamcinolone (MYCOLOG-II) ointment apply to affected area twice a day      ondansetron (ZOFRAN-ODT) 4 mg disintegrating tablet ondansetron 4 mg disintegrating tablet      Sod Fluoride-Potassium Nitrate (PREVIDENT 5000 SENSITIVE) 1 1-5 % PSTE PreviDent 5000 Sensitive 1 1 %-5 % dental paste      sulfamethoxazole-trimethoprim (BACTRIM DS) 800-160 mg per tablet sulfamethoxazole 800 mg-trimethoprim 160 mg tablet      trospium chloride (SANCTURA) 20 mg tablet trospium 20 mg tablet       No current facility-administered medications for this visit          Review of Systems Constitutional: Negative for fatigue and fever  Respiratory: Negative for chest tightness and shortness of breath  Cardiovascular: Negative for chest pain  Gastrointestinal: Positive for abdominal pain and constipation  Negative for blood in stool, diarrhea, nausea and vomiting  Genitourinary: Positive for urgency  Musculoskeletal: Positive for arthralgias and back pain  Skin: Negative for rash  Psychiatric/Behavioral: Negative for dysphoric mood  The patient is not nervous/anxious  Objective:  Vitals:    09/16/19 1058   BP: 130/70   Pulse: 68   SpO2: 97%      Physical Exam   Constitutional:    Alert female patient who appears to be stated age  Cardiovascular: Normal rate  Pulmonary/Chest: Effort normal    Neurological: She is alert  Skin: Skin is warm

## 2019-09-16 NOTE — PATIENT INSTRUCTIONS
Constipation treated by Dr Mayda Brunner   We need a bone density result  Follow up with me in 3-4 months

## 2019-09-20 ENCOUNTER — TELEPHONE (OUTPATIENT)
Dept: INTERNAL MEDICINE CLINIC | Facility: CLINIC | Age: 84
End: 2019-09-20

## 2019-09-23 DIAGNOSIS — M81.0 AGE-RELATED OSTEOPOROSIS WITHOUT CURRENT PATHOLOGICAL FRACTURE: Primary | ICD-10-CM

## 2019-09-23 RX ORDER — ALENDRONATE SODIUM 35 MG/1
35 TABLET ORAL
Qty: 12 TABLET | Refills: 3 | Status: SHIPPED | OUTPATIENT
Start: 2019-09-23 | End: 2021-03-03

## 2019-09-24 ENCOUNTER — OFFICE VISIT (OUTPATIENT)
Dept: GASTROENTEROLOGY | Facility: CLINIC | Age: 84
End: 2019-09-24
Payer: MEDICARE

## 2019-09-24 VITALS
DIASTOLIC BLOOD PRESSURE: 72 MMHG | WEIGHT: 146 LBS | RESPIRATION RATE: 16 BRPM | BODY MASS INDEX: 22.91 KG/M2 | SYSTOLIC BLOOD PRESSURE: 134 MMHG | HEIGHT: 67 IN | HEART RATE: 69 BPM

## 2019-09-24 DIAGNOSIS — K59.01 SLOW TRANSIT CONSTIPATION: Primary | ICD-10-CM

## 2019-09-24 PROCEDURE — 99213 OFFICE O/P EST LOW 20 MIN: CPT | Performed by: PHYSICIAN ASSISTANT

## 2019-09-24 RX ORDER — LUBIPROSTONE 8 UG/1
8 CAPSULE, GELATIN COATED ORAL 2 TIMES DAILY WITH MEALS
Qty: 16 CAPSULE | Refills: 0 | Status: CANCELLED | COMMUNITY
Start: 2019-09-24

## 2019-09-24 NOTE — PROGRESS NOTES
[facility] - Outpatient Follow-up Note  Darwin Coffman 81 y o  female MRN: [id number]  Encounter: [id number]          ASSESSMENT AND PLAN:      1  Slow transit constipation    She is doing much better on Amitiza 24mcg po BID with a BM almost daily  TSH level was normal   Recent FIT test was negative  She previously failed stool softeners, Miralax, and Linzess  Will continue Amitiza 24mcg po BID  She did report weight loss after her spine surgery but believes she is now stabilizing and denies any further drastic weight loss  She will continue to monitor her weight and if continued weight loss, she was instructed to call our office for an endoscopic evaluation and CT scan  Otherwise, if she is doing well without problems, will have her follow up routinely in 6 months   ______________________________________________________________________    SUBJECTIVE:  Patient is an 81year old female who presents for follow up  She is doing much better on Amitiza 24mcg po BID  She reports she is having a BM almost every day  No abdominal pain  No rectal bleeding  TSH was normal   Recent FIT test was negative  She reports she had a colonoscopy about 5 years ago which was normal   She does have chronic back pain/prior spinal surgery and reports she was loosing weight previously but has now seemed to stabilize  She is also following with Uro gyn for a vaginal prolapse  REVIEW OF SYSTEMS IS OTHERWISE NEGATIVE        Historical Information   Past Medical History:   Diagnosis Date    Alopecia     Cardiac arrhythmia     Compression fracture of L1 lumbar vertebra ([address] )     resolved 09/2014    Occlusion of carotid artery     unspecified laterality resolved 08/05/2016    Sebaceous cyst      Past Surgical History:   Procedure Laterality Date    APPENDECTOMY      BACK SURGERY      COLONOSCOPY      resolved 2009    ESOPHAGOGASTRODUODENOSCOPY      mild gastritis resolved 2009    THROMBOENDARTERECTOMY Left     carotid, resloved 12/2012    TONSILLECTOMY       Social History   Social History     Substance and Sexual Activity   Alcohol Use Not Currently    Comment: rarely (history); socially     Social History     Substance and Sexual Activity   Drug Use No     Social History     Tobacco Use   Smoking Status Never Smoker   Smokeless Tobacco Never Used     Family History   Problem Relation Age of Onset    No Known Problems Mother        Meds/Allergies       Current Outpatient Medications:     alendronate (FOSAMAX) 35 mg tablet    ALPRAZolam (XANAX) 0 5 mg tablet    atenolol (TENORMIN) 25 mg tablet    atorvastatin (LIPITOR) 40 mg tablet    Calcium Carbonate-Vitamin D3 600-400 MG-UNIT TABS    cephalexin (KEFLEX) 500 mg capsule    estradiol (ESTRACE VAGINAL) 0 1 mg/g vaginal cream    ibuprofen (MOTRIN) 600 mg tablet    lisinopril (ZESTRIL) 10 mg tablet    lisinopril (ZESTRIL) 5 mg tablet    lubiprostone (AMITIZA) 24 mcg capsule    meloxicam (MOBIC) 15 mg tablet    meloxicam (MOBIC) 7 5 mg tablet    methocarbamol (ROBAXIN) 750 mg tablet    Mirabegron ER (MYRBETRIQ) 25 MG TB24    multivitamin (THERAGRAN) TABS    naproxen (NAPROSYN) 375 mg tablet    nitrofurantoin (MACROBID) 100 mg capsule    nystatin-triamcinolone (MYCOLOG-II) ointment    ondansetron (ZOFRAN-ODT) 4 mg disintegrating tablet    Sod Fluoride-Potassium Nitrate (PREVIDENT 5000 SENSITIVE) 1 1-5 % PSTE    sulfamethoxazole-trimethoprim (BACTRIM DS) 800-160 mg per tablet    trospium chloride (SANCTURA) 20 mg tablet    Allergies   Allergen Reactions    Myrbetriq [Mirabegron] GI Intolerance and Hives    Latex Hives and Swelling    Penicillins            Objective     Blood pressure 134/72, pulse 69, resp  rate 16, height 5' 7" (1 702 m), weight 66 2 kg (146 lb)  Body mass index is 22 87 kg/m²        PHYSICAL EXAM:      General Appearance:   Alert, cooperative, no distress   HEENT:   Normocephalic, atraumatic, anicteric      Neck:  Supple, symmetrical, trachea midline   Lungs:   Clear to auscultation bilaterally; no rales, rhonchi or wheezing; respirations unlabored    Heart[de-identified]   Regular rate and rhythm; no murmur, rub, or gallop  Abdomen:   Soft, non-tender, non-distended; normal bowel sounds; no masses, no organomegaly    Genitalia:   Deferred    Rectal:   Deferred    Extremities:  No cyanosis, clubbing or edema    Pulses:  2+ and symmetric    Skin:  No jaundice, rashes, or lesions    Lymph nodes:  No palpable cervical lymphadenopathy        Lab Results:   No visits with results within 1 Day(s) from this visit  Latest known visit with results is:   Appointment on 09/04/2019   Component Date Value    TSH 3RD GENERATON 09/04/2019 0 916          Radiology Results:   Dxa Bone Density Spine Hip And Pelvis    Result Date: 9/17/2019  Narrative: DXA SCAN CLINICAL HISTORY:  28-year-old woman  Menopause at age 62  OTHER RISK FACTORS:  Chronic poor mobility due to prior spinal surgery  TECHNIQUE: Bone densitometry was performed using a Holo[employer] Horizon C bone densitometer  Regions of interest appear properly placed  COMPARISON: 5/3/2017  RESULTS: LUMBAR SPINE L1-L2 (L3, L4): BMD  0 891  gm/cm2 / T-score -0 8 / Z score 1 8  (Lumbar levels within parentheses represent vertebrae excluded from analysis due to local structural abnormalities or artifact)  LEFT  TOTAL HIP: BMD 0 624  gm/cm2 / T-score -2 6 / Z score -0 4 LEFT  FEMORAL NECK: BMD 0 534  gm/cm2 / T score -2 8 / Z score -0 4 CHANGE: Since the last DXA: LUMBAR SPINE BMD has DECREASED  0 090 gm/cm2 or 9 2%  This change is statistically significant  HIP BMD has DECREASED  0 110 gm/cm2 or 15 0%  This change is statistically significant  Impression: 1  Osteoporosis  2   Since a DXA study from 5/3/2017,  there has been a STATISTICALLY SIGNIFICANT DECREASE in bone mineral density  in the lumbar spine and hip   3   The 10 year risk of hip fracture is 8 1% with the 10 year risk of major osteoporotic fracture being 20% as calculated by the [facility]/WHO fracture risk assessment tool (FRAX)  4   The current NOF guidelines recommend treating patients with a T-score of -2 5 or less in the lumbar spine or hips, or in post-menopausal women and men over the age of 48 with low bone mass (osteopenia) and a FRAX 10 year risk score of >3% for hip fracture and/or >20% for major osteoporotic fracture  5   The NOF recommends follow-up DXA in 1-2 years after initiating therapy for osteoporosis and every 2 years thereafter  More frequent evaluation is appropriate for patients with conditions associated with rapid bone loss, such as glucocorticoid therapy  The interval between DXA screenings may be longer for individuals without major risk factors and initial T-score in the normal or upper low bone mass range  The FRAX algorithm has certain limitations: -FRAX has not been validated in patients currently or previously treated with pharmacotherapy for osteoporosis  In such patients, clinical judgment must be exercised in interpreting FRAX scores  -Prior hip, vertebral and humeral fragility fractures appear to confer greater risk of subsequent fracture than fractures at other sites (this is especially true for individuals with severe vertebral fractures), but quantification of this incremental risk is not possible with FRAX  -FRAX underestimates fracture risk in patients with history of multiple fragility fractures  -FRAX may underestimate fracture risk in patients with history of frequent falls  -It is not appropriate to use FRAX to monitor treatment response  WHO CLASSIFICATION: Normal (a T-score of -1 0 or higher) Low bone mineral density (a T-score of less than -1 0 but higher than -2 5) Osteoporosis (a T-score of -2 5 or less) Severe osteoporosis (a T-score of -2 5 or less with a fragility fracture) LEAST SIGNIFICANT CHANGE (AT 95% C  I): Lumbar spine: 0 034 g/cm2; 3 4% Total hip: 0 041 g/cm2; 4 5% Forearm: 0 025 g/cm2; 4 4% Workstation performed: [id number]

## 2019-10-18 ENCOUNTER — TELEPHONE (OUTPATIENT)
Dept: GASTROENTEROLOGY | Facility: CLINIC | Age: 84
End: 2019-10-18

## 2019-10-21 DIAGNOSIS — K59.00 CONSTIPATION, UNSPECIFIED CONSTIPATION TYPE: ICD-10-CM

## 2019-10-21 NOTE — TELEPHONE ENCOUNTER
Spoke to patient  She would like to see if the Amitiza as a 90 day prescription would save her money versus a 30 day prescription  Sent the prescription for 90 days to her pharmacy

## 2019-11-06 DIAGNOSIS — I10 HYPERTENSION, UNSPECIFIED TYPE: ICD-10-CM

## 2019-11-06 DIAGNOSIS — E78.2 COMBINED HYPERLIPIDEMIA: ICD-10-CM

## 2019-11-06 RX ORDER — ATORVASTATIN CALCIUM 40 MG/1
40 TABLET, FILM COATED ORAL DAILY
Qty: 90 TABLET | Refills: 3 | Status: SHIPPED | OUTPATIENT
Start: 2019-11-06 | End: 2021-02-18 | Stop reason: SDUPTHER

## 2019-11-06 RX ORDER — ATENOLOL 25 MG/1
25 TABLET ORAL DAILY
Qty: 90 TABLET | Refills: 3 | Status: SHIPPED | OUTPATIENT
Start: 2019-11-06 | End: 2020-12-28 | Stop reason: SDUPTHER

## 2019-11-07 ENCOUNTER — TELEPHONE (OUTPATIENT)
Dept: GASTROENTEROLOGY | Facility: CLINIC | Age: 84
End: 2019-11-07

## 2019-11-07 NOTE — TELEPHONE ENCOUNTER
Patient reports she is having a BM right now only every third day on the Amitiza  No abdominal pain  No bleeding  No weight loss  She will add Miralax 1 cap daily in the meantime with the Amitiza as needed and call if any problems

## 2019-11-18 ENCOUNTER — TELEPHONE (OUTPATIENT)
Dept: GASTROENTEROLOGY | Facility: CLINIC | Age: 84
End: 2019-11-18

## 2019-11-18 NOTE — TELEPHONE ENCOUNTER
Spoke to patient  She has been taking the Amitiza but not with the Miralax consistently as previously discussed  She will start taking the Amitiza and Miralax BID  She will also try a suppository  She did have a BM this morning  I also mentioned the recommendation for a colonoscopy as her last one was over 5 years ago if she continues to struggle with constipation despite the medications  Could also consider switching Amitiza to Motegrity  She will call if not improving to schedule colonoscopy

## 2019-11-20 ENCOUNTER — OFFICE VISIT (OUTPATIENT)
Dept: INTERNAL MEDICINE CLINIC | Facility: CLINIC | Age: 84
End: 2019-11-20
Payer: MEDICARE

## 2019-11-20 VITALS
OXYGEN SATURATION: 97 % | SYSTOLIC BLOOD PRESSURE: 130 MMHG | HEIGHT: 67 IN | DIASTOLIC BLOOD PRESSURE: 80 MMHG | BODY MASS INDEX: 22.76 KG/M2 | WEIGHT: 145 LBS | HEART RATE: 73 BPM

## 2019-11-20 DIAGNOSIS — E78.2 COMBINED HYPERLIPIDEMIA: ICD-10-CM

## 2019-11-20 DIAGNOSIS — R31.29 MICROSCOPIC HEMATURIA: ICD-10-CM

## 2019-11-20 DIAGNOSIS — Z12.39 BREAST SCREENING: ICD-10-CM

## 2019-11-20 DIAGNOSIS — M81.0 AGE-RELATED OSTEOPOROSIS WITHOUT CURRENT PATHOLOGICAL FRACTURE: ICD-10-CM

## 2019-11-20 DIAGNOSIS — R63.4 WEIGHT LOSS: ICD-10-CM

## 2019-11-20 DIAGNOSIS — I65.23 BILATERAL CAROTID ARTERY STENOSIS: ICD-10-CM

## 2019-11-20 DIAGNOSIS — I10 HYPERTENSION, ESSENTIAL: Primary | ICD-10-CM

## 2019-11-20 PROBLEM — Z00.00 HEALTH CARE MAINTENANCE: Status: ACTIVE | Noted: 2019-11-20

## 2019-11-20 PROCEDURE — G0439 PPPS, SUBSEQ VISIT: HCPCS | Performed by: INTERNAL MEDICINE

## 2019-11-20 PROCEDURE — 99214 OFFICE O/P EST MOD 30 MIN: CPT | Performed by: INTERNAL MEDICINE

## 2019-11-20 NOTE — PATIENT INSTRUCTIONS
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

## 2019-11-20 NOTE — PROGRESS NOTES
Assessment and Plan:     Problem List Items Addressed This Visit     None           Preventive health issues were discussed with patient, and age appropriate screening tests were ordered as noted in patient's After Visit Summary  Personalized health advice and appropriate referrals for health education or preventive services given if needed, as noted in patient's After Visit Summary       History of Present Illness:     Patient presents for Medicare Annual Wellness visit    Patient Care Team:  Hermann Killian MD as PCP - General  Eneida Henson, YANICK Powell MD     Problem List:     Patient Active Problem List   Diagnosis    Arthritis    Hypertension, essential    Combined hyperlipidemia    Pre-operative cardiovascular examination    Microscopic hematuria    Cardiac arrhythmia    Other impaction of intestine ([address] )    Anemia    Recurrent urinary tract infection    Urinary retention    Bladder diverticulum    Bladder prolapse, female, acquired    Slow transit constipation    Age-related osteoporosis without current pathological fracture      Past Medical and Surgical History:     Past Medical History:   Diagnosis Date    Alopecia     Cardiac arrhythmia     Compression fracture of L1 lumbar vertebra [address] [address]     resolved 09/2014    Occlusion of carotid artery     unspecified laterality resolved 08/05/2016    Sebaceous cyst      Past Surgical History:   Procedure Laterality Date    APPENDECTOMY      BACK SURGERY      COLONOSCOPY      resolved 2009    ESOPHAGOGASTRODUODENOSCOPY      mild gastritis resolved 2009    THROMBOENDARTERECTOMY Left     carotid, resloved 12/2012    TONSILLECTOMY        Family History:     Family History   Problem Relation Age of Onset    No Known Problems Mother       Social History:     Social History     Socioeconomic History    Marital status: /Civil Union     Spouse name: Not on file    Number of children: Not on file    Years of education: Not on file    Highest education level: Not on file   Occupational History    Not on file   Social Needs    Financial resource strain: Not on file    Food insecurity:     Worry: Not on file     Inability: Not on file    Transportation needs:     Medical: Not on file     Non-medical: Not on file   Tobacco Use    Smoking status: Never Smoker    Smokeless tobacco: Never Used   Substance and Sexual Activity    Alcohol use: Not Currently     Comment: rarely (history); socially    Drug use: No    Sexual activity: Never   Lifestyle    Physical activity:     Days per week: 0 days     Minutes per session: 0 min    Stress: Not at all   Relationships    Social connections:     Talks on phone: Not on file     Gets together: Not on file     Attends Sabianist service: Not on file     Active member of club or organization: Not on file     Attends meetings of clubs or organizations: Not on file     Relationship status: Not on file    Intimate partner violence:     Fear of current or ex partner: Not on file     Emotionally abused: Not on file     Physically abused: Not on file     Forced sexual activity: Not on file   Other Topics Concern    Not on file   Social History Narrative    Not on file       Medications and Allergies:     Current Outpatient Medications   Medication Sig Dispense Refill    alendronate (FOSAMAX) 35 mg tablet Take 1 tablet (35 mg total) by mouth every 7 days 12 tablet 3    ALPRAZolam (XANAX) 0 5 mg tablet alprazolam 0 5 mg tablet      atenolol (TENORMIN) 25 mg tablet Take 1 tablet (25 mg total) by mouth daily 90 tablet 3    atorvastatin (LIPITOR) 40 mg tablet Take 1 tablet (40 mg total) by mouth daily 90 tablet 3    Calcium Carbonate-Vitamin D3 600-400 MG-UNIT TABS 1 tab daily      cephalexin (KEFLEX) 500 mg capsule cephalexin 500 mg capsule      estradiol (ESTRACE VAGINAL) 0 1 mg/g vaginal cream Estrace 0 01% (0 1 mg/gram) vaginal cream      ibuprofen (MOTRIN) 600 mg tablet ibuprofen 600 mg tablet      lisinopril (ZESTRIL) 10 mg tablet lisinopril 10 mg tablet      lisinopril (ZESTRIL) 5 mg tablet lisinopril 5 mg tablet      lubiprostone (AMITIZA) 24 mcg capsule Take 1 capsule (24 mcg total) by mouth 2 (two) times a day with meals 180 capsule 3    meloxicam (MOBIC) 15 mg tablet meloxicam 15 mg tablet      meloxicam (MOBIC) 7 5 mg tablet meloxicam 7 5 mg tablet      methocarbamol (ROBAXIN) 750 mg tablet methocarbamol 750 mg tablet      Mirabegron ER (MYRBETRIQ) 25 MG TB24 Myrbetriq 25 mg tablet,extended release      multivitamin (THERAGRAN) TABS Take 1 tablet by mouth      naproxen (NAPROSYN) 375 mg tablet naproxen 375 mg tablet      nitrofurantoin (MACROBID) 100 mg capsule 1 CAPSULE DAILY 90 capsule 3    nystatin-triamcinolone (MYCOLOG-II) ointment apply to affected area twice a day      ondansetron (ZOFRAN-ODT) 4 mg disintegrating tablet ondansetron 4 mg disintegrating tablet      Sod Fluoride-Potassium Nitrate (PREVIDENT 5000 SENSITIVE) 1 1-5 % PSTE PreviDent 5000 Sensitive 1 1 %-5 % dental paste      sulfamethoxazole-trimethoprim (BACTRIM DS) 800-160 mg per tablet sulfamethoxazole 800 mg-trimethoprim 160 mg tablet      trospium chloride (SANCTURA) 20 mg tablet trospium 20 mg tablet       No current facility-administered medications for this visit  Allergies   Allergen Reactions    Myrbetriq [Mirabegron] GI Intolerance and Hives    Latex Hives and Swelling    Penicillins       Immunizations:     Immunization History   Administered Date(s) Administered    Td (adult), adsorbed 1936      Health Maintenance: There are no preventive care reminders to display for this patient  Topic Date Due    DTaP,Tdap,and Td Vaccines (1 - Tdap) 08/24/1946    Pneumococcal Vaccine: 65+ Years (1 of 2 - PCV13) 08/24/2001    Influenza Vaccine  07/01/2019      Medicare Health Risk Assessment:     There were no vitals taken for this visit       Emily Clemente is here for her Subsequent Wellness visit  Health Risk Assessment:   Patient rates overall health as fair  Patient feels that their physical health rating is same  Eyesight was rated as slightly worse  Hearing was rated as same  Patient feels that their emotional and mental health rating is slightly worse  Pain experienced in the last 7 days has been a lot  Patient's pain rating has been 8/10  Depression Screening:   PHQ-2 Score: 1      Fall Risk Screening: In the past year, patient has experienced: no history of falling in past year      Urinary Incontinence Screening:   Patient has leaked urine accidently in the last six months  Home Safety:  Patient does not have trouble with stairs inside or outside of their home  Patient has working smoke alarms and has working carbon monoxide detector  Home safety hazards include: none  Nutrition:   Current diet is Regular and No Added Salt  Medications:   Patient is currently taking over-the-counter supplements  OTC medications include: see medication list  Patient is able to manage medications  Activities of Daily Living (ADLs)/Instrumental Activities of Daily Living (IADLs):   Walk and transfer into and out of bed and chair?: Yes  Dress and groom yourself?: Yes    Bathe or shower yourself?: Yes    Feed yourself? Yes  Do your laundry/housekeeping?: No  Manage your money, pay your bills and track your expenses?: Yes  Make your own meals?: Yes    Do your own shopping?: No    Previous Hospitalizations:   Any hospitalizations or ED visits within the last 12 months?: No      Advance Care Planning:   Living will: Yes    Durable POA for healthcare:  Yes    Advanced directive: Yes    Advanced directive counseling given: Yes    Five wishes given: Yes    End of Life Decisions reviewed with patient: Yes    Provider agrees with end of life decisions: Yes      Cognitive Screening:   Provider or family/friend/caregiver concerned regarding cognition?: No    PREVENTIVE SCREENINGS      Cardiovascular Screening:    General: Screening Not Indicated and History Lipid Disorder      Diabetes Screening:     General: Screening Current      Cervical Cancer Screening:    General: Screening Not Indicated      Osteoporosis Screening:    General: Screening Not Indicated and History Osteoporosis      Abdominal Aortic Aneurysm (AAA) Screening:        General: Screening Current      Lung Cancer Screening:     General: Screening Not Indicated      Hepatitis C Screening:    General: Screening Not Indicated      Ella Agee MD

## 2019-11-20 NOTE — PROGRESS NOTES
Assessment/Plan:       Diagnoses and all orders for this visit:    Hypertension, essential  -     CBC and differential; Future  -     Lipid Panel with Direct LDL reflex; Future  -     Comprehensive metabolic panel; Future  -     TSH, 3rd generation with Free T4 reflex; Future  -     UA (URINE) with reflex to Scope    Age-related osteoporosis without current pathological fracture  -     Cancel: DXA bone density spine hip and pelvis; Future  -     Vitamin D 25 hydroxy; Future    Combined hyperlipidemia  -     CBC and differential; Future  -     Lipid Panel with Direct LDL reflex; Future  -     Comprehensive metabolic panel; Future  -     TSH, 3rd generation with Free T4 reflex; Future  -     UA (URINE) with reflex to Scope    Weight loss  -     TSH, 3rd generation with Free T4 reflex; Future  -     CT chest abdomen pelvis w contrast; Future    Breast screening  -     Mammo screening bilateral w cad; Future    Microscopic hematuria  -     CT chest abdomen pelvis w contrast; Future    Bilateral carotid artery stenosis  -     VAS carotid complete study; Future                Subjective:      Patient ID: Anila Ortiz is a 81 y o  female  An 55-year-old here for a wellness visit but also has some ongoing problems    She has chronic constipation and has follow-up with Gastroenterology  Currently, she is taking Amitiza with apparent at least acceptable results  Prior to that, she had failed Colace and Linzess  However, she had a Hemoccult done about a year sgo  This was reported to be normal  Then, Hemoccult was repeated in August of this year  This too was normal     Hypertension is treated     Osteoporosis noted on bone density test of 2017 and this is being treated  Repeat bone density done a few months ago    Multiple aches and pains; this appears to be reducible to a fall with a lot of posttraumatic arthropathy sustained about 4 years ago  This also included lumbar pain    She had lumbar spinal stenosis and has had a lumbar surgery which did not alleviate the pain  The patient has failed back syndrome  She had a left rotator cuff repair surgery done  However, she still has diffuse arthritic pain involving multiple joints and also back pain  She wears a back brace    Follows with Dr Liang Templeton for urinary incontinence  Currently not taking any medication  A pessary was placed to try to alleviate this  It is helpful although not a magic cure  Noted to have sustained a slow weight loss over the past 2 years; down about 15 lb  No abdominal pain, no cough, no nausea or vomiting or diarrhea  Patient does report some appetite loss  CT of chest abdomen and pelvis done about 1 year ago was normal with the exception of a slightly thivkened endometrium  Carotid disease: remote left CEA  Last carotid study was done 4 years ago  Reported less than 50% occlusion of the right side and 50-69% occlusion of the left  No symptoms referable to this  Taking a statin plus aspirin    Refuses vaccines  No recent mammogram      The following portions of the patient's history were reviewed and updated as appropriate:   She has a past medical history of Alopecia, Cardiac arrhythmia, Compression fracture of L1 lumbar vertebra ([address] ), and Occlusion of carotid artery  ,  does not have any pertinent problems on file  ,   has a past surgical history that includes Appendectomy; Thromboendarterectomy (Left); Colonoscopy; Esophagogastroduodenoscopy; Tonsillectomy; and Back surgery  ,  family history includes No Known Problems in her mother  ,   reports that she has never smoked  She has never used smokeless tobacco  She reports that she drank alcohol  She reports that she does not use drugs  ,  is allergic to myrbetriq [mirabegron]; latex; and penicillins     Current Outpatient Medications   Medication Sig Dispense Refill    alendronate (FOSAMAX) 35 mg tablet Take 1 tablet (35 mg total) by mouth every 7 days 12 tablet 3    atenolol (TENORMIN) 25 mg tablet Take 1 tablet (25 mg total) by mouth daily 90 tablet 3    atorvastatin (LIPITOR) 40 mg tablet Take 1 tablet (40 mg total) by mouth daily 90 tablet 3    Calcium Carbonate-Vitamin D3 600-400 MG-UNIT TABS 1 tab daily      estradiol (ESTRACE VAGINAL) 0 1 mg/g vaginal cream       lubiprostone (AMITIZA) 24 mcg capsule Take 1 capsule (24 mcg total) by mouth 2 (two) times a day with meals 180 capsule 3    multivitamin (THERAGRAN) TABS Take 1 tablet by mouth      nystatin-triamcinolone (MYCOLOG-II) ointment        No current facility-administered medications for this visit  Review of Systems   Constitutional: Negative for fatigue and fever  Respiratory: Negative for chest tightness and shortness of breath  Cardiovascular: Negative for chest pain  Gastrointestinal: Positive for abdominal pain and constipation  Negative for blood in stool, diarrhea, nausea and vomiting  Genitourinary: Positive for urgency  Musculoskeletal: Positive for arthralgias and back pain  Skin: Negative for rash  Psychiatric/Behavioral: Negative for dysphoric mood  The patient is not nervous/anxious  Objective:  Vitals:    11/20/19 1031   BP: 130/80   Pulse: 73   SpO2: 97%      Physical Exam   Constitutional:   Eldery alert female patient who appears to be stated age  Walks slowly using a cane for ambulatory support  Eyes: No scleral icterus  Cardiovascular: Normal rate  Pulmonary/Chest: Effort normal  She has no wheezes  She has no rales  Abdominal: Soft  There is no tenderness  Musculoskeletal: Normal range of motion  She exhibits deformity  Moderately kyphotic  Diffuse osteoarthrosis deformities  Synovial thickening of the knees  Lymphadenopathy:     She has no cervical adenopathy  Neurological: She is alert  Skin: Skin is warm  Psychiatric: She has a normal mood and affect   Judgment normal          Patient Instructions    A patient his major functional issue is diffuse pain   She has osteoarthritis  She has lumbar spinal stenosis with failed back syndrome  She had a history of a fall which really worsen this problem  Gradual weight loss over the port AST urine a half  CT scan of chest abdomen pelvis done 1 year ago was unremarkable with the exception of slight thickening of the endometrium  This can be repeated to look for occult malignancy although she does not have the malaise and other abnormal findings 1 would expect     Mammogram should be done    Carotid study should be done    Laboratory testing should be done today  This will include vitamin-D level due to the osteoporosis    Follow-up here in about 1 month to review these results

## 2019-11-22 ENCOUNTER — HOSPITAL ENCOUNTER (OUTPATIENT)
Dept: MAMMOGRAPHY | Facility: CLINIC | Age: 84
Discharge: HOME | End: 2019-11-22
Payer: MEDICARE

## 2019-11-22 ENCOUNTER — APPOINTMENT (OUTPATIENT)
Dept: LAB | Facility: CLINIC | Age: 84
End: 2019-11-22
Payer: MEDICARE

## 2019-11-22 VITALS — BODY MASS INDEX: 22.76 KG/M2 | WEIGHT: 145 LBS | HEIGHT: 67 IN

## 2019-11-22 DIAGNOSIS — E78.2 COMBINED HYPERLIPIDEMIA: ICD-10-CM

## 2019-11-22 DIAGNOSIS — I10 HYPERTENSION, ESSENTIAL: ICD-10-CM

## 2019-11-22 DIAGNOSIS — R63.4 WEIGHT LOSS: ICD-10-CM

## 2019-11-22 DIAGNOSIS — M81.0 AGE-RELATED OSTEOPOROSIS WITHOUT CURRENT PATHOLOGICAL FRACTURE: ICD-10-CM

## 2019-11-22 DIAGNOSIS — Z12.39 BREAST SCREENING: ICD-10-CM

## 2019-11-22 LAB
25(OH)D3 SERPL-MCNC: 37.6 NG/ML (ref 30–100)
ALBUMIN SERPL BCP-MCNC: 3.5 G/DL (ref 3.5–5)
ALP SERPL-CCNC: 111 U/L (ref 46–116)
ALT SERPL W P-5'-P-CCNC: 18 U/L (ref 12–78)
ANION GAP SERPL CALCULATED.3IONS-SCNC: 6 MMOL/L (ref 4–13)
AST SERPL W P-5'-P-CCNC: 13 U/L (ref 5–45)
BACTERIA UR QL AUTO: ABNORMAL /HPF
BASOPHILS # BLD AUTO: 0.03 THOUSANDS/ΜL (ref 0–0.1)
BASOPHILS NFR BLD AUTO: 1 % (ref 0–1)
BILIRUB SERPL-MCNC: 0.39 MG/DL (ref 0.2–1)
BILIRUB UR QL STRIP: NEGATIVE
BUN SERPL-MCNC: 18 MG/DL (ref 5–25)
CALCIUM ALBUM COR SERPL-MCNC: 10.7 MG/DL (ref 8.3–10.1)
CALCIUM SERPL-MCNC: 10.3 MG/DL (ref 8.3–10.1)
CAOX CRY URNS QL MICRO: ABNORMAL /HPF
CHLORIDE SERPL-SCNC: 106 MMOL/L (ref 100–108)
CHOLEST SERPL-MCNC: 165 MG/DL (ref 50–200)
CLARITY UR: ABNORMAL
CO2 SERPL-SCNC: 25 MMOL/L (ref 21–32)
COLOR UR: YELLOW
CREAT SERPL-MCNC: 0.99 MG/DL (ref 0.6–1.3)
EOSINOPHIL # BLD AUTO: 0.06 THOUSAND/ΜL (ref 0–0.61)
EOSINOPHIL NFR BLD AUTO: 2 % (ref 0–6)
ERYTHROCYTE [DISTWIDTH] IN BLOOD BY AUTOMATED COUNT: 12.6 % (ref 11.6–15.1)
GFR SERPL CREATININE-BSD FRML MDRD: 53 ML/MIN/1.73SQ M
GLUCOSE P FAST SERPL-MCNC: 119 MG/DL (ref 65–99)
GLUCOSE UR STRIP-MCNC: NEGATIVE MG/DL
HCT VFR BLD AUTO: 36.1 % (ref 34.8–46.1)
HDLC SERPL-MCNC: 73 MG/DL
HGB BLD-MCNC: 11.5 G/DL (ref 11.5–15.4)
HGB UR QL STRIP.AUTO: NEGATIVE
IMM GRANULOCYTES # BLD AUTO: 0 THOUSAND/UL (ref 0–0.2)
IMM GRANULOCYTES NFR BLD AUTO: 0 % (ref 0–2)
KETONES UR STRIP-MCNC: NEGATIVE MG/DL
LDLC SERPL CALC-MCNC: 79 MG/DL (ref 0–100)
LEUKOCYTE ESTERASE UR QL STRIP: ABNORMAL
LYMPHOCYTES # BLD AUTO: 0.76 THOUSANDS/ΜL (ref 0.6–4.47)
LYMPHOCYTES NFR BLD AUTO: 21 % (ref 14–44)
MCH RBC QN AUTO: 30 PG (ref 26.8–34.3)
MCHC RBC AUTO-ENTMCNC: 31.9 G/DL (ref 31.4–37.4)
MCV RBC AUTO: 94 FL (ref 82–98)
MONOCYTES # BLD AUTO: 0.34 THOUSAND/ΜL (ref 0.17–1.22)
MONOCYTES NFR BLD AUTO: 9 % (ref 4–12)
NEUTROPHILS # BLD AUTO: 2.52 THOUSANDS/ΜL (ref 1.85–7.62)
NEUTS SEG NFR BLD AUTO: 67 % (ref 43–75)
NITRITE UR QL STRIP: POSITIVE
NON-SQ EPI CELLS URNS QL MICRO: ABNORMAL /HPF
NRBC BLD AUTO-RTO: 0 /100 WBCS
PH UR STRIP.AUTO: 6 [PH]
PLATELET # BLD AUTO: 181 THOUSANDS/UL (ref 149–390)
PMV BLD AUTO: 10.8 FL (ref 8.9–12.7)
POTASSIUM SERPL-SCNC: 4.4 MMOL/L (ref 3.5–5.3)
PROT SERPL-MCNC: 6.5 G/DL (ref 6.4–8.2)
PROT UR STRIP-MCNC: NEGATIVE MG/DL
RBC # BLD AUTO: 3.83 MILLION/UL (ref 3.81–5.12)
RBC #/AREA URNS AUTO: ABNORMAL /HPF
SODIUM SERPL-SCNC: 137 MMOL/L (ref 136–145)
SP GR UR STRIP.AUTO: 1.01 (ref 1–1.03)
TRIGL SERPL-MCNC: 64 MG/DL
TSH SERPL DL<=0.05 MIU/L-ACNC: 1.59 UIU/ML (ref 0.36–3.74)
UROBILINOGEN UR QL STRIP.AUTO: 0.2 E.U./DL
WBC # BLD AUTO: 3.71 THOUSAND/UL (ref 4.31–10.16)
WBC #/AREA URNS AUTO: ABNORMAL /HPF

## 2019-11-22 PROCEDURE — 85025 COMPLETE CBC W/AUTO DIFF WBC: CPT

## 2019-11-22 PROCEDURE — 80061 LIPID PANEL: CPT

## 2019-11-22 PROCEDURE — 80053 COMPREHEN METABOLIC PANEL: CPT

## 2019-11-22 PROCEDURE — 77063 BREAST TOMOSYNTHESIS BI: CPT

## 2019-11-22 PROCEDURE — 82306 VITAMIN D 25 HYDROXY: CPT

## 2019-11-22 PROCEDURE — 77067 SCR MAMMO BI INCL CAD: CPT

## 2019-11-22 PROCEDURE — 36415 COLL VENOUS BLD VENIPUNCTURE: CPT

## 2019-11-22 PROCEDURE — 84443 ASSAY THYROID STIM HORMONE: CPT

## 2019-11-22 PROCEDURE — 81001 URINALYSIS AUTO W/SCOPE: CPT | Performed by: INTERNAL MEDICINE

## 2019-11-25 ENCOUNTER — TELEPHONE (OUTPATIENT)
Dept: INTERNAL MEDICINE CLINIC | Facility: CLINIC | Age: 84
End: 2019-11-25

## 2019-11-25 DIAGNOSIS — N39.0 RECURRENT URINARY TRACT INFECTION: Primary | ICD-10-CM

## 2019-11-25 NOTE — TELEPHONE ENCOUNTER
----- Message from Gi Lott PA-C sent at 11/25/2019  5:38 PM EST -----  I ordered a urine culture on her please call her

## 2019-11-25 NOTE — TELEPHONE ENCOUNTER
Pt called she saw Dr Johnathan Peña on 11/20/19, she said he ordered for her to get a bone density test  She said the insurance will not pay for it because she did have one done on Aug 26 19  I do not see in her chart that a bone density was ordered     Please call patient [phone]

## 2019-11-26 NOTE — TELEPHONE ENCOUNTER
PT  CALLED BACK AND WAS NOTIFIED AND STATED SHE DID THIS 11/22/ 19, AND THIS IS THE SECOND CALL SHE RECEIVED ABOUT THIS-PLEASE ADVISE

## 2019-11-27 ENCOUNTER — TELEPHONE (OUTPATIENT)
Dept: INTERNAL MEDICINE CLINIC | Facility: CLINIC | Age: 84
End: 2019-11-27

## 2019-11-27 ENCOUNTER — HOSPITAL ENCOUNTER (OUTPATIENT)
Dept: CT IMAGING | Facility: CLINIC | Age: 84
Discharge: HOME | End: 2019-11-27

## 2019-11-27 DIAGNOSIS — R31.29 MICROSCOPIC HEMATURIA: ICD-10-CM

## 2019-11-27 DIAGNOSIS — R63.4 WEIGHT LOSS: ICD-10-CM

## 2019-11-27 NOTE — TELEPHONE ENCOUNTER
YENIFER Baron from [address] called stating pt arrived for CT chest abd and pelvis unaware she was to take barium, pt stated no one told her  Pt unwilling to take barium now and wait 90 minutes, will reschedule

## 2019-12-02 ENCOUNTER — APPOINTMENT (OUTPATIENT)
Dept: LAB | Facility: CLINIC | Age: 84
End: 2019-12-02
Payer: MEDICARE

## 2019-12-02 DIAGNOSIS — N39.0 RECURRENT URINARY TRACT INFECTION: ICD-10-CM

## 2019-12-02 PROCEDURE — 87077 CULTURE AEROBIC IDENTIFY: CPT

## 2019-12-02 PROCEDURE — 87086 URINE CULTURE/COLONY COUNT: CPT

## 2019-12-02 PROCEDURE — 87186 SC STD MICRODIL/AGAR DIL: CPT

## 2019-12-03 ENCOUNTER — TELEPHONE (OUTPATIENT)
Dept: INTERNAL MEDICINE CLINIC | Facility: CLINIC | Age: 84
End: 2019-12-03

## 2019-12-04 ENCOUNTER — TELEPHONE (OUTPATIENT)
Dept: GASTROENTEROLOGY | Facility: CLINIC | Age: 84
End: 2019-12-04

## 2019-12-04 DIAGNOSIS — N39.0 RECURRENT URINARY TRACT INFECTION: Primary | ICD-10-CM

## 2019-12-04 LAB — BACTERIA UR CULT: ABNORMAL

## 2019-12-04 RX ORDER — CIPROFLOXACIN 250 MG/1
250 TABLET, FILM COATED ORAL EVERY 12 HOURS SCHEDULED
Qty: 10 TABLET | Refills: 0 | Status: SHIPPED | OUTPATIENT
Start: 2019-12-04 | End: 2019-12-09

## 2019-12-04 NOTE — TELEPHONE ENCOUNTER
Spoke to patient  She is still having ongoing problems with her bowel movements and reports additional weight loss  She is going for a CT A/P tomorrow  I also again discussed the recommendation for her to undergo a colonoscopy to rule out an underlying malignancy  She expressed understanding and will call back after the CT Scan to schedule

## 2019-12-05 ENCOUNTER — HOSPITAL ENCOUNTER (OUTPATIENT)
Dept: CT IMAGING | Facility: CLINIC | Age: 84
Discharge: HOME | End: 2019-12-05
Payer: MEDICARE

## 2019-12-05 ENCOUNTER — TELEPHONE (OUTPATIENT)
Dept: INTERNAL MEDICINE CLINIC | Facility: CLINIC | Age: 84
End: 2019-12-05

## 2019-12-05 ENCOUNTER — HOSPITAL ENCOUNTER (OUTPATIENT)
Dept: ULTRASOUND IMAGING | Facility: CLINIC | Age: 84
Discharge: HOME | End: 2019-12-05
Attending: INTERNAL MEDICINE
Payer: MEDICARE

## 2019-12-05 DIAGNOSIS — I65.23 BILATERAL CAROTID ARTERY STENOSIS: ICD-10-CM

## 2019-12-05 PROCEDURE — 93880 EXTRACRANIAL BILAT STUDY: CPT

## 2019-12-05 PROCEDURE — 93880 EXTRACRANIAL BILAT STUDY: CPT | Performed by: SURGERY

## 2019-12-05 PROCEDURE — 71260 CT THORAX DX C+: CPT

## 2019-12-05 PROCEDURE — 74177 CT ABD & PELVIS W/CONTRAST: CPT

## 2019-12-05 RX ADMIN — IOHEXOL 100 ML: 350 INJECTION, SOLUTION INTRAVENOUS at 12:44

## 2019-12-05 NOTE — TELEPHONE ENCOUNTER
PATIENT WENT FOR A CT SCAN AT [facility] EARLY THIS AFTERNOON  HAD TO DRINK THE SOLUTION   PATIENT HAS HAD THE DIARRHEA SINCE HAVING THE TEST DONE  PLEASE ADVISE WHAT SHE SHOULD DO   HER #   [phone]

## 2019-12-06 ENCOUNTER — TELEPHONE (OUTPATIENT)
Dept: INTERNAL MEDICINE CLINIC | Facility: CLINIC | Age: 84
End: 2019-12-06

## 2019-12-06 DIAGNOSIS — E04.2 MULTIPLE THYROID NODULES: Primary | ICD-10-CM

## 2019-12-06 NOTE — TELEPHONE ENCOUNTER
PATIENT CALLED AND SAID THAT HE WAS VERY UPSET THAT NO ONE CALLED HER BACK REGARDING HER DIARRHEA ISSUE   SHE DID CALL URGENT CARE AND THEY TOLD HER TO TAKE [id number]  HER #   [phone]

## 2019-12-06 NOTE — TELEPHONE ENCOUNTER
----- Message from Renaldo Mcnulty MD sent at 12/6/2019 10:02 AM EST -----  Nothing suspicious on the chest and abdominal CT

## 2019-12-06 NOTE — TELEPHONE ENCOUNTER
----- Message from Kiana Vegas MD sent at 12/6/2019  9:34 AM EST -----  Please call the patient regarding her  Result  Carotid ultrasound showed obstruction less than 50% on the right and 50-70% left  Their recommendation is to recheck this in 6 months  We can discuss this on December 18th  However, it also shows thyroid nodules as an incidental finding  This requires a thyroid ultrasound  Put in order  Please tell her to call Central scheduling- give her the number

## 2019-12-06 NOTE — TELEPHONE ENCOUNTER
Patient was prescribed Ciprofoxacin (Cipro) 250 mg tablet by Jenn Mohan  She had a CT scan done w/barium on yesterday and had a bad reaction  She would like to know if she should start the Ciprofoxacin today or wait for another day before taking   Marianne Schuler [phone]

## 2019-12-09 ENCOUNTER — HOSPITAL ENCOUNTER (OUTPATIENT)
Dept: ULTRASOUND IMAGING | Facility: CLINIC | Age: 84
Discharge: HOME | End: 2019-12-09
Payer: MEDICARE

## 2019-12-09 DIAGNOSIS — E04.2 MULTIPLE THYROID NODULES: ICD-10-CM

## 2019-12-09 PROCEDURE — 76536 US EXAM OF HEAD AND NECK: CPT

## 2019-12-09 NOTE — TELEPHONE ENCOUNTER
Spoke to patient and discussed recommendation for colonoscopy due to ongoing BM irregularities and weight loss  Please schedule patient for colonoscopy

## 2019-12-11 ENCOUNTER — TELEPHONE (OUTPATIENT)
Dept: INTERNAL MEDICINE CLINIC | Facility: CLINIC | Age: 84
End: 2019-12-11

## 2020-01-14 ENCOUNTER — TELEPHONE (OUTPATIENT)
Dept: INTERNAL MEDICINE CLINIC | Facility: CLINIC | Age: 85
End: 2020-01-14

## 2020-01-14 ENCOUNTER — TELEPHONE (OUTPATIENT)
Dept: GASTROENTEROLOGY | Facility: CLINIC | Age: 85
End: 2020-01-14

## 2020-01-14 NOTE — TELEPHONE ENCOUNTER
PT  CALLED AND STATED SHE HAD CT DONE IN 12/2019 AND IS WONDERING IF THIS WOULD SHOW THE SPINE AS SHE HAS DISCOVERED A LUMP IN THE MIDDLE OF HER SPINE THE SIZE OF A QUARTER -PLEASE ADVISE

## 2020-01-16 NOTE — TELEPHONE ENCOUNTER
Patient called back she wants Emi to call her back  Patient was notified that a message was sent to Christine Branham

## 2020-01-17 ENCOUNTER — OFFICE VISIT (OUTPATIENT)
Dept: INTERNAL MEDICINE CLINIC | Facility: CLINIC | Age: 85
End: 2020-01-17
Payer: MEDICARE

## 2020-01-17 ENCOUNTER — TELEPHONE (OUTPATIENT)
Dept: OTHER | Facility: OTHER | Age: 85
End: 2020-01-17

## 2020-01-17 VITALS
WEIGHT: 145 LBS | OXYGEN SATURATION: 97 % | DIASTOLIC BLOOD PRESSURE: 82 MMHG | SYSTOLIC BLOOD PRESSURE: 140 MMHG | BODY MASS INDEX: 22.76 KG/M2 | HEIGHT: 67 IN | HEART RATE: 75 BPM

## 2020-01-17 DIAGNOSIS — M40.05 POSTURAL KYPHOSIS OF THORACOLUMBAR REGION: Primary | ICD-10-CM

## 2020-01-17 PROCEDURE — 99212 OFFICE O/P EST SF 10 MIN: CPT | Performed by: INTERNAL MEDICINE

## 2020-02-18 ENCOUNTER — TELEPHONE (OUTPATIENT)
Dept: GASTROENTEROLOGY | Facility: CLINIC | Age: 85
End: 2020-02-18

## 2020-02-19 ENCOUNTER — TELEPHONE (OUTPATIENT)
Dept: GASTROENTEROLOGY | Facility: CLINIC | Age: 85
End: 2020-02-19

## 2020-02-19 DIAGNOSIS — K59.09 OTHER CONSTIPATION: Primary | ICD-10-CM

## 2020-02-19 NOTE — TELEPHONE ENCOUNTER
Spoke to patient - she states her constipation is much better now  She cancelled/declined colonoscopy multiple times  She would like to do a FIT test   Had order mailed

## 2020-03-03 ENCOUNTER — TELEPHONE (OUTPATIENT)
Dept: GASTROENTEROLOGY | Facility: CLINIC | Age: 85
End: 2020-03-03

## 2020-03-03 ENCOUNTER — APPOINTMENT (OUTPATIENT)
Dept: LAB | Facility: HOSPITAL | Age: 85
End: 2020-03-03
Payer: MEDICARE

## 2020-03-03 DIAGNOSIS — K59.09 OTHER CONSTIPATION: ICD-10-CM

## 2020-03-03 LAB — HEMOCCULT STL QL IA: NEGATIVE

## 2020-03-03 PROCEDURE — G0328 FECAL BLOOD SCRN IMMUNOASSAY: HCPCS

## 2020-03-03 NOTE — TELEPHONE ENCOUNTER
----- Message from Mikie Pearl PA-C sent at 3/3/2020  2:06 PM EST -----  Please inform patient that the stool test was negative for blood

## 2020-03-12 ENCOUNTER — TELEPHONE (OUTPATIENT)
Dept: NEUROLOGY | Facility: CLINIC | Age: 85
End: 2020-03-12

## 2020-03-12 NOTE — TELEPHONE ENCOUNTER
Received a subpoena asking  For patient information to be sent to [employer] of the [place] of [address] to Dr Kalia Castillo included  Called [facility] spoke with Devi Craig she stated I should sent the check to [facility] to be processed      Sent to [id number] on 03/12/2020

## 2020-03-16 ENCOUNTER — TELEPHONE (OUTPATIENT)
Dept: INTERNAL MEDICINE CLINIC | Facility: CLINIC | Age: 85
End: 2020-03-16

## 2020-07-16 ENCOUNTER — HOSPITAL ENCOUNTER (INPATIENT)
Facility: HOSPITAL | Age: 85
LOS: 1 days | Discharge: HOME | DRG: 313 | End: 2020-07-17
Attending: EMERGENCY MEDICINE | Admitting: STUDENT IN AN ORGANIZED HEALTH CARE EDUCATION/TRAINING PROGRAM
Payer: MEDICARE

## 2020-07-16 ENCOUNTER — APPOINTMENT (EMERGENCY)
Dept: RADIOLOGY | Facility: HOSPITAL | Age: 85
DRG: 313 | End: 2020-07-16
Payer: MEDICARE

## 2020-07-16 DIAGNOSIS — R07.9 CHEST PAIN: Primary | ICD-10-CM

## 2020-07-16 LAB
ALBUMIN SERPL BCP-MCNC: 3.6 G/DL (ref 3.5–5)
ALP SERPL-CCNC: 111 U/L (ref 46–116)
ALT SERPL W P-5'-P-CCNC: 24 U/L (ref 12–78)
ANION GAP SERPL CALCULATED.3IONS-SCNC: 8 MMOL/L (ref 4–13)
AST SERPL W P-5'-P-CCNC: 23 U/L (ref 5–45)
BASOPHILS # BLD AUTO: 0.02 THOUSANDS/ΜL (ref 0–0.1)
BASOPHILS NFR BLD AUTO: 1 % (ref 0–1)
BILIRUB DIRECT SERPL-MCNC: 0.09 MG/DL (ref 0–0.2)
BILIRUB SERPL-MCNC: 0.3 MG/DL (ref 0.2–1)
BUN SERPL-MCNC: 26 MG/DL (ref 5–25)
CALCIUM SERPL-MCNC: 10.1 MG/DL (ref 8.3–10.1)
CHLORIDE SERPL-SCNC: 101 MMOL/L (ref 100–108)
CO2 SERPL-SCNC: 29 MMOL/L (ref 21–32)
CREAT SERPL-MCNC: 1.21 MG/DL (ref 0.6–1.3)
EOSINOPHIL # BLD AUTO: 0.03 THOUSAND/ΜL (ref 0–0.61)
EOSINOPHIL NFR BLD AUTO: 1 % (ref 0–6)
ERYTHROCYTE [DISTWIDTH] IN BLOOD BY AUTOMATED COUNT: 12.3 % (ref 11.6–15.1)
GFR SERPL CREATININE-BSD FRML MDRD: 41 ML/MIN/1.73SQ M
GLUCOSE SERPL-MCNC: 106 MG/DL (ref 65–140)
HCT VFR BLD AUTO: 35.5 % (ref 34.8–46.1)
HGB BLD-MCNC: 11.9 G/DL (ref 11.5–15.4)
IMM GRANULOCYTES # BLD AUTO: 0 THOUSAND/UL (ref 0–0.2)
IMM GRANULOCYTES NFR BLD AUTO: 0 % (ref 0–2)
INR PPP: 0.98 (ref 0.84–1.19)
LYMPHOCYTES # BLD AUTO: 0.95 THOUSANDS/ΜL (ref 0.6–4.47)
LYMPHOCYTES NFR BLD AUTO: 22 % (ref 14–44)
MAGNESIUM SERPL-MCNC: 1.8 MG/DL (ref 1.6–2.6)
MCH RBC QN AUTO: 31.2 PG (ref 26.8–34.3)
MCHC RBC AUTO-ENTMCNC: 33.5 G/DL (ref 31.4–37.4)
MCV RBC AUTO: 93 FL (ref 82–98)
MONOCYTES # BLD AUTO: 0.38 THOUSAND/ΜL (ref 0.17–1.22)
MONOCYTES NFR BLD AUTO: 9 % (ref 4–12)
NEUTROPHILS # BLD AUTO: 2.88 THOUSANDS/ΜL (ref 1.85–7.62)
NEUTS SEG NFR BLD AUTO: 67 % (ref 43–75)
NRBC BLD AUTO-RTO: 0 /100 WBCS
NT-PROBNP SERPL-MCNC: 476 PG/ML
PLATELET # BLD AUTO: 165 THOUSANDS/UL (ref 149–390)
PMV BLD AUTO: 10.5 FL (ref 8.9–12.7)
POTASSIUM SERPL-SCNC: 4.3 MMOL/L (ref 3.5–5.3)
PROT SERPL-MCNC: 6.8 G/DL (ref 6.4–8.2)
PROTHROMBIN TIME: 12.5 SECONDS (ref 11.6–14.5)
RBC # BLD AUTO: 3.81 MILLION/UL (ref 3.81–5.12)
SODIUM SERPL-SCNC: 138 MMOL/L (ref 136–145)
TROPONIN I SERPL-MCNC: <0.02 NG/ML
TSH SERPL DL<=0.05 MIU/L-ACNC: 1.24 UIU/ML (ref 0.36–3.74)
WBC # BLD AUTO: 4.26 THOUSAND/UL (ref 4.31–10.16)

## 2020-07-16 PROCEDURE — 83880 ASSAY OF NATRIURETIC PEPTIDE: CPT | Performed by: EMERGENCY MEDICINE

## 2020-07-16 PROCEDURE — 84443 ASSAY THYROID STIM HORMONE: CPT | Performed by: EMERGENCY MEDICINE

## 2020-07-16 PROCEDURE — 1124F ACP DISCUSS-NO DSCNMKR DOCD: CPT | Performed by: INTERNAL MEDICINE

## 2020-07-16 PROCEDURE — 71045 X-RAY EXAM CHEST 1 VIEW: CPT

## 2020-07-16 PROCEDURE — 93005 ELECTROCARDIOGRAM TRACING: CPT

## 2020-07-16 PROCEDURE — 36415 COLL VENOUS BLD VENIPUNCTURE: CPT | Performed by: EMERGENCY MEDICINE

## 2020-07-16 PROCEDURE — 99285 EMERGENCY DEPT VISIT HI MDM: CPT | Performed by: EMERGENCY MEDICINE

## 2020-07-16 PROCEDURE — 84484 ASSAY OF TROPONIN QUANT: CPT | Performed by: EMERGENCY MEDICINE

## 2020-07-16 PROCEDURE — 85025 COMPLETE CBC W/AUTO DIFF WBC: CPT | Performed by: EMERGENCY MEDICINE

## 2020-07-16 PROCEDURE — 83735 ASSAY OF MAGNESIUM: CPT | Performed by: EMERGENCY MEDICINE

## 2020-07-16 PROCEDURE — 80048 BASIC METABOLIC PNL TOTAL CA: CPT | Performed by: EMERGENCY MEDICINE

## 2020-07-16 PROCEDURE — 80076 HEPATIC FUNCTION PANEL: CPT | Performed by: EMERGENCY MEDICINE

## 2020-07-16 PROCEDURE — 99285 EMERGENCY DEPT VISIT HI MDM: CPT

## 2020-07-16 PROCEDURE — 85610 PROTHROMBIN TIME: CPT | Performed by: EMERGENCY MEDICINE

## 2020-07-17 VITALS
OXYGEN SATURATION: 96 % | BODY MASS INDEX: 24.46 KG/M2 | SYSTOLIC BLOOD PRESSURE: 129 MMHG | TEMPERATURE: 97.4 F | HEIGHT: 67 IN | RESPIRATION RATE: 17 BRPM | HEART RATE: 72 BPM | DIASTOLIC BLOOD PRESSURE: 65 MMHG | WEIGHT: 155.87 LBS

## 2020-07-17 DIAGNOSIS — R07.9 CHEST PAIN, UNSPECIFIED TYPE: Primary | ICD-10-CM

## 2020-07-17 PROBLEM — R07.89 CHEST PRESSURE: Status: ACTIVE | Noted: 2020-07-17

## 2020-07-17 LAB
ATRIAL RATE: 63 BPM
ATRIAL RATE: 64 BPM
ATRIAL RATE: 74 BPM
ATRIAL RATE: 76 BPM
CHOLEST SERPL-MCNC: 147 MG/DL (ref 50–200)
EST. AVERAGE GLUCOSE BLD GHB EST-MCNC: 114 MG/DL
HBA1C MFR BLD: 5.6 %
HDLC SERPL-MCNC: 73 MG/DL
LDLC SERPL CALC-MCNC: 69 MG/DL (ref 0–100)
P AXIS: 100 DEGREES
P AXIS: 66 DEGREES
P AXIS: 74 DEGREES
P AXIS: 79 DEGREES
PLATELET # BLD AUTO: 148 THOUSANDS/UL (ref 149–390)
PMV BLD AUTO: 10 FL (ref 8.9–12.7)
PR INTERVAL: 154 MS
PR INTERVAL: 158 MS
PR INTERVAL: 158 MS
PR INTERVAL: 160 MS
QRS AXIS: 60 DEGREES
QRS AXIS: 62 DEGREES
QRS AXIS: 78 DEGREES
QRS AXIS: 80 DEGREES
QRSD INTERVAL: 74 MS
QRSD INTERVAL: 74 MS
QRSD INTERVAL: 76 MS
QRSD INTERVAL: 84 MS
QT INTERVAL: 392 MS
QT INTERVAL: 418 MS
QT INTERVAL: 428 MS
QT INTERVAL: 442 MS
QTC INTERVAL: 435 MS
QTC INTERVAL: 441 MS
QTC INTERVAL: 452 MS
QTC INTERVAL: 470 MS
T WAVE AXIS: 24 DEGREES
T WAVE AXIS: 34 DEGREES
T WAVE AXIS: 55 DEGREES
T WAVE AXIS: 7 DEGREES
TRIGL SERPL-MCNC: 24 MG/DL
TROPONIN I SERPL-MCNC: 0.02 NG/ML
TROPONIN I SERPL-MCNC: <0.02 NG/ML
VENTRICULAR RATE: 63 BPM
VENTRICULAR RATE: 64 BPM
VENTRICULAR RATE: 74 BPM
VENTRICULAR RATE: 76 BPM

## 2020-07-17 PROCEDURE — 85049 AUTOMATED PLATELET COUNT: CPT | Performed by: PHYSICIAN ASSISTANT

## 2020-07-17 PROCEDURE — 99236 HOSP IP/OBS SAME DATE HI 85: CPT | Performed by: PHYSICIAN ASSISTANT

## 2020-07-17 PROCEDURE — 93005 ELECTROCARDIOGRAM TRACING: CPT

## 2020-07-17 PROCEDURE — 93010 ELECTROCARDIOGRAM REPORT: CPT | Performed by: INTERNAL MEDICINE

## 2020-07-17 PROCEDURE — 80061 LIPID PANEL: CPT | Performed by: PHYSICIAN ASSISTANT

## 2020-07-17 PROCEDURE — 84484 ASSAY OF TROPONIN QUANT: CPT | Performed by: PHYSICIAN ASSISTANT

## 2020-07-17 PROCEDURE — 99223 1ST HOSP IP/OBS HIGH 75: CPT | Performed by: INTERNAL MEDICINE

## 2020-07-17 PROCEDURE — 83036 HEMOGLOBIN GLYCOSYLATED A1C: CPT | Performed by: PHYSICIAN ASSISTANT

## 2020-07-17 PROCEDURE — 99222 1ST HOSP IP/OBS MODERATE 55: CPT | Performed by: INTERNAL MEDICINE

## 2020-07-17 PROCEDURE — 36415 COLL VENOUS BLD VENIPUNCTURE: CPT | Performed by: PHYSICIAN ASSISTANT

## 2020-07-17 RX ORDER — ONDANSETRON 2 MG/ML
4 INJECTION INTRAMUSCULAR; INTRAVENOUS EVERY 6 HOURS PRN
Status: DISCONTINUED | OUTPATIENT
Start: 2020-07-17 | End: 2020-07-17 | Stop reason: HOSPADM

## 2020-07-17 RX ORDER — ACETAMINOPHEN 325 MG/1
650 TABLET ORAL EVERY 6 HOURS PRN
Status: DISCONTINUED | OUTPATIENT
Start: 2020-07-17 | End: 2020-07-17 | Stop reason: HOSPADM

## 2020-07-17 RX ORDER — ATENOLOL 25 MG/1
25 TABLET ORAL DAILY
Status: DISCONTINUED | OUTPATIENT
Start: 2020-07-17 | End: 2020-07-17 | Stop reason: HOSPADM

## 2020-07-17 RX ORDER — OMEGA-3/DHA/EPA/FISH OIL 60 MG-90MG
1 CAPSULE ORAL DAILY
COMMUNITY
End: 2021-03-03

## 2020-07-17 RX ORDER — ATORVASTATIN CALCIUM 40 MG/1
40 TABLET, FILM COATED ORAL
Status: DISCONTINUED | OUTPATIENT
Start: 2020-07-17 | End: 2020-07-17 | Stop reason: HOSPADM

## 2020-07-17 RX ADMIN — ATENOLOL 25 MG: 25 TABLET ORAL at 08:29

## 2020-07-17 RX ADMIN — ENOXAPARIN SODIUM 40 MG: 40 INJECTION SUBCUTANEOUS at 08:29

## 2020-07-17 NOTE — ASSESSMENT & PLAN NOTE
 Patient Presentation:  Has had multiple episodes of chest pressure today with an episodes of presyncope, currently no chest pain at this time   MELLY: 2   Initial Troponin: normal x2  o Trend x3 or to peak   Initial EKG: T wave inversions, new, in lead III    o Monitor on telemetry   Check fasting lipid panel and A1c   Admit under Observation Status  [facility]     Continue atenolol

## 2020-07-17 NOTE — CONSULTS
Consultation - Cardiology   Omero Rowe 81 y o  female MRN: [id number]  Unit/Bed#: -01 Encounter: [id number]  07/17/20  10:54 AM    Assessment/ Plan:  1-chest pain, troponins negative x3 plan for outpatient stress test   EKG normal sinus rhythm and 1 EKG with normal sinus rhythm and cannot rule out inferior infarct age undetermined  Continue all home medications; atenolol, Lipitor    2-hypertension, stable  Continue all medications    3-hyperlipidemia, continue Lipitor    4-abnormal EKG, 3 EKGs with normal sinus rhythm come 1 EKG with cannot rule out inferior infarct, age undetermined  No acute changes  Patient is stable from a cardiac standpoint for discharge  Will follow up in the office with outpatient stress test       History of Present Illness   Physician Requesting Consult: Monica English MD    Reason for Consult / Principal Problem: ekg changes and cp    HPI: Omero Rowe is a 81y o  year old female who presents with syncopal episode and chest pain  Patient states that she was at [facility] for 1 hour printing pictures when she he got a sudden onset of she could pass out, but did not actually pass out  EMS was called, patient was found to have normal vital signs, she eventually drove herself home after she felt well  Patient states after dinner she got a sudden onset of substernal chest pressure and heaviness that lasted 15-30 minutes, she states she took an aspirin and felt better  She states she was so nervous she went to urgent care who advised her to come to the emergency room for further evaluation and treatment  Upon evaluation emergency room patient was found have EKG with new T-wave inversions in lead 3 therefore admitted  Currently patient states she is feeling well and wants to go home  She has no further symptoms  Patient has no personal history of CAD  She does have hypertension and hyperlipidemia  She is a nonsmoker    Patient is limited in mobility because of back surgery, knee pain  Consults    EKG: NSR x3, NSR with Cannot rule out inferior infarct age undertermined      Review of Systems   Constitutional: Negative  Respiratory: Negative  Cardiovascular: Positive for chest pain  Neurological:        +near syncope   Hematological: Negative  Psychiatric/Behavioral: Negative  All other systems reviewed and are negative        Historical Information   Past Medical History:   Diagnosis Date    Alopecia     Cardiac arrhythmia     Compression fracture of L1 lumbar vertebra ([address] )     resolved 09/2014    Occlusion of carotid artery     unspecified laterality resolved 08/05/2016     Past Surgical History:   Procedure Laterality Date    APPENDECTOMY      BACK SURGERY      BREAST BIOPSY Right     pt doesn't recall the year    COLONOSCOPY      resolved 2009    ESOPHAGOGASTRODUODENOSCOPY      mild gastritis resolved 2009    SHOULDER SURGERY      THROMBOENDARTERECTOMY Left     carotid, resloved 12/2012    TONSILLECTOMY       Social History     Substance and Sexual Activity   Alcohol Use Not Currently    Comment: rarely (history); socially     Social History     Substance and Sexual Activity   Drug Use No     Social History     Tobacco Use   Smoking Status Never Smoker   Smokeless Tobacco Never Used       Family History:   Family History   Problem Relation Age of Onset    No Known Problems Mother     No Known Problems Sister     No Known Problems Daughter     No Known Problems Maternal Grandmother     No Known Problems Paternal Grandmother     No Known Problems Daughter        Meds/Allergies   all current active meds have been reviewed and current meds:   Current Facility-Administered Medications   Medication Dose Route Frequency    acetaminophen (TYLENOL) tablet 650 mg  650 mg Oral Q6H PRN    atenolol (TENORMIN) tablet 25 mg  25 mg Oral Daily    atorvastatin (LIPITOR) tablet 40 mg  40 mg Oral Daily With Dinner    enoxaparin (LOVENOX) subcutaneous injection 40 mg  40 mg Subcutaneous Daily    lubiprostone (AMITIZA) capsule 24 mcg  24 mcg Oral BID With Meals    ondansetron (ZOFRAN) injection 4 mg  4 mg Intravenous Q6H PRN     Allergies   Allergen Reactions    Myrbetriq [Mirabegron] GI Intolerance and Hives    Latex Hives and Swelling    Barium Sulfate Diarrhea    Penicillins        Objective   Vitals: Blood pressure 129/65, pulse 72, temperature (!) 97 4 °F (36 3 °C), resp  rate 17, height 5' 7" (1 702 m), weight 70 7 kg (155 lb 13 8 oz), SpO2 96 %  , Body mass index is 24 41 kg/m² ,   Orthostatic Blood Pressures      Most Recent Value   Blood Pressure  129/65 filed at 07/17/2020 0703   Patient Position - Orthostatic VS  Lying filed at 07/17/2020 6448          Systolic (24VWJ), PHA:093 , Min:129 , JULIO CESAR:505     Diastolic (10KDU), OCS:73, Min:62, Max:79        Intake/Output Summary (Last 24 hours) at 7/17/2020 1054  Last data filed at 7/17/2020 0900  Gross per 24 hour   Intake 180 ml   Output 700 ml   Net -520 ml       Invasive Devices     Peripheral Intravenous Line            Peripheral IV 07/16/20 Right Antecubital less than 1 day          Drain            Urethral Catheter Latex 16 Fr  634 days                    Physical Exam:  GEN: Alert and oriented x 3, in no acute distress  Well appearing and well nourished  HEENT: Sclera anicteric, conjunctivae pink, mucous membranes moist  Oropharynx clear  NECK: Supple, no carotid bruits, no significant JVD  Trachea midline, no thyromegaly  HEART: Regular rhythm, normal S1 and S2, + murmur  LUNGS: Clear to auscultation bilaterally; no wheezes, rales, or rhonchi  No increased work of breathing or signs of respiratory distress  ABDOMEN: Soft, nontender, nondistended, normoactive bowel sounds  EXTREMITIES: Skin warm and well perfused, no clubbing, cyanosis, or edema  NEURO: No focal findings  Normal speech  Mood and affect normal    SKIN: Normal without suspicious lesions on exposed skin        Lab Results:     Troponins:   Results from last 7 days   Lab Units 07/17/20  0348 07/17/20  0049 07/16/20  2153   TROPONIN I ng/mL 0 02 <0 02 <0 02       CBC with diff:   Results from last 7 days   Lab Units 07/17/20  0148 07/16/20  2153   WBC Thousand/uL  --  4 26*   HEMOGLOBIN g/dL  --  11 9   HEMATOCRIT %  --  35 5   MCV fL  --  93   PLATELETS Thousands/uL 148* 165   MCH pg  --  31 2   MCHC g/dL  --  33 5   RDW %  --  12 3   MPV fL 10 0 10 5   NRBC AUTO /100 WBCs  --  0         CMP:   Results from last 7 days   Lab Units 07/16/20  2153   POTASSIUM mmol/L 4 3   CHLORIDE mmol/L 101   CO2 mmol/L 29   BUN mg/dL 26*   CREATININE mg/dL 1 21   CALCIUM mg/dL 10 1   AST U/L 23   ALT U/L 24   ALK PHOS U/L 111   EGFR ml/min/1 73sq m 41

## 2020-07-17 NOTE — PLAN OF CARE
Problem: PAIN - ADULT  Goal: Verbalizes/displays adequate comfort level or baseline comfort level  Description  Interventions:  - Encourage patient to monitor pain and request assistance  - Assess pain using appropriate pain scale  - Administer analgesics based on type and severity of pain and evaluate response  - Implement non-pharmacological measures as appropriate and evaluate response  - Consider cultural and social influences on pain and pain management  - Notify physician/advanced practitioner if interventions unsuccessful or patient reports new pain  Outcome: Progressing     Problem: SAFETY ADULT  Goal: Patient will remain free of falls  Description  INTERVENTIONS:  - Assess patient frequently for physical needs  -  Identify cognitive and physical deficits and behaviors that affect risk of falls    -  [place] fall precautions as indicated by assessment   - Educate patient/family on patient safety including physical limitations  - Instruct patient to call for assistance with activity based on assessment  - Modify environment to reduce risk of injury  - Consider OT/PT consult to assist with strengthening/mobility  Outcome: Progressing  Goal: Maintain or return to baseline ADL function  Description  INTERVENTIONS:  -  Assess patient's ability to carry out ADLs; assess patient's baseline for ADL function and identify physical deficits which impact ability to perform ADLs (bathing, care of mouth/teeth, toileting, grooming, dressing, etc )  - Assess/evaluate cause of self-care deficits   - Assess range of motion  - Assess patient's mobility; develop plan if impaired  - Assess patient's need for assistive devices and provide as appropriate  - Encourage maximum independence but intervene and supervise when necessary  - Involve family in performance of ADLs  - Assess for home care needs following discharge   - Consider OT consult to assist with ADL evaluation and planning for discharge  - Provide patient education as appropriate  Outcome: Progressing  Goal: Maintain or return mobility status to optimal level  Description  INTERVENTIONS:  - Assess patient's baseline mobility status (ambulation, transfers, stairs, etc )    - Identify cognitive and physical deficits and behaviors that affect mobility  - Identify mobility aids required to assist with transfers and/or ambulation (gait belt, sit-to-stand, lift, walker, cane, etc )  - [place] fall precautions as indicated by assessment  - Record patient progress and toleration of activity level on Mobility SBAR; progress patient to next Phase/Stage  - Instruct patient to call for assistance with activity based on assessment  - Consider rehabilitation consult to assist with strengthening/weightbearing, etc   Outcome: Progressing     Problem: DISCHARGE PLANNING  Goal: Discharge to home or other facility with appropriate resources  Description  INTERVENTIONS:  - Identify barriers to discharge w/patient and caregiver  - Arrange for needed discharge resources and transportation as appropriate  - Identify discharge learning needs (meds, wound care, etc )  - Arrange for interpretive services to assist at discharge as needed  - Refer to Case Management Department for coordinating discharge planning if the patient needs post-hospital services based on physician/advanced practitioner order or complex needs related to functional status, cognitive ability, or social support system  Outcome: Progressing     Problem: Knowledge Deficit  Goal: Patient/family/caregiver demonstrates understanding of disease process, treatment plan, medications, and discharge instructions  Description  Complete learning assessment and assess knowledge base    Interventions:  - Provide teaching at level of understanding  - Provide teaching via preferred learning methods  Outcome: Progressing     Problem: CARDIOVASCULAR - ADULT  Goal: Maintains optimal cardiac output and hemodynamic stability  Description  INTERVENTIONS:  - Monitor I/O, vital signs and rhythm  - Monitor for S/S and trends of decreased cardiac output  - Administer and titrate ordered vasoactive medications to optimize hemodynamic stability  - Assess quality of pulses, skin color and temperature  - Assess for signs of decreased coronary artery perfusion  - Instruct patient to report change in severity of symptoms  Outcome: Progressing  Goal: Absence of cardiac dysrhythmias or at baseline rhythm  Description  INTERVENTIONS:  - Continuous cardiac monitoring, vital signs, obtain 12 lead EKG if ordered  - Administer antiarrhythmic and heart rate control medications as ordered  - Monitor electrolytes and administer replacement therapy as ordered  Outcome: Progressing     Problem: Potential for Falls  Goal: Patient will remain free of falls  Description  INTERVENTIONS:  - Assess patient frequently for physical needs  -  Identify cognitive and physical deficits and behaviors that affect risk of falls    -  [place] fall precautions as indicated by assessment   - Educate patient/family on patient safety including physical limitations  - Instruct patient to call for assistance with activity based on assessment  - Modify environment to reduce risk of injury  - Consider OT/PT consult to assist with strengthening/mobility  Outcome: Progressing

## 2020-07-17 NOTE — ASSESSMENT & PLAN NOTE
 Patient Presentation:  Has had multiple episodes of chest pressure today with an episodes of presyncope   EKG showed new T-wave inversions in lead 3 however otherwise showed no signs of ischemia   Troponins x3 were negative   Cardiology was consulted who recommended outpatient stress testing performed next week   Chest pain resolved patient medically stable for discharge  

## 2020-07-17 NOTE — H&P
[address] - Internal Medicine Service  H&P- Priya Lee 1935, 81 y o  female MRN: [id number]  Unit/Bed#: ED 24 Encounter: [id number]  Primary Care Provider: Saurabh Heath MD   Date and time admitted to hospital: 7/16/2020  8:57 PM    * Chest pressure  Assessment & Plan   Patient Presentation:  Has had multiple episodes of chest pressure today with an episodes of presyncope, currently no chest pain at this time   MELLY: 2   Initial Troponin: normal x2  o Trend x3 or to peak   Initial EKG: T wave inversions, new, in lead III    o Monitor on telemetry   Check fasting lipid panel and A1c   Admit under Observation Status  [facility] cardiology   Continue atenolol    Combined hyperlipidemia  Assessment & Plan  · Continue Lipitor  · Recheck lipid panel  Hypertension, essential  Assessment & Plan  · Blood pressure 142/62  · Continue atenolol  · Monitor blood pressure  Arthritis  Assessment & Plan  · Continue Tylenol p r n  Pain  VTE Prophylaxis: Enoxaparin (Lovenox)  / sequential compression device   Code Status: full code   POLST: POLST form is not discussed and not completed at this time  Discussion with family: patient at bedside    Anticipated Length of Stay:  Patient will be admitted on an Inpatient basis with an anticipated length of stay of > 2 midnights  Justification for Hospital Stay: CP, abnormal EKG    Total Time for Visit, including Counseling / Coordination of Care: 1 hour  Greater than 50% of this total time spent on direct patient counseling and coordination of care  Chief Complaint:   Chest pressure    History of Present Illness: Priya Lee is a 81 y o  female who presents with chest pressure, which has been intermittently lasting 15-30 minutes throughout the day  She has had multiple episodes, prompting urgent care evaluation  She was then sent emergency department for evaluation  EKG reveals new T-wave inversions in lead 3  She does not have any chest pressure at this time  She has also reported that she has had some presyncopal episodes but no syncope  No radiation of the pressure to her arm, neck  No history of coronary artery disease in the past   No known history of any cardiac arrhythmias, however she is on a beta-blocker  Denies any fevers, chills, coughing, shortness of breath  Reports minimal leg swelling  No sick contacts and she has been taking her medications as directed  Review of Systems:    Review of Systems   Constitutional: Negative for activity change, appetite change, chills, fatigue and fever  HENT: Negative for congestion, rhinorrhea, sinus pressure and sore throat  Eyes: Negative for photophobia, pain and visual disturbance  Respiratory: Negative for cough, shortness of breath and wheezing  Cardiovascular: Positive for chest pain and leg swelling  Negative for palpitations  Gastrointestinal: Negative for abdominal distention, abdominal pain, constipation, diarrhea, nausea and vomiting  Endocrine: Negative for cold intolerance, heat intolerance, polydipsia and polyuria  Genitourinary: Negative for difficulty urinating, dysuria, flank pain, frequency and hematuria  Musculoskeletal: Negative for arthralgias, back pain and joint swelling  Skin: Negative for color change, pallor and rash  Allergic/Immunologic: Negative  Neurological: Positive for light-headedness  Negative for dizziness, syncope, weakness and headaches  Hematological: Negative  Psychiatric/Behavioral: Negative          Past Medical and Surgical History:     Past Medical History:   Diagnosis Date    Alopecia     Cardiac arrhythmia     Compression fracture of L1 lumbar vertebra ([address] )     resolved 09/2014    Occlusion of carotid artery     unspecified laterality resolved 08/05/2016       Past Surgical History:   Procedure Laterality Date    APPENDECTOMY      BACK SURGERY      BREAST BIOPSY Right     pt doesn't recall the year    COLONOSCOPY      resolved 2009    ESOPHAGOGASTRODUODENOSCOPY      mild gastritis resolved 2009    SHOULDER SURGERY      THROMBOENDARTERECTOMY Left     carotid, resloved 12/2012    TONSILLECTOMY         Meds/Allergies:    Prior to Admission medications    Medication Sig Start Date End Date Taking? Authorizing Provider   alendronate (FOSAMAX) 35 mg tablet Take 1 tablet (35 mg total) by mouth every 7 days 9/23/19   Jessica Gillette MD   atenolol (TENORMIN) 25 mg tablet Take 1 tablet (25 mg total) by mouth daily 11/6/19   Jessica Gillette MD   atorvastatin (LIPITOR) 40 mg tablet Take 1 tablet (40 mg total) by mouth daily 11/6/19   Jessica Gillette MD   Calcium Carbonate-Vitamin D3 600-400 MG-UNIT TABS 1 tab daily    Historical Provider, MD   CRANBERRY SOFT PO Take by mouth    Historical Provider, MD   estradiol (ESTRACE VAGINAL) 0 1 mg/g vaginal cream     Historical Provider, MD   lubiprostone (AMITIZA) 24 mcg capsule Take 1 capsule (24 mcg total) by mouth 2 (two) times a day with meals 10/21/19   Brenda Roberts PA-C   multivitamin [facility]) TABS Take 1 tablet by mouth    Historical Provider, MD   nystatin-triamcinolone Elfrieda Brigitte) ointment  2/18/19   Historical Provider, MD     I have reviewed home medications with patient personally  Allergies:    Allergies   Allergen Reactions    Myrbetriq [Mirabegron] GI Intolerance and Hives    Latex Hives and Swelling    Barium Sulfate Diarrhea    Penicillins        Social History:     Marital Status: /Civil Union   Occupation: non-contributory  Patient Pre-hospital Living Situation: home  Patient Pre-hospital Level of Mobility: full  Patient Pre-hospital Diet Restrictions: none  Substance Use History:   Social History     Substance and Sexual Activity   Alcohol Use Not Currently    Comment: rarely (history); socially     Social History     Tobacco Use   Smoking Status Never Smoker   Smokeless Tobacco Never Used     Social History Substance and Sexual Activity   Drug Use No       Family History:    Family History   Problem Relation Age of Onset    No Known Problems Mother     No Known Problems Sister     No Known Problems Daughter     No Known Problems Maternal Grandmother     No Known Problems Paternal Grandmother     No Known Problems Daughter        Physical Exam:     Vitals:   Blood Pressure: 142/62 (07/17/20 0000)  Pulse: 73 (07/17/20 0000)  Temperature: 97 9 °F (36 6 °C) (07/16/20 2106)  Temp Source: Oral (07/16/20 2106)  Respirations: 16 (07/17/20 0000)  Height: 5' 7" (170 2 cm) (07/16/20 2106)  Weight - Scale: 69 4 kg (153 lb) (07/16/20 2106)  SpO2: 96 % (07/17/20 0000)    Physical Exam   Constitutional: She is oriented to person, place, and time  She appears well-developed and well-nourished  No distress  HENT:   Head: Normocephalic and atraumatic  Mouth/Throat: Oropharynx is clear and moist    Eyes: Pupils are equal, round, and reactive to light  EOM are normal  No scleral icterus  Neck: Neck supple  Cardiovascular: Normal rate and regular rhythm  Murmur heard  Pulmonary/Chest: Effort normal and breath sounds normal  No respiratory distress  She has no wheezes  She has no rales  Abdominal: Soft  Bowel sounds are normal  She exhibits no distension  There is no tenderness  There is no rebound and no guarding  Musculoskeletal: She exhibits no deformity  Neurological: She is alert and oriented to person, place, and time  Skin: Skin is warm and dry  Capillary refill takes less than 2 seconds  No rash noted  She is not diaphoretic  No erythema  No pallor  Psychiatric: She has a normal mood and affect  Nursing note and vitals reviewed  Additional Data:     Lab Results: I have personally reviewed pertinent reports        Results from last 7 days   Lab Units 07/16/20  2153   WBC Thousand/uL 4 26*   HEMOGLOBIN g/dL 11 9   HEMATOCRIT % 35 5   PLATELETS Thousands/uL 165   NEUTROS PCT % 67   LYMPHS PCT % 22 MONOS PCT % 9   EOS PCT % 1     Results from last 7 days   Lab Units 07/16/20  2153   SODIUM mmol/L 138   POTASSIUM mmol/L 4 3   CHLORIDE mmol/L 101   CO2 mmol/L 29   BUN mg/dL 26*   CREATININE mg/dL 1 21   ANION GAP mmol/L 8   CALCIUM mg/dL 10 1   ALBUMIN g/dL 3 6   TOTAL BILIRUBIN mg/dL 0 30   ALK PHOS U/L 111   ALT U/L 24   AST U/L 23   GLUCOSE RANDOM mg/dL 106     Results from last 7 days   Lab Units 07/16/20  2153   INR  0 98                   Imaging: I have personally reviewed pertinent films in PACS    XR chest 1 view portable    (Results Pending)       EKG, Pathology, and Other Studies Reviewed on Admission:   · Prior pertinent studies and records reviewed in [facility] Records Reviewed: Yes     ** Please Note: This note has been constructed using a voice recognition system   **

## 2020-07-17 NOTE — SOCIAL WORK
CM discussed pt in care coordination rounds  CM met  W/ pt &  at bedside and introduced self and role  Pt reports that she lives w/  in ranch home w/ 2 steps to enter    Pt reports that she has been independent  W  ADLS & Ambulation w/ cane  Pt has had falls and declines services, however CM admonished pt that PT OT eval will be done and services may be recommended  Marilou Callaway MD re lucas  Pt drives and is retired  Pt uses [facility] for pharmacy needs and has no issues w  [facility]  PCP Dr Venkatesh Ayers  Pt has never had short term rehab, psych or D & A Admission history  No VNA  Pt has POA, states copy on chart  CM reviewed d/c planning process including the following: identifying help at home, patient preference for d/c planning needs, Discharge Lounge, [facility] Meds to Bed program, availability of treatment team to discuss questions or concerns patient and/or family may have regarding understanding medications and recognizing signs and symptoms once discharged  CM also encouraged patient to follow up with all recommended appointments after discharge  Patient advised of importance for patient and family to participate in managing patients medical well being

## 2020-07-17 NOTE — DISCHARGE SUMMARY
Discharge- Joe Jolly 1935, 81 y o  female MRN: [id number]    Unit/Bed#: -01 Encounter: [id number]    Primary Care Provider: Naomy Bundy MD   Date and time admitted to hospital: 7/16/2020  8:57 PM        Combined hyperlipidemia  Assessment & Plan  · Continue Lipitor  · Recheck lipid panel  Hypertension, essential  Assessment & Plan  · Blood pressure 142/62  · Continue atenolol  · Monitor blood pressure  Arthritis  Assessment & Plan  · Continue Tylenol p r n  Pain  * Chest pressure  Assessment & Plan   Patient Presentation:  Has had multiple episodes of chest pressure today with an episodes of presyncope   EKG showed new T-wave inversions in lead 3 however otherwise showed no signs of ischemia   Troponins x3 were negative   Cardiology was consulted who recommended outpatient stress testing performed next week   Chest pain resolved patient medically stable for discharge             Discharging Physician / Practitioner: Jai Nielsen MD  PCP: Naomy Bundy MD  Admission Date:   Admission Orders (From admission, onward)     Ordered        07/17/20 0004  Inpatient Admission  Once                   Discharge Date: 07/17/20    Resolved Problems  Date Reviewed: 7/17/2020    None          Consultations During Hospital Stay:  · Cardiology    Procedures Performed:   · None    Significant Findings / Test Results:   · Troponins x3 negative    Incidental Findings:   · None     Test Results Pending at Discharge (will require follow up): · None     Outpatient Tests Requested:  · Outpatient stress test to be performed by Cardiology next week    Complications:  None    Reason for Admission:  Chest pain    Hospital Course: Joe Jolly is a 81 y o  female patient who originally presented to the hospital on 7/16/2020 due to dizziness and chest pain    Patient states prior to arrival she felt dizzy and had sudden onset chest pain the night prior to arrival that lasted approximately 30 minutes  She took an aspirin felt better  However she went to urgent care earlier in the morning who advised to come to the emergency department  When she arrived she was noted to have a new T-wave inversion lead 3 otherwise there is no signs of ischemia  She was admitted for ACS rule out  Throughout her admission she had no chest pain  She had 3 troponins performed which were all negative  She has no personal history of coronary artery disease  She does have a history of hypertension hyperlipidemia  She was deemed medically stable for discharge and the plan is to have an outpatient stress test performed next week by Cardiology  The patient, initially admitted to the hospital as inpatient, was discharged earlier than expected given the following: Cardiac chest pain being ruled out earlier than expected  Please see above list of diagnoses and related plan for additional information  Condition at Discharge: stable     Discharge Day Visit / Exam:     Subjective:  Patient seen and examined  No acute overnight events  She denies any chest pain, shortness of breath, palpitations, abdominal pain, nausea, vomiting, fevers, weakness, headache, or any other symptoms at this time  Overall states she is doing well  Vitals: Blood Pressure: 129/65 (07/17/20 0703)  Pulse: 72 (07/17/20 0829)  Temperature: (!) 97 4 °F (36 3 °C) (07/17/20 0703)  Temp Source: Oral (07/16/20 2106)  Respirations: 17 (07/17/20 0703)  Height: 5' 7" (170 2 cm) (07/17/20 0516)  Weight - Scale: 70 7 kg (155 lb 13 8 oz) (07/17/20 0516)  SpO2: 96 % (07/17/20 0516)  Exam:   Physical Exam   Constitutional: She is oriented to person, place, and time  She appears well-developed and well-nourished  No distress  HENT:   Head: Normocephalic and atraumatic  Nose: Nose normal    Mouth/Throat: Oropharynx is clear and moist  No oropharyngeal exudate  Eyes: Pupils are equal, round, and reactive to light   EOM are normal  Right eye exhibits no discharge  Left eye exhibits no discharge  No scleral icterus  Neck: Normal range of motion  Neck supple  No JVD present  Cardiovascular: Normal rate, regular rhythm, normal heart sounds and intact distal pulses  Exam reveals no gallop and no friction rub  No murmur heard  Pulmonary/Chest: Effort normal and breath sounds normal  No respiratory distress  She has no wheezes  She has no rales  She exhibits no tenderness  Abdominal: Soft  Bowel sounds are normal  She exhibits no distension and no mass  There is no tenderness  There is no rebound and no guarding  Musculoskeletal: Normal range of motion  She exhibits no edema, tenderness or deformity  Lymphadenopathy:     She has no cervical adenopathy  Neurological: She is alert and oriented to person, place, and time  She has normal reflexes  Skin: Skin is warm and dry  No rash noted  She is not diaphoretic  No erythema  No pallor  Psychiatric: She has a normal mood and affect  Her behavior is normal        Discussion with Family:  Patient and     Discharge instructions/Information to patient and family:   See after visit summary for information provided to patient and family  Provisions for Follow-Up Care:  See after visit summary for information related to follow-up care and any pertinent home health orders  Disposition:     Home    For Discharges to [facility] SNF:   · Not Applicable to this Patient - Not Applicable to this Patient    Planned Readmission: None     Discharge Statement:  I spent 50 minutes discharging the patient  This time was spent on the day of discharge  I had direct contact with the patient on the day of discharge  Greater than 50% of the total time was spent examining patient, answering all patient questions, arranging and discussing plan of care with patient as well as directly providing post-discharge instructions  Additional time then spent on discharge activities      Discharge Medications:  See after visit summary for reconciled discharge medications provided to patient and family        ** Please Note: This note has been constructed using a voice recognition system **

## 2020-07-17 NOTE — ED NOTES
1  Chief Complaint: Chest Pain (Patient reports that she had 5 episodes of near syncope since 2pm this afternoona nd then started with chest pain around 6pm)     2  Is this admission related to an injury? no     3  Orientation Status: AO x4     4  Abnormal Labs/ Abnormal Focused Assessment/ Abnormal Vitals: WBC 4 26; BUN 26; proBNP 476     5  Medication/ Drips: n/a     6  Last narcotic/ pain medication given: n/a     7  IV lines/ drains/ etc: 18G RAC     8  Isolation Status: none     9  Skin: intact     10   Ambulatory Status: assist x1     11: ED Phone Number: [phone]     Cachorro Bro RN  07/17/20 4021

## 2020-07-17 NOTE — ED PROVIDER NOTES
History  Chief Complaint   Patient presents with    Chest Pain     Patient reports that she had 5 episodes of near syncope since 2pm this afternoona nd then started with chest pain around 10m     81year old female patient presents emergency department for evaluation of chest pressure  The patient initially would not describe this as chest pain, she describes a pressure as if someone is sitting on her chest, it lasts for 20-30 minutes approximately that time, there has been several episodes throughout the day today  The patient came into the emergency department for evaluation after going to Urgent Care for evaluation  The patient does have subtle EKG changes seen on EKG done at 2105 hours, reviewed interpreted by me, sinus induced rhythm with a ventricular rate of 74  The patient has inverted T-waves that are new in lead three from previous EKG done in 2019  The remainder of EKG is nonischemic in its appearance  Plan will be for evaluation with a differential diagnosis to include but not be limited to STEMI, NSTEMI, electrolyte abnormality  Plan will be for admission for evaluation of chest pressure        History provided by:  Patient   used: No    Chest Pain   Pain location:  Substernal area  Pain quality: aching and pressure    Pain radiates to:  Does not radiate  Pain severity:  Mild  Onset quality:  Gradual  Timing:  Constant  Progression:  Worsening  Chronicity:  New  Context: not breathing, no intercourse and no movement    Relieved by:  Nothing  Worsened by:  Nothing tried  Ineffective treatments:  None tried  Associated symptoms: no altered mental status, no back pain, no heartburn and no palpitations        Prior to Admission Medications   Prescriptions Last Dose Informant Patient Reported? Taking?    CRANBERRY SOFT PO  Self Yes No   Sig: Take by mouth   Calcium Carbonate-Vitamin D3 600-400 MG-UNIT TABS  Self Yes No   Si tab daily   alendronate (FOSAMAX) 35 mg tablet  Self No No   Sig: Take 1 tablet (35 mg total) by mouth every 7 days   atenolol (TENORMIN) 25 mg tablet  Self No No   Sig: Take 1 tablet (25 mg total) by mouth daily   atorvastatin (LIPITOR) 40 mg tablet  Self No No   Sig: Take 1 tablet (40 mg total) by mouth daily   estradiol (ESTRACE VAGINAL) 0 1 mg/g vaginal cream  Self Yes No   lubiprostone (AMITIZA) 24 mcg capsule  Self No No   Sig: Take 1 capsule (24 mcg total) by mouth 2 (two) times a day with meals   multivitamin (THERAGRAN) TABS  Self Yes No   Sig: Take 1 tablet by mouth   nystatin-triamcinolone (MYCOLOG-II) ointment  Self Yes No      Facility-Administered Medications: None       Past Medical History:   Diagnosis Date    Alopecia     Cardiac arrhythmia     Compression fracture of L1 lumbar vertebra (HCC)     resolved 09/2014    Occlusion of carotid artery     unspecified laterality resolved 08/05/2016       Past Surgical History:   Procedure Laterality Date    APPENDECTOMY      BACK SURGERY      BREAST BIOPSY Right     pt doesn't recall the year    COLONOSCOPY      resolved 2009    ESOPHAGOGASTRODUODENOSCOPY      mild gastritis resolved 2009    SHOULDER SURGERY      THROMBOENDARTERECTOMY Left     carotid, resloved 12/2012    TONSILLECTOMY         Family History   Problem Relation Age of Onset    No Known Problems Mother     No Known Problems Sister     No Known Problems Daughter     No Known Problems Maternal Grandmother     No Known Problems Paternal Grandmother     No Known Problems Daughter      I have reviewed and agree with the history as documented  E-Cigarette/Vaping    E-Cigarette Use Never User      E-Cigarette/Vaping Substances     Social History     Tobacco Use    Smoking status: Never Smoker    Smokeless tobacco: Never Used   Substance Use Topics    Alcohol use: Not Currently     Comment: rarely (history); socially    Drug use: No       Review of Systems   Cardiovascular: Positive for chest pain   Negative for palpitations  Gastrointestinal: Negative for heartburn  Musculoskeletal: Negative for back pain  All other systems reviewed and are negative  Physical Exam  Physical Exam   Constitutional: She is oriented to person, place, and time  She appears well-developed and well-nourished  HENT:   Head: Normocephalic and atraumatic  Right Ear: External ear normal    Left Ear: External ear normal    Eyes: Conjunctivae and EOM are normal    Neck: No JVD present  No tracheal deviation present  No thyromegaly present  Cardiovascular: Normal rate  Pulmonary/Chest: Effort normal and breath sounds normal  No stridor  Abdominal: Soft  She exhibits no distension and no mass  There is no tenderness  There is no guarding  No hernia  Musculoskeletal: Normal range of motion  She exhibits no edema, tenderness or deformity  Lymphadenopathy:     She has no cervical adenopathy  Neurological: She is alert and oriented to person, place, and time  Skin: Skin is warm  No rash noted  No erythema  No pallor  Psychiatric: She has a normal mood and affect  Her behavior is normal    Nursing note and vitals reviewed        Vital Signs  ED Triage Vitals [07/16/20 2106]   Temperature Pulse Respirations Blood Pressure SpO2   97 9 °F (36 6 °C) 68 18 151/79 99 %      Temp Source Heart Rate Source Patient Position - Orthostatic VS BP Location FiO2 (%)   Oral Monitor Sitting Left arm --      Pain Score       --           Vitals:    07/16/20 2106   BP: 151/79   Pulse: 68   Patient Position - Orthostatic VS: Sitting         Visual Acuity      ED Medications  Medications - No data to display    Diagnostic Studies  Results Reviewed     Procedure Component Value Units Date/Time    CBC and differential [[id number]] Collected:  07/16/20 2153    Lab Status:  No result Specimen:  Blood from Arm, Right     Basic metabolic panel [[id number]] Collected:  07/16/20 2153    Lab Status:  No result Specimen:  Blood from Arm, Right     Hepatic function panel [[id number]] Collected:  07/16/20 2153    Lab Status:  No result Specimen:  Blood from Arm, Right     Troponin I [[id number]] Collected:  07/16/20 2153    Lab Status:  No result Specimen:  Blood from Arm, Right     Protime-INR [[id number]] Collected:  07/16/20 2153    Lab Status:  No result Specimen:  Blood from Arm, Right     TSH, 3rd generation with Free T4 reflex [[id number]] Collected:  07/16/20 2153    Lab Status:  No result Specimen:  Blood from Arm, Right     Magnesium [[id number]] Collected:  07/16/20 2153    Lab Status:  No result Specimen:  Blood from Arm, Right     NT-BNP PRO [[id number]] Collected:  07/16/20 2153    Lab Status:  No result Specimen:  Blood from Arm, Right                  XR chest 1 view portable    (Results Pending)              Procedures  Procedures         ED Course       US AUDIT      Most Recent Value   Initial Alcohol Screen: US AUDIT-C    1  How often do you have a drink containing alcohol?  0 Filed at: 07/16/2020 2111   2  How many drinks containing alcohol do you have on a typical day you are drinking? 0 Filed at: 07/16/2020 2111   3b  FEMALE Any Age, or MALE 65+: How often do you have 4 or more drinks on one occassion? 0 Filed at: 07/16/2020 2111   Audit-C Score  0 Filed at: 07/16/2020 2111              Identification of Seniors at Risk      Most Recent Value   (ISAR) Identification of Seniors at Risk   Before the illness or injury that brought you to the Emergency, did you need someone to help you on a regular basis? 0 Filed at: 07/16/2020 2113   In the last 24 hours, have you needed more help than usual?  0 Filed at: 07/16/2020 2113   Have you been hospitalized for one or more nights during the past 6 months? 0 Filed at: 07/16/2020 2113   In general, do you see well?  0 Filed at: 07/16/2020 2113   In general, do you have serious problems with your memory? 0 Filed at: 07/16/2020 2113   Do you take more than three different medications every day?   1 Filed at: 07/16/2020 2113   ISAR Score  1 Filed at: 07/16/2020 2113          ISABELLA/DAST-10      Most Recent Value   How many times in the past year have you    Used an illegal drug or used a prescription medication for non-medical reasons? Never Filed at: 07/16/2020 2111                                MDM  Number of Diagnoses or Management Options  Chest pain: new and requires workup     Amount and/or Complexity of Data Reviewed  Clinical lab tests: reviewed and ordered  Tests in the radiology section of CPT®: ordered and reviewed  Decide to obtain previous medical records or to obtain history from someone other than the patient: yes  Review and summarize past medical records: yes          Disposition  Final diagnoses:   None     ED Disposition     None      Follow-up Information    None         Patient's Medications   Discharge Prescriptions    No medications on file     No discharge procedures on file      PDMP Review     None          ED Provider  Electronically Signed by           Willard Cai DO  07/17/20 0245

## 2020-07-17 NOTE — SOCIAL WORK
Per [facility] awaiting PT eval  CM discussed hhpt for eval and laurie is in agreement if pt would like  CM met with pt, , and rn student  CM discussed pt eval [facility] vs home with hhpt for eval  Pt became upset and reports she is not participating in PT eval here  Pt reports hx of spinal surgery and would like to go home immediately  Pt refusing hhpt  LAURIE and RN outside room  CM s/w them to provide update  Per [facility] he will dc pt and discontinue PT eval      Pt's  will transport pt home

## 2020-07-17 NOTE — DISCHARGE INSTR - AVS FIRST PAGE
Dear Mack Marrero,     It was our pleasure to care for you here at [facility]  It is our hope that we were always able to exceed the expected standards for your care during your stay  You were hospitalized due to chest pain  You were cared for on the 4th floor under the service of Paul Smith MD with the [facility] who covers for your primary care physician (PCP), Janelle Chahal MD, while you were hospitalized  If you have any questions or concerns related to this hospitalization, you may contact us at [phone]  For follow up as well as medication refills, we recommend that you follow up with your primary care physician  A registered nurse will reach out to you by phone within a few days after your discharge to answer any additional questions that you may have after going home  However, at this time we provide for you here, the most important instructions / recommendations at discharge:     · Notable Medication Adjustments -   · None  · Testing Required after Discharge -   · Outpatient stress test next week  · Important follow up information -   · Please f/u with Cardiologist and PCP in the next 1 week  · Other Instructions -   · None  · Please review this entire after visit summary as additional general instructions including medication list, appointments, activity, diet, any pertinent wound care, and other additional recommendations from your care team that may be provided for you        Sincerely,     Paul Smith MD

## 2020-07-20 ENCOUNTER — TRANSITIONAL CARE MANAGEMENT (OUTPATIENT)
Dept: INTERNAL MEDICINE CLINIC | Facility: CLINIC | Age: 85
End: 2020-07-20

## 2020-07-20 ENCOUNTER — HOSPITAL ENCOUNTER (OUTPATIENT)
Dept: NON INVASIVE DIAGNOSTICS | Facility: CLINIC | Age: 85
Discharge: HOME | End: 2020-07-20
Payer: MEDICARE

## 2020-07-20 DIAGNOSIS — R07.9 CHEST PAIN, UNSPECIFIED TYPE: ICD-10-CM

## 2020-07-20 PROCEDURE — 93016 CV STRESS TEST SUPVJ ONLY: CPT | Performed by: INTERNAL MEDICINE

## 2020-07-20 PROCEDURE — A9502 TC99M TETROFOSMIN: HCPCS

## 2020-07-20 PROCEDURE — 78452 HT MUSCLE IMAGE SPECT MULT: CPT | Performed by: INTERNAL MEDICINE

## 2020-07-20 PROCEDURE — 93017 CV STRESS TEST TRACING ONLY: CPT

## 2020-07-20 PROCEDURE — 78452 HT MUSCLE IMAGE SPECT MULT: CPT

## 2020-07-20 PROCEDURE — 93018 CV STRESS TEST I&R ONLY: CPT | Performed by: INTERNAL MEDICINE

## 2020-07-20 RX ADMIN — REGADENOSON 0.4 MG: 0.08 INJECTION, SOLUTION INTRAVENOUS at 09:30

## 2020-07-21 ENCOUNTER — TELEPHONE (OUTPATIENT)
Dept: CARDIOLOGY CLINIC | Facility: CLINIC | Age: 85
End: 2020-07-21

## 2020-07-21 LAB
ARRHY DURING EX: NORMAL
CHEST PAIN STATEMENT: NORMAL
MAX DIASTOLIC BP: 60 MMHG
MAX HEART RATE: 90 BPM
MAX PREDICTED HEART RATE: 137 BPM
MAX. SYSTOLIC BP: 122 MMHG
PROTOCOL NAME: NORMAL
REASON FOR TERMINATION: NORMAL
TARGET HR FORMULA: NORMAL
TEST INDICATION: NORMAL
TIME IN EXERCISE PHASE: NORMAL

## 2020-07-21 NOTE — PHYSICIAN ADVISOR
Current patient class: Inpatient  The patient is currently on Hospital Day: 2 at [address]      The patient was admitted to the hospital  on 7/17/20 at [address] [facility] [address] for the following diagnosis:  Chest pain [R07 9]     After review of the relevant documentation, labs, vital signs and test results, this is a PROVIDER LIABLE case  In this particular case the patient was admitted to the hospital as an inpatient  The patient however failed to satisfy the 2 midnight benchmark and closer scrutiny of the case was warranted  After review of the patient presentation and relevant labs the patient was most appropriate for observation or outpatient class at the time of admission  Given that this patient has already been discharged prior to this review they become a provider liable case  Rationale is as follows: The patient was hospitalized for 17 hours, one midnight, after presenting with multiple episodes of chest pressure and an episode of presyncope  During the stay she did not have chest pain and troponin values were negative  It was recommended that she have an outpatient stress test next week    In this case, observation class was appropriate      The patients vitals on arrival were ED Triage Vitals   Temperature Pulse Respirations Blood Pressure SpO2   07/16/20 2106 07/16/20 2106 07/16/20 2106 07/16/20 2106 07/16/20 2106   97 9 °F (36 6 °C) 68 18 151/79 99 %      Temp Source Heart Rate Source Patient Position - Orthostatic VS BP Location FiO2 (%)   07/16/20 2106 07/16/20 2106 07/16/20 2106 07/16/20 2106 --   Oral Monitor Sitting Left arm       Pain Score       07/17/20 0837       No Pain           Past Medical History:   Diagnosis Date    Alopecia     Cardiac arrhythmia     Compression fracture of L1 lumbar vertebra ([address] )     resolved 09/2014    Occlusion of carotid artery     unspecified laterality resolved 08/05/2016     Past Surgical History:   Procedure Laterality Date    APPENDECTOMY      BACK SURGERY      BREAST BIOPSY Right     pt doesn't recall the year    COLONOSCOPY      resolved 2009    ESOPHAGOGASTRODUODENOSCOPY      mild gastritis resolved 2009    SHOULDER SURGERY      THROMBOENDARTERECTOMY Left     carotid, resloved 12/2012    TONSILLECTOMY             Consults have been placed to:   IP CONSULT TO CARDIOLOGY    Vitals:    07/17/20 0452 07/17/20 0516 07/17/20 0703 07/17/20 0829   BP: 146/68 147/62 129/65    BP Location: Left arm      Pulse: 68 64  72   Resp: 18 18 17    Temp:  97 7 °F (36 5 °C) (!) 97 4 °F (36 3 °C)    TempSrc:       SpO2: 98% 96%     Weight:  70 7 kg (155 lb 13 8 oz)     Height:  5' 7" (1 702 m)         Most recent labs:    No results for input(s): WBC, HGB, HCT, PLT, K, NA, CALCIUM, BUN, CREATININE, LIPASE, AMYLASE, INR, TROPONINI, CKTOTAL, AST, ALT, ALKPHOS, BILITOT in the last 72 hours  Scheduled Meds:  Continuous Infusions:  No current facility-administered medications for this encounter  PRN Meds:      Surgical procedures (if appropriate):

## 2020-07-21 NOTE — TELEPHONE ENCOUNTER
Please let the patient know that the stress test was overall good with normal ejection fraction  To let us know if she has any symptoms out of the ordinary

## 2020-10-06 ENCOUNTER — TELEPHONE (OUTPATIENT)
Dept: INTERNAL MEDICINE CLINIC | Facility: CLINIC | Age: 85
End: 2020-10-06

## 2020-10-12 ENCOUNTER — TELEPHONE (OUTPATIENT)
Dept: INTERNAL MEDICINE CLINIC | Facility: CLINIC | Age: 85
End: 2020-10-12

## 2020-11-12 ENCOUNTER — LAB (OUTPATIENT)
Dept: LAB | Facility: CLINIC | Age: 85
End: 2020-11-12
Payer: MEDICARE

## 2020-11-12 ENCOUNTER — TELEPHONE (OUTPATIENT)
Dept: GASTROENTEROLOGY | Facility: CLINIC | Age: 85
End: 2020-11-12

## 2020-11-12 DIAGNOSIS — R19.7 DIARRHEA, UNSPECIFIED TYPE: Primary | ICD-10-CM

## 2020-11-12 DIAGNOSIS — R19.7 DIARRHEA, UNSPECIFIED TYPE: ICD-10-CM

## 2020-11-12 PROCEDURE — 87045 FECES CULTURE AEROBIC BACT: CPT

## 2020-11-12 PROCEDURE — 87209 SMEAR COMPLEX STAIN: CPT

## 2020-11-12 PROCEDURE — 36415 COLL VENOUS BLD VENIPUNCTURE: CPT

## 2020-11-12 PROCEDURE — 87177 OVA AND PARASITES SMEARS: CPT

## 2020-11-12 PROCEDURE — 87046 STOOL CULTR AEROBIC BACT EA: CPT

## 2020-11-12 PROCEDURE — 87493 C DIFF AMPLIFIED PROBE: CPT

## 2020-11-12 PROCEDURE — 87329 GIARDIA AG IA: CPT

## 2020-11-12 PROCEDURE — 87427 SHIGA-LIKE TOXIN AG IA: CPT

## 2020-11-13 ENCOUNTER — TRANSCRIBE ORDERS (OUTPATIENT)
Dept: LAB | Facility: HOSPITAL | Age: 85
End: 2020-11-13

## 2020-11-13 DIAGNOSIS — M81.0 AGE-RELATED OSTEOPOROSIS WITHOUT CURRENT PATHOLOGICAL FRACTURE: ICD-10-CM

## 2020-11-13 DIAGNOSIS — R19.7 DIARRHEA, UNSPECIFIED TYPE: Primary | ICD-10-CM

## 2020-11-13 LAB
C DIFF TOX B TCDB STL QL NAA+PROBE: NEGATIVE
O+P STL CONC: NORMAL

## 2020-11-13 RX ORDER — ALENDRONATE SODIUM 35 MG/1
35 TABLET ORAL
Qty: 12 TABLET | Refills: 3 | OUTPATIENT
Start: 2020-11-13

## 2020-11-14 ENCOUNTER — LAB (OUTPATIENT)
Dept: LAB | Facility: CLINIC | Age: 85
End: 2020-11-14
Payer: MEDICARE

## 2020-11-14 DIAGNOSIS — R19.7 DIARRHEA, UNSPECIFIED TYPE: ICD-10-CM

## 2020-11-14 LAB — HEMOCCULT STL QL IA: NEGATIVE

## 2020-11-14 PROCEDURE — G0328 FECAL BLOOD SCRN IMMUNOASSAY: HCPCS

## 2020-11-16 LAB — G LAMBLIA AG STL QL IA: NEGATIVE

## 2020-11-18 ENCOUNTER — TELEPHONE (OUTPATIENT)
Dept: GASTROENTEROLOGY | Facility: CLINIC | Age: 85
End: 2020-11-18

## 2020-11-19 ENCOUNTER — TELEPHONE (OUTPATIENT)
Dept: GASTROENTEROLOGY | Facility: CLINIC | Age: 85
End: 2020-11-19

## 2020-11-27 ENCOUNTER — TELEPHONE (OUTPATIENT)
Dept: GASTROENTEROLOGY | Facility: CLINIC | Age: 85
End: 2020-11-27

## 2020-11-30 ENCOUNTER — APPOINTMENT (EMERGENCY)
Dept: CT IMAGING | Facility: HOSPITAL | Age: 85
End: 2020-11-30
Payer: MEDICARE

## 2020-11-30 ENCOUNTER — HOSPITAL ENCOUNTER (EMERGENCY)
Facility: HOSPITAL | Age: 85
Discharge: HOME | End: 2020-11-30
Attending: EMERGENCY MEDICINE | Admitting: EMERGENCY MEDICINE
Payer: MEDICARE

## 2020-11-30 VITALS
RESPIRATION RATE: 18 BRPM | SYSTOLIC BLOOD PRESSURE: 155 MMHG | WEIGHT: 164.9 LBS | OXYGEN SATURATION: 98 % | DIASTOLIC BLOOD PRESSURE: 72 MMHG | TEMPERATURE: 97.6 F | HEART RATE: 78 BPM | BODY MASS INDEX: 25.83 KG/M2
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
## 2020-11-30 DIAGNOSIS — M54.9 BACK PAIN: ICD-10-CM

## 2020-11-30 LAB
ALBUMIN SERPL BCP-MCNC: 3.7 G/DL (ref 3.5–5)
ALP SERPL-CCNC: 96 U/L (ref 46–116)
ALT SERPL W P-5'-P-CCNC: 25 U/L (ref 12–78)
ANION GAP SERPL CALCULATED.3IONS-SCNC: 10 MMOL/L (ref 4–13)
APTT PPP: 26 SECONDS (ref 23–37)
AST SERPL W P-5'-P-CCNC: 24 U/L (ref 5–45)
BASOPHILS # BLD AUTO: 0.01 THOUSANDS/ΜL (ref 0–0.1)
BASOPHILS NFR BLD AUTO: 0 % (ref 0–1)
BILIRUB DIRECT SERPL-MCNC: 0.15 MG/DL (ref 0–0.2)
BILIRUB SERPL-MCNC: 0.5 MG/DL (ref 0.2–1)
BUN SERPL-MCNC: 21 MG/DL (ref 5–25)
CALCIUM SERPL-MCNC: 10.5 MG/DL (ref 8.3–10.1)
CHLORIDE SERPL-SCNC: 104 MMOL/L (ref 100–108)
CO2 SERPL-SCNC: 25 MMOL/L (ref 21–32)
CREAT SERPL-MCNC: 1.13 MG/DL (ref 0.6–1.3)
EOSINOPHIL # BLD AUTO: 0.03 THOUSAND/ΜL (ref 0–0.61)
EOSINOPHIL NFR BLD AUTO: 0 % (ref 0–6)
ERYTHROCYTE [DISTWIDTH] IN BLOOD BY AUTOMATED COUNT: 12.6 % (ref 11.6–15.1)
GFR SERPL CREATININE-BSD FRML MDRD: 45 ML/MIN/1.73SQ M
GLUCOSE SERPL-MCNC: 109 MG/DL (ref 65–140)
HCT VFR BLD AUTO: 36.5 % (ref 34.8–46.1)
HGB BLD-MCNC: 12.2 G/DL (ref 11.5–15.4)
IMM GRANULOCYTES # BLD AUTO: 0.06 THOUSAND/UL (ref 0–0.2)
IMM GRANULOCYTES NFR BLD AUTO: 1 % (ref 0–2)
INR PPP: 1.09 (ref 0.84–1.19)
LACTATE SERPL-SCNC: 1.4 MMOL/L (ref 0.5–2)
LYMPHOCYTES # BLD AUTO: 0.71 THOUSANDS/ΜL (ref 0.6–4.47)
LYMPHOCYTES NFR BLD AUTO: 9 % (ref 14–44)
MCH RBC QN AUTO: 31.2 PG (ref 26.8–34.3)
MCHC RBC AUTO-ENTMCNC: 33.4 G/DL (ref 31.4–37.4)
MCV RBC AUTO: 93 FL (ref 82–98)
MONOCYTES # BLD AUTO: 0.51 THOUSAND/ΜL (ref 0.17–1.22)
MONOCYTES NFR BLD AUTO: 6 % (ref 4–12)
NEUTROPHILS # BLD AUTO: 7.01 THOUSANDS/ΜL (ref 1.85–7.62)
NEUTS SEG NFR BLD AUTO: 84 % (ref 43–75)
NRBC BLD AUTO-RTO: 0 /100 WBCS
PLATELET # BLD AUTO: 147 THOUSANDS/UL (ref 149–390)
PMV BLD AUTO: 10.2 FL (ref 8.9–12.7)
POTASSIUM SERPL-SCNC: 4 MMOL/L (ref 3.5–5.3)
PROT SERPL-MCNC: 6.6 G/DL (ref 6.4–8.2)
PROTHROMBIN TIME: 13.6 SECONDS (ref 11.6–14.5)
RBC # BLD AUTO: 3.91 MILLION/UL (ref 3.81–5.12)
SODIUM SERPL-SCNC: 139 MMOL/L (ref 136–145)
TROPONIN I SERPL-MCNC: <0.02 NG/ML
WBC # BLD AUTO: 8.33 THOUSAND/UL (ref 4.31–10.16)

## 2020-11-30 PROCEDURE — 80048 BASIC METABOLIC PNL TOTAL CA: CPT | Performed by: EMERGENCY MEDICINE

## 2020-11-30 PROCEDURE — 72125 CT NECK SPINE W/O DYE: CPT

## 2020-11-30 PROCEDURE — 74176 CT ABD & PELVIS W/O CONTRAST: CPT

## 2020-11-30 PROCEDURE — 80076 HEPATIC FUNCTION PANEL: CPT | Performed by: EMERGENCY MEDICINE

## 2020-11-30 PROCEDURE — 85730 THROMBOPLASTIN TIME PARTIAL: CPT | Performed by: EMERGENCY MEDICINE

## 2020-11-30 PROCEDURE — 99284 EMERGENCY DEPT VISIT MOD MDM: CPT | Performed by: EMERGENCY MEDICINE

## 2020-11-30 PROCEDURE — 85025 COMPLETE CBC W/AUTO DIFF WBC: CPT | Performed by: EMERGENCY MEDICINE

## 2020-11-30 PROCEDURE — 85610 PROTHROMBIN TIME: CPT | Performed by: EMERGENCY MEDICINE

## 2020-11-30 PROCEDURE — 84484 ASSAY OF TROPONIN QUANT: CPT | Performed by: EMERGENCY MEDICINE

## 2020-11-30 PROCEDURE — 70450 CT HEAD/BRAIN W/O DYE: CPT

## 2020-11-30 PROCEDURE — 71250 CT THORAX DX C-: CPT

## 2020-11-30 PROCEDURE — 83605 ASSAY OF LACTIC ACID: CPT | Performed by: EMERGENCY MEDICINE

## 2020-11-30 PROCEDURE — 36415 COLL VENOUS BLD VENIPUNCTURE: CPT | Performed by: EMERGENCY MEDICINE

## 2020-11-30 PROCEDURE — 99284 EMERGENCY DEPT VISIT MOD MDM: CPT

## 2020-11-30 PROCEDURE — G1004 CDSM NDSC: HCPCS

## 2020-11-30 PROCEDURE — 96360 HYDRATION IV INFUSION INIT: CPT

## 2020-11-30 RX ADMIN — SODIUM CHLORIDE 1000 ML: 0.9 INJECTION, SOLUTION INTRAVENOUS at 19:44

## 2020-12-01 ENCOUNTER — HOSPITAL ENCOUNTER (OUTPATIENT)
Dept: GASTROENTEROLOGY | Facility: HOSPITAL | Age: 85
Setting detail: OUTPATIENT SURGERY
Discharge: HOME | End: 2020-12-01
Attending: INTERNAL MEDICINE | Admitting: INTERNAL MEDICINE
Payer: MEDICARE

## 2020-12-01 ENCOUNTER — ANESTHESIA EVENT (OUTPATIENT)
Dept: GASTROENTEROLOGY | Facility: HOSPITAL | Age: 85
End: 2020-12-01

## 2020-12-01 ENCOUNTER — ANESTHESIA (OUTPATIENT)
Dept: GASTROENTEROLOGY | Facility: HOSPITAL | Age: 85
End: 2020-12-01

## 2020-12-01 VITALS
TEMPERATURE: 97 F | HEIGHT: 67 IN | RESPIRATION RATE: 18 BRPM | WEIGHT: 150.35 LBS | OXYGEN SATURATION: 96 % | SYSTOLIC BLOOD PRESSURE: 138 MMHG | DIASTOLIC BLOOD PRESSURE: 57 MMHG | BODY MASS INDEX: 23.6 KG/M2 | HEART RATE: 69 BPM

## 2020-12-01 VITALS — HEART RATE: 67 BPM

## 2020-12-01 DIAGNOSIS — K59.09 OTHER CONSTIPATION: ICD-10-CM

## 2020-12-01 DIAGNOSIS — R19.7 DIARRHEA, UNSPECIFIED TYPE: ICD-10-CM

## 2020-12-01 DIAGNOSIS — K59.01 SLOW TRANSIT CONSTIPATION: ICD-10-CM

## 2020-12-01 DIAGNOSIS — K59.00 CONSTIPATION, UNSPECIFIED CONSTIPATION TYPE: ICD-10-CM

## 2020-12-01 PROCEDURE — 88305 TISSUE EXAM BY PATHOLOGIST: CPT | Performed by: PATHOLOGY

## 2020-12-01 PROCEDURE — 45385 COLONOSCOPY W/LESION REMOVAL: CPT | Performed by: INTERNAL MEDICINE

## 2020-12-01 RX ORDER — SODIUM CHLORIDE, SODIUM LACTATE, POTASSIUM CHLORIDE, CALCIUM CHLORIDE 600; 310; 30; 20 MG/100ML; MG/100ML; MG/100ML; MG/100ML
125 INJECTION, SOLUTION INTRAVENOUS CONTINUOUS
Status: DISCONTINUED | OUTPATIENT
Start: 2020-12-01 | End: 2020-12-01

## 2020-12-01 RX ORDER — LIDOCAINE HYDROCHLORIDE 10 MG/ML
0.5 INJECTION, SOLUTION EPIDURAL; INFILTRATION; INTRACAUDAL; PERINEURAL ONCE AS NEEDED
Status: DISCONTINUED | OUTPATIENT
Start: 2020-12-01 | End: 2020-12-05 | Stop reason: HOSPADM

## 2020-12-01 RX ORDER — PROPOFOL 10 MG/ML
INJECTION, EMULSION INTRAVENOUS AS NEEDED
Status: DISCONTINUED | OUTPATIENT
Start: 2020-12-01 | End: 2020-12-01

## 2020-12-01 RX ORDER — LIDOCAINE HYDROCHLORIDE 10 MG/ML
INJECTION, SOLUTION EPIDURAL; INFILTRATION; INTRACAUDAL; PERINEURAL AS NEEDED
Status: DISCONTINUED | OUTPATIENT
Start: 2020-12-01 | End: 2020-12-01

## 2020-12-01 RX ADMIN — PROPOFOL 50 MG: 10 INJECTION, EMULSION INTRAVENOUS at 09:57

## 2020-12-01 RX ADMIN — PROPOFOL 10 MG: 10 INJECTION, EMULSION INTRAVENOUS at 10:01

## 2020-12-01 RX ADMIN — PROPOFOL 10 MG: 10 INJECTION, EMULSION INTRAVENOUS at 10:05

## 2020-12-01 RX ADMIN — PROPOFOL 20 MG: 10 INJECTION, EMULSION INTRAVENOUS at 10:09

## 2020-12-01 RX ADMIN — SODIUM CHLORIDE, SODIUM LACTATE, POTASSIUM CHLORIDE, AND CALCIUM CHLORIDE 125 ML/HR: .6; .31; .03; .02 INJECTION, SOLUTION INTRAVENOUS at 09:22

## 2020-12-01 RX ADMIN — PROPOFOL 20 MG: 10 INJECTION, EMULSION INTRAVENOUS at 10:00

## 2020-12-01 RX ADMIN — PROPOFOL 10 MG: 10 INJECTION, EMULSION INTRAVENOUS at 10:03

## 2020-12-01 RX ADMIN — PROPOFOL 20 MG: 10 INJECTION, EMULSION INTRAVENOUS at 09:58

## 2020-12-01 RX ADMIN — PROPOFOL 10 MG: 10 INJECTION, EMULSION INTRAVENOUS at 10:06

## 2020-12-01 RX ADMIN — LIDOCAINE HYDROCHLORIDE 50 MG: 10 INJECTION, SOLUTION EPIDURAL; INFILTRATION; INTRACAUDAL; PERINEURAL at 09:57

## 2020-12-02 ENCOUNTER — TELEPHONE (OUTPATIENT)
Dept: INTERNAL MEDICINE CLINIC | Facility: CLINIC | Age: 85
End: 2020-12-02

## 2020-12-04 ENCOUNTER — TELEPHONE (OUTPATIENT)
Dept: GASTROENTEROLOGY | Facility: CLINIC | Age: 85
End: 2020-12-04

## 2020-12-10 ENCOUNTER — TELEPHONE (OUTPATIENT)
Dept: GASTROENTEROLOGY | Facility: CLINIC | Age: 85
End: 2020-12-10

## 2020-12-16 ENCOUNTER — TELEPHONE (OUTPATIENT)
Dept: GASTROENTEROLOGY | Facility: CLINIC | Age: 85
End: 2020-12-16

## 2020-12-23 DIAGNOSIS — M19.90 ARTHRITIS: Primary | ICD-10-CM

## 2020-12-23 RX ORDER — TRAMADOL HYDROCHLORIDE 50 MG/1
50 TABLET ORAL EVERY 6 HOURS PRN
Qty: 120 TABLET | Refills: 3 | Status: SHIPPED | OUTPATIENT
Start: 2020-12-23 | End: 2021-01-26

## 2020-12-23 RX ORDER — TRAMADOL HYDROCHLORIDE 50 MG/1
50 TABLET ORAL EVERY 6 HOURS PRN
COMMUNITY
Start: 2020-12-21 | End: 2020-12-23 | Stop reason: SDUPTHER

## 2020-12-28 DIAGNOSIS — I10 HYPERTENSION, UNSPECIFIED TYPE: ICD-10-CM

## 2020-12-28 RX ORDER — ATENOLOL 25 MG/1
25 TABLET ORAL DAILY
Qty: 90 TABLET | Refills: 3 | Status: SHIPPED | OUTPATIENT
Start: 2020-12-28 | End: 2021-12-18

## 2020-12-30 ENCOUNTER — TELEPHONE (OUTPATIENT)
Dept: GASTROENTEROLOGY | Facility: CLINIC | Age: 85
End: 2020-12-30

## 2020-12-30 DIAGNOSIS — K59.00 CONSTIPATION, UNSPECIFIED CONSTIPATION TYPE: Primary | ICD-10-CM

## 2020-12-31 RX ORDER — LACTULOSE 10 G/15ML
20 SOLUTION ORAL DAILY PRN
Qty: 946 ML | Refills: 0 | Status: SHIPPED | OUTPATIENT
Start: 2020-12-31 | End: 2021-01-26

## 2021-01-04 LAB — MISCELLANEOUS LAB TEST RESULT: NORMAL

## 2021-01-06 ENCOUNTER — HOSPITAL ENCOUNTER (EMERGENCY)
Facility: HOSPITAL | Age: 86
Discharge: HOME | End: 2021-01-06
Attending: EMERGENCY MEDICINE | Admitting: EMERGENCY MEDICINE
Payer: MEDICARE

## 2021-01-06 ENCOUNTER — TELEMEDICINE (OUTPATIENT)
Dept: GASTROENTEROLOGY | Facility: CLINIC | Age: 86
End: 2021-01-06
Payer: MEDICARE

## 2021-01-06 ENCOUNTER — APPOINTMENT (EMERGENCY)
Dept: CT IMAGING | Facility: HOSPITAL | Age: 86
End: 2021-01-06
Payer: MEDICARE

## 2021-01-06 VITALS
HEART RATE: 98 BPM | RESPIRATION RATE: 18 BRPM | DIASTOLIC BLOOD PRESSURE: 82 MMHG | WEIGHT: 140 LBS | SYSTOLIC BLOOD PRESSURE: 184 MMHG | HEIGHT: 67 IN | OXYGEN SATURATION: 96 % | TEMPERATURE: 98.1 F | BODY MASS INDEX: 21.97 KG/M2

## 2021-01-06 DIAGNOSIS — K59.00 CONSTIPATION: Primary | ICD-10-CM

## 2021-01-06 DIAGNOSIS — K59.09 CHRONIC CONSTIPATION: Primary | ICD-10-CM

## 2021-01-06 LAB
ALBUMIN SERPL BCP-MCNC: 3.4 G/DL (ref 3.5–5)
ALP SERPL-CCNC: 121 U/L (ref 46–116)
ALT SERPL W P-5'-P-CCNC: 19 U/L (ref 12–78)
ANION GAP SERPL CALCULATED.3IONS-SCNC: 8 MMOL/L (ref 4–13)
APTT PPP: 28 SECONDS (ref 23–37)
AST SERPL W P-5'-P-CCNC: 17 U/L (ref 5–45)
BASOPHILS # BLD AUTO: 0.02 THOUSANDS/ΜL (ref 0–0.1)
BASOPHILS NFR BLD AUTO: 1 % (ref 0–1)
BILIRUB SERPL-MCNC: 0.4 MG/DL (ref 0.2–1)
BUN SERPL-MCNC: 11 MG/DL (ref 5–25)
CALCIUM ALBUM COR SERPL-MCNC: 11.1 MG/DL (ref 8.3–10.1)
CALCIUM SERPL-MCNC: 10.6 MG/DL (ref 8.3–10.1)
CHLORIDE SERPL-SCNC: 101 MMOL/L (ref 100–108)
CO2 SERPL-SCNC: 27 MMOL/L (ref 21–32)
CREAT SERPL-MCNC: 1.09 MG/DL (ref 0.6–1.3)
EOSINOPHIL # BLD AUTO: 0.03 THOUSAND/ΜL (ref 0–0.61)
EOSINOPHIL NFR BLD AUTO: 1 % (ref 0–6)
ERYTHROCYTE [DISTWIDTH] IN BLOOD BY AUTOMATED COUNT: 12.9 % (ref 11.6–15.1)
GFR SERPL CREATININE-BSD FRML MDRD: 47 ML/MIN/1.73SQ M
GLUCOSE SERPL-MCNC: 120 MG/DL (ref 65–140)
HCT VFR BLD AUTO: 36 % (ref 34.8–46.1)
HGB BLD-MCNC: 11.9 G/DL (ref 11.5–15.4)
IMM GRANULOCYTES # BLD AUTO: 0.01 THOUSAND/UL (ref 0–0.2)
IMM GRANULOCYTES NFR BLD AUTO: 0 % (ref 0–2)
INR PPP: 0.97 (ref 0.84–1.19)
LACTATE SERPL-SCNC: 1 MMOL/L (ref 0.5–2)
LYMPHOCYTES # BLD AUTO: 0.62 THOUSANDS/ΜL (ref 0.6–4.47)
LYMPHOCYTES NFR BLD AUTO: 15 % (ref 14–44)
MCH RBC QN AUTO: 30.7 PG (ref 26.8–34.3)
MCHC RBC AUTO-ENTMCNC: 33.1 G/DL (ref 31.4–37.4)
MCV RBC AUTO: 93 FL (ref 82–98)
MONOCYTES # BLD AUTO: 0.31 THOUSAND/ΜL (ref 0.17–1.22)
MONOCYTES NFR BLD AUTO: 8 % (ref 4–12)
NEUTROPHILS # BLD AUTO: 3.04 THOUSANDS/ΜL (ref 1.85–7.62)
NEUTS SEG NFR BLD AUTO: 75 % (ref 43–75)
NRBC BLD AUTO-RTO: 0 /100 WBCS
PLATELET # BLD AUTO: 155 THOUSANDS/UL (ref 149–390)
PMV BLD AUTO: 10 FL (ref 8.9–12.7)
POTASSIUM SERPL-SCNC: 4.1 MMOL/L (ref 3.5–5.3)
PROCALCITONIN SERPL-MCNC: <0.05 NG/ML
PROT SERPL-MCNC: 6.7 G/DL (ref 6.4–8.2)
PROTHROMBIN TIME: 13.1 SECONDS (ref 11.6–14.5)
RBC # BLD AUTO: 3.87 MILLION/UL (ref 3.81–5.12)
SODIUM SERPL-SCNC: 136 MMOL/L (ref 136–145)
TROPONIN I SERPL-MCNC: <0.02 NG/ML
WBC # BLD AUTO: 4.03 THOUSAND/UL (ref 4.31–10.16)

## 2021-01-06 PROCEDURE — 93005 ELECTROCARDIOGRAM TRACING: CPT

## 2021-01-06 PROCEDURE — 80053 COMPREHEN METABOLIC PANEL: CPT | Performed by: EMERGENCY MEDICINE

## 2021-01-06 PROCEDURE — 87040 BLOOD CULTURE FOR BACTERIA: CPT | Performed by: EMERGENCY MEDICINE

## 2021-01-06 PROCEDURE — 85610 PROTHROMBIN TIME: CPT | Performed by: EMERGENCY MEDICINE

## 2021-01-06 PROCEDURE — 85730 THROMBOPLASTIN TIME PARTIAL: CPT | Performed by: EMERGENCY MEDICINE

## 2021-01-06 PROCEDURE — 36415 COLL VENOUS BLD VENIPUNCTURE: CPT | Performed by: EMERGENCY MEDICINE

## 2021-01-06 PROCEDURE — 99284 EMERGENCY DEPT VISIT MOD MDM: CPT

## 2021-01-06 PROCEDURE — 83605 ASSAY OF LACTIC ACID: CPT | Performed by: EMERGENCY MEDICINE

## 2021-01-06 PROCEDURE — 84145 PROCALCITONIN (PCT): CPT | Performed by: EMERGENCY MEDICINE

## 2021-01-06 PROCEDURE — 99442 PR PHYS/QHP TELEPHONE EVALUATION 11-20 MIN: CPT | Performed by: PHYSICIAN ASSISTANT

## 2021-01-06 PROCEDURE — 85025 COMPLETE CBC W/AUTO DIFF WBC: CPT | Performed by: EMERGENCY MEDICINE

## 2021-01-06 PROCEDURE — 74177 CT ABD & PELVIS W/CONTRAST: CPT

## 2021-01-06 PROCEDURE — 84484 ASSAY OF TROPONIN QUANT: CPT | Performed by: EMERGENCY MEDICINE

## 2021-01-06 PROCEDURE — 99284 EMERGENCY DEPT VISIT MOD MDM: CPT | Performed by: EMERGENCY MEDICINE

## 2021-01-06 RX ORDER — ONDANSETRON 4 MG/1
4 TABLET, ORALLY DISINTEGRATING ORAL ONCE
Status: COMPLETED | OUTPATIENT
Start: 2021-01-06 | End: 2021-01-06

## 2021-01-06 RX ORDER — SODIUM CHLORIDE 9 MG/ML
3 INJECTION INTRAVENOUS
Status: DISCONTINUED | OUTPATIENT
Start: 2021-01-06 | End: 2021-01-07 | Stop reason: HOSPADM

## 2021-01-06 RX ORDER — ONDANSETRON 4 MG/1
4 TABLET, ORALLY DISINTEGRATING ORAL EVERY 6 HOURS PRN
Qty: 20 TABLET | Refills: 0 | Status: SHIPPED | OUTPATIENT
Start: 2021-01-06 | End: 2021-03-03

## 2021-01-06 RX ADMIN — ONDANSETRON 4 MG: 4 TABLET, ORALLY DISINTEGRATING ORAL at 21:32

## 2021-01-06 RX ADMIN — IOHEXOL 100 ML: 350 INJECTION, SOLUTION INTRAVENOUS at 17:49

## 2021-01-06 NOTE — PROGRESS NOTES
Virtual Brief Visit    Assessment/Plan:    1  Chronic Constipation    Patient has a history of chronic constipation  She was recently hospitalized for Back pain/T12 Compression fracture, an ileus, and Covid-19 last month  She had a colonoscopy 12/1 and a serrated adenoma was removed  She is currently taking Miralax, Metamucil, and Colace without sufficient relief of her constipation  She did not tolerate Linzess in the past but did previously respond favorably to Amitiza  Will restart Amitiza 24mcg po BID with food and continue the Metamucil and Colace BID  She will call to follow up with her progress  However of concern, she also noted during our phone visit that for the past 2 days she has had the development of severe dizziness/lightheadedness  She was recommended to go to the ER for an immediate evaluation  She will have someone drive her to the ER right away  Problem List Items Addressed This Visit     None      Visit Diagnoses     Chronic constipation    -  Primary            Reason for visit is No chief complaint on file  Encounter provider Sarah Boyd PA-C    Provider located at [address] [place] [address]    Recent Visits  Date Type Provider Dept   12/30/20 Telephone RIK Mcwilliams [facility]   Showing recent visits within past 7 days and meeting all other requirements     Today's Visits  Date Type Provider Dept   01/06/21 Telemedicine Sarah Boyd PA-C [facility]   Showing today's visits and meeting all other requirements     Future Appointments  No visits were found meeting these conditions  Showing future appointments within next 150 days and meeting all other requirements        After connecting through telephone, the patient was identified by name and date of birth   Shemar Urias was informed that this is a telemedicine visit and that the visit is being conducted through telephone  My office door was closed  No one else was in the room  She acknowledged consent and understanding of privacy and security of the platform  The patient has agreed to participate and understands she can discontinue the visit at any time  Patient is aware this is a billable service  Subjective    Christy Barrera is a 81 y o  female who presents via a telephone call to discuss her chronic constipation  Patient reports that last month she was hospitalized on multiple occasions  She has significant back pain secondary to a T12 compression fracture  She was also noted to have an ileus and positive for COVID-19  She reports that she went many days in the hospital without a bowel movement  She also has a prior longstanding history of chronic constipation  She is currently taking MiraLax twice a day common Metamucil daily, and Colace twice a day with insufficient relief  She also has tried some lactulose but reports she has only had minimal small loose stools with this regimen and does not feel as though she is fully evacuating her bowels  She reports associated abdominal bloating  In the past she did not tolerate Linzess  She did however respond favorably to Amitiza in the past   She recently had a colonoscopy the beginning of December and a serrated adenoma was removed  She also had a CT A/P while she was admitted to [facility]  She is taking Tramadol for her back pain which can cause constipation  Of concern, during our conversation she also mentions that over the past 2 days she has developed severe dizziness and lightheadedness upon trying to stand  She reports it is so severe she does not feel safe  She reports some nausea as well  She cannot clarify if she feels as though the room is spinning or not  She was recommended to have someone taker her to the ER for an immediate evaluation of this reported new problem      HPI     Past Medical History:   Diagnosis Date    Alopecia     Cardiac arrhythmia     Compression fracture of L1 lumbar vertebra (HCC)     resolved 09/2014    Occlusion of carotid artery     unspecified laterality resolved 08/05/2016       Past Surgical History:   Procedure Laterality Date    APPENDECTOMY      BACK SURGERY      BREAST BIOPSY Right     pt doesn't recall the year    COLONOSCOPY      resolved 2009    ESOPHAGOGASTRODUODENOSCOPY      mild gastritis resolved 2009    SHOULDER SURGERY      THROMBOENDARTERECTOMY Left     carotid, resloved 12/2012    TONSILLECTOMY         Current Outpatient Medications   Medication Sig Dispense Refill    alendronate (FOSAMAX) 35 mg tablet Take 1 tablet (35 mg total) by mouth every 7 days 12 tablet 3    atenolol (TENORMIN) 25 mg tablet Take 1 tablet (25 mg total) by mouth daily 90 tablet 3    atorvastatin (LIPITOR) 40 mg tablet Take 1 tablet (40 mg total) by mouth daily 90 tablet 3    bisacodyl (FLEET) 10 MG/30ML ENEM Insert 30 mL (10 mg total) into the rectum once as needed for constipation for up to 1 dose 30 mL 0    Calcium Carbonate-Vitamin D3 600-400 MG-UNIT TABS 1 tab daily      CRANBERRY SOFT PO Take by mouth      estradiol (ESTRACE VAGINAL) 0 1 mg/g vaginal cream       lactulose (CHRONULAC) 10 g/15 mL solution Take 30 mL (20 g total) by mouth daily as needed (constipation) 946 mL 0    lubiprostone (AMITIZA) 24 mcg capsule Take 1 capsule (24 mcg total) by mouth 2 (two) times a day with meals (Patient not taking: Reported on 7/17/2020) 180 capsule 3    multivitamin (THERAGRAN) TABS Take 1 tablet by mouth      nystatin-triamcinolone (MYCOLOG-II) ointment       Omega-3 Fatty Acids (FISH OIL) 500 MG CAPS Take 1 capsule by mouth daily      traMADol (ULTRAM) 50 mg tablet Take 1 tablet (50 mg total) by mouth every 6 (six) hours as needed for severe pain 120 tablet 3     No current facility-administered medications for this visit           Allergies   Allergen Reactions    Myrbetriq [Mirabegron] GI Intolerance and Hives    Latex Hives and Swelling    Barium Sulfate Diarrhea    Penicillins        There were no vitals filed for this visit  I spent 12 minutes directly with the patient during this visit    Maury acknowledges that she has consented to an online visit or consultation  She understands that the online visit is based solely on information provided by her, and that, in the absence of a face-to-face physical evaluation by the physician, the diagnosis she receives is both limited and provisional in terms of accuracy and completeness  This is not intended to replace a full medical face-to-face evaluation by the physician  Dexter Elliott understands and accepts these terms

## 2021-01-07 ENCOUNTER — TELEPHONE (OUTPATIENT)
Dept: INTERNAL MEDICINE CLINIC | Facility: CLINIC | Age: 86
End: 2021-01-07

## 2021-01-07 LAB
ATRIAL RATE: 85 BPM
P AXIS: 64 DEGREES
PR INTERVAL: 158 MS
QRS AXIS: 37 DEGREES
QRSD INTERVAL: 82 MS
QT INTERVAL: 368 MS
QTC INTERVAL: 437 MS
T WAVE AXIS: 29 DEGREES
VENTRICULAR RATE: 85 BPM

## 2021-01-07 PROCEDURE — 93010 ELECTROCARDIOGRAM REPORT: CPT | Performed by: INTERNAL MEDICINE

## 2021-01-07 NOTE — TELEPHONE ENCOUNTER
Attn:  Hayden or Emi  Pt would like another recommendation for another Gastro  Been going to [place] but no help for the past year    Call pt back at [phone]

## 2021-01-08 DIAGNOSIS — K59.01 SLOW TRANSIT CONSTIPATION: Primary | ICD-10-CM

## 2021-01-11 LAB
BACTERIA BLD CULT: NORMAL
BACTERIA BLD CULT: NORMAL

## 2021-01-11 NOTE — ED PROVIDER NOTES
History  Chief Complaint   Patient presents with    Constipation     constipation and back pain      81-year-old female presenting to the emergency department for evaluation of constipation  Patient has had constipation back pain, worsening for the past week, describes aching lower abdominal pain radiating to the back, heart stool/difficulty having bowel movement  No blood in his stool  No nausea vomiting diarrhea  No fevers or chills  Prior to Admission Medications   Prescriptions Last Dose Informant Patient Reported? Taking?    CRANBERRY SOFT PO  Self Yes No   Sig: Take by mouth   Calcium Carbonate-Vitamin D3 600-400 MG-UNIT TABS  Self Yes No   Si tab daily   Omega-3 Fatty Acids (FISH OIL) 500 MG CAPS   Yes No   Sig: Take 1 capsule by mouth daily   alendronate (FOSAMAX) 35 mg tablet  Self No No   Sig: Take 1 tablet (35 mg total) by mouth every 7 days   atenolol (TENORMIN) 25 mg tablet   No No   Sig: Take 1 tablet (25 mg total) by mouth daily   atorvastatin (LIPITOR) 40 mg tablet  Self No No   Sig: Take 1 tablet (40 mg total) by mouth daily   bisacodyl (FLEET) 10 MG/30ML ENEM   No No   Sig: Insert 30 mL (10 mg total) into the rectum once as needed for constipation for up to 1 dose   estradiol (ESTRACE VAGINAL) 0 1 mg/g vaginal cream  Self Yes No   lactulose (CHRONULAC) 10 g/15 mL solution   No No   Sig: Take 30 mL (20 g total) by mouth daily as needed (constipation)   lubiprostone (AMITIZA) 24 mcg capsule  Self No No   Sig: Take 1 capsule (24 mcg total) by mouth 2 (two) times a day with meals   Patient not taking: Reported on 2020   multivitamin (THERAGRAN) TABS  Self Yes No   Sig: Take 1 tablet by mouth   nystatin-triamcinolone (MYCOLOG-II) ointment  Self Yes No   traMADol (ULTRAM) 50 mg tablet   No No   Sig: Take 1 tablet (50 mg total) by mouth every 6 (six) hours as needed for severe pain      Facility-Administered Medications: None       Past Medical History:   Diagnosis Date    Alopecia  Cardiac arrhythmia     Compression fracture of L1 lumbar vertebra (HCC)     resolved 09/2014    Occlusion of carotid artery     unspecified laterality resolved 08/05/2016       Past Surgical History:   Procedure Laterality Date    APPENDECTOMY      BACK SURGERY      BREAST BIOPSY Right     pt doesn't recall the year    COLONOSCOPY      resolved 2009    ESOPHAGOGASTRODUODENOSCOPY      mild gastritis resolved 2009    SHOULDER SURGERY      THROMBOENDARTERECTOMY Left     carotid, resloved 12/2012    TONSILLECTOMY         Family History   Problem Relation Age of Onset    No Known Problems Mother     No Known Problems Sister     No Known Problems Daughter     No Known Problems Maternal Grandmother     No Known Problems Paternal Grandmother     No Known Problems Daughter      I have reviewed and agree with the history as documented  E-Cigarette/Vaping    E-Cigarette Use Never User      E-Cigarette/Vaping Substances     Social History     Tobacco Use    Smoking status: Never Smoker    Smokeless tobacco: Never Used   Substance Use Topics    Alcohol use: Not Currently     Comment: rarely (history); socially    Drug use: No       Review of Systems   Constitutional: Negative for appetite change, chills, fatigue and fever  HENT: Negative for sneezing and sore throat  Eyes: Negative for visual disturbance  Respiratory: Negative for cough, choking, chest tightness, shortness of breath and wheezing  Cardiovascular: Negative for chest pain and palpitations  Gastrointestinal: Positive for constipation  Negative for abdominal pain, diarrhea, nausea and vomiting  Genitourinary: Negative for difficulty urinating and dysuria  Neurological: Negative for dizziness, weakness, light-headedness, numbness and headaches  All other systems reviewed and are negative  Physical Exam  Physical Exam  Vitals signs and nursing note reviewed     Constitutional:       General: She is not in acute distress  Appearance: She is well-developed  She is not diaphoretic  HENT:      Head: Normocephalic and atraumatic  Eyes:      Pupils: Pupils are equal, round, and reactive to light  Neck:      Vascular: No JVD  Trachea: No tracheal deviation  Cardiovascular:      Rate and Rhythm: Normal rate and regular rhythm  Heart sounds: Normal heart sounds  No murmur  No friction rub  No gallop  Pulmonary:      Effort: Pulmonary effort is normal  No respiratory distress  Breath sounds: Normal breath sounds  No wheezing or rales  Abdominal:      General: Bowel sounds are normal  There is no distension  Palpations: Abdomen is soft  Tenderness: There is abdominal tenderness  There is no guarding or rebound  Skin:     General: Skin is warm and dry  Coloration: Skin is not pale  Neurological:      Mental Status: She is alert and oriented to person, place, and time  Cranial Nerves: No cranial nerve deficit  Motor: No abnormal muscle tone     Psychiatric:         Behavior: Behavior normal          Vital Signs  ED Triage Vitals [01/06/21 1620]   Temperature Pulse Respirations Blood Pressure SpO2   98 1 °F (36 7 °C) 105 22 122/94 96 %      Temp Source Heart Rate Source Patient Position - Orthostatic VS BP Location FiO2 (%)   Oral Monitor Sitting Right arm --      Pain Score       --           Vitals:    01/06/21 1620 01/06/21 1745 01/06/21 1830 01/06/21 2019   BP: 122/94 165/77 146/62 (!) 184/82   Pulse: 105 86 93 98   Patient Position - Orthostatic VS: Sitting Lying Lying Lying         Visual Acuity      ED Medications  Medications   iohexol (OMNIPAQUE) 350 MG/ML injection (SINGLE-DOSE) 100 mL (100 mL Intravenous Given 1/6/21 1749)   ondansetron (ZOFRAN-ODT) dispersible tablet 4 mg (4 mg Oral Given 1/6/21 2132)       Diagnostic Studies  Results Reviewed     Procedure Component Value Units Date/Time    Blood culture #1 [[id number]] Collected: 01/06/21 1714    Lab Status: Preliminary result Specimen: Blood from Arm, Right Updated: 01/10/21 2302     Blood Culture No Growth After 4 Days  Blood culture #2 [[id number]] Collected: 01/06/21 1714    Lab Status: Preliminary result Specimen: Blood from Arm, Left Updated: 01/10/21 2302     Blood Culture No Growth After 4 Days  Procalcitonin with AM Reflex [[id number]]  (Normal) Collected: 01/06/21 1714    Lab Status: Final result Specimen: Blood from Arm, Left Updated: 01/06/21 2348     Procalcitonin <0 05 ng/ml     Lactic Acid [[id number]]  (Normal) Collected: 01/06/21 1714    Lab Status: Final result Specimen: Blood from Arm, Left Updated: 01/06/21 1744     LACTIC ACID 1 0 mmol/L     Narrative:      Result may be elevated if tourniquet was used during collection      Troponin I [[id number]]  (Normal) Collected: 01/06/21 1714    Lab Status: Final result Specimen: Blood from Arm, Left Updated: 01/06/21 1743     Troponin I <0 02 ng/mL     Comprehensive metabolic panel [[id number]]  (Abnormal) Collected: 01/06/21 1714    Lab Status: Final result Specimen: Blood from Arm, Left Updated: 01/06/21 1742     Sodium 136 mmol/L      Potassium 4 1 mmol/L      Chloride 101 mmol/L      CO2 27 mmol/L      ANION GAP 8 mmol/L      BUN 11 mg/dL      Creatinine 1 09 mg/dL      Glucose 120 mg/dL      Calcium 10 6 mg/dL      Corrected Calcium 11 1 mg/dL      AST 17 U/L      ALT 19 U/L      Alkaline Phosphatase 121 U/L      Total Protein 6 7 g/dL      Albumin 3 4 g/dL      Total Bilirubin 0 40 mg/dL      eGFR 47 ml/min/1 73sq m     Narrative:      [place] guidelines for Chronic Kidney Disease (CKD):     Stage 1 with normal or high GFR (GFR > 90 mL/min/1 73 square meters)    Stage 2 Mild CKD (GFR = 60-89 mL/min/1 73 square meters)    Stage 3A Moderate CKD (GFR = 45-59 mL/min/1 73 square meters)    Stage 3B Moderate CKD (GFR = 30-44 mL/min/1 73 square meters)    Stage 4 Severe CKD (GFR = 15-29 mL/min/1 73 square meters)    Stage 5 End Stage CKD (GFR <15 mL/min/1 73 square meters)  Note: GFR calculation is accurate only with a steady state creatinine    Protime-INR [[id number]]  (Normal) Collected: 01/06/21 1714    Lab Status: Final result Specimen: Blood from Arm, Left Updated: 01/06/21 1736     Protime 13 1 seconds      INR 0 97    APTT [[id number]]  (Normal) Collected: 01/06/21 1714    Lab Status: Final result Specimen: Blood from Arm, Left Updated: 01/06/21 1736     PTT 28 seconds     CBC and differential [[id number]]  (Abnormal) Collected: 01/06/21 1714    Lab Status: Final result Specimen: Blood from Arm, Left Updated: 01/06/21 1722     WBC 4 03 Thousand/uL      RBC 3 87 Million/uL      Hemoglobin 11 9 g/dL      Hematocrit 36 0 %      MCV 93 fL      MCH 30 7 pg      MCHC 33 1 g/dL      RDW 12 9 %      MPV 10 0 fL      Platelets 706 Thousands/uL      nRBC 0 /100 WBCs      Neutrophils Relative 75 %      Immat GRANS % 0 %      Lymphocytes Relative 15 %      Monocytes Relative 8 %      Eosinophils Relative 1 %      Basophils Relative 1 %      Neutrophils Absolute 3 04 Thousands/µL      Immature Grans Absolute 0 01 Thousand/uL      Lymphocytes Absolute 0 62 Thousands/µL      Monocytes Absolute 0 31 Thousand/µL      Eosinophils Absolute 0 03 Thousand/µL      Basophils Absolute 0 02 Thousands/µL                  CT abdomen pelvis with contrast   Final Result by Yessenia Mendez MD (01/06 1825)      Progressive compression of the T12 vertebral body, now moderate in degree with acute components  Prominent stool throughout the colon  Large hiatal hernia  The study was marked in [facility] for immediate notification  Workstation performed: [id number]                    Procedures  Procedures         ED Course                             SBIRT 20yo+      Most Recent Value   SBIRT (22 yo +)   In order to provide better care to our patients, we are screening all of our patients for alcohol and drug use   Would it be okay to ask you these screening questions? Yes Filed at: 01/06/2021 1622   Initial Alcohol Screen: US AUDIT-C    1  How often do you have a drink containing alcohol?  0 Filed at: 01/06/2021 1622   2  How many drinks containing alcohol do you have on a typical day you are drinking? 0 Filed at: 01/06/2021 1622   3a  Male UNDER 65: How often do you have five or more drinks on one occasion? 0 Filed at: 01/06/2021 1622   3b  FEMALE Any Age, or MALE 65+: How often do you have 4 or more drinks on one occassion? 0 Filed at: 01/06/2021 1622   Audit-C Score  0 Filed at: 01/06/2021 1622   ISABELLA: How many times in the past year have you    Used an illegal drug or used a prescription medication for non-medical reasons? Never Filed at: 01/06/2021 1622                    MDM  Number of Diagnoses or Management Options  Constipation:   Diagnosis management comments: 80 yo f with constipation will check labs, ct, give fluids, possible enema       Disposition  Final diagnoses:   Constipation     Time reflects when diagnosis was documented in both MDM as applicable and the Disposition within this note     Time User Action Codes Description Comment    1/6/2021  9:25 PM Chandana Maher Add [K59 00] Constipation       ED Disposition     ED Disposition Condition Date/Time Comment    Discharge Stable Wed Jan 6, 2021  9:25 PM Shelly Poole discharge to home/self care  Follow-up Information     Follow up With Specialties Details Why Jorge Wright MD Gastroenterology   [address] [place] [address]  [phone]            Discharge Medication List as of 1/6/2021  9:32 PM      START taking these medications    Details   Glycerin, Laxative, 2 8 g SUPP Insert 1 suppository (2 8 g total) into the rectum 2 (two) times a day, Starting Wed 1/6/2021, Normal      ondansetron (ZOFRAN-ODT) 4 mg disintegrating tablet Take 1 tablet (4 mg total) by mouth every 6 (six) hours as needed for nausea or vomiting, Starting Wed 1/6/2021, Normal         CONTINUE these medications which have NOT CHANGED    Details   alendronate (FOSAMAX) 35 mg tablet Take 1 tablet (35 mg total) by mouth every 7 days, Starting Mon 9/23/2019, Normal      atenolol (TENORMIN) 25 mg tablet Take 1 tablet (25 mg total) by mouth daily, Starting Mon 12/28/2020, Normal      atorvastatin (LIPITOR) 40 mg tablet Take 1 tablet (40 mg total) by mouth daily, Starting Wed 11/6/2019, Normal      bisacodyl (FLEET) 10 MG/30ML ENEM Insert 30 mL (10 mg total) into the rectum once as needed for constipation for up to 1 dose, Starting Thu 12/31/2020, Normal      Calcium Carbonate-Vitamin D3 600-400 MG-UNIT TABS 1 tab daily, Historical Med      CRANBERRY SOFT PO Take by mouth, Historical Med      estradiol (ESTRACE VAGINAL) 0 1 mg/g vaginal cream Historical Med      lactulose (CHRONULAC) 10 g/15 mL solution Take 30 mL (20 g total) by mouth daily as needed (constipation), Starting Thu 12/31/2020, Until Mon 2/1/2021, Normal      lubiprostone (AMITIZA) 24 mcg capsule Take 1 capsule (24 mcg total) by mouth 2 (two) times a day with meals, Starting Mon 10/21/2019, Normal      multivitamin (THERAGRAN) TABS Take 1 tablet by mouth, Historical Med      nystatin-triamcinolone (MYCOLOG-II) ointment Historical Med      Omega-3 Fatty Acids (FISH OIL) 500 MG CAPS Take 1 capsule by mouth daily, Historical Med      traMADol (ULTRAM) 50 mg tablet Take 1 tablet (50 mg total) by mouth every 6 (six) hours as needed for severe pain, Starting Wed 12/23/2020, Until Thu 12/23/2021, Normal           No discharge procedures on file      PDMP Review     None          ED Provider  Electronically Signed by           Kaya Edwards MD  01/11/21 4927

## 2021-01-13 ENCOUNTER — TELEPHONE (OUTPATIENT)
Dept: INTERNAL MEDICINE CLINIC | Facility: CLINIC | Age: 86
End: 2021-01-13

## 2021-01-13 NOTE — TELEPHONE ENCOUNTER
Pt called pcp office stating she has been trying to get a hold of Dr Tray Fontaine office  Would like a call back to schedule an appointment       [phone]

## 2021-01-18 ENCOUNTER — TELEPHONE (OUTPATIENT)
Dept: PAIN MEDICINE | Facility: CLINIC | Age: 86
End: 2021-01-18

## 2021-01-18 ENCOUNTER — TELEPHONE (OUTPATIENT)
Dept: INTERNAL MEDICINE CLINIC | Facility: CLINIC | Age: 86
End: 2021-01-18

## 2021-01-18 DIAGNOSIS — G89.29 CHRONIC MIDLINE LOW BACK PAIN, UNSPECIFIED WHETHER SCIATICA PRESENT: ICD-10-CM

## 2021-01-18 DIAGNOSIS — M40.05 POSTURAL KYPHOSIS OF THORACOLUMBAR REGION: Primary | ICD-10-CM

## 2021-01-18 DIAGNOSIS — M54.50 CHRONIC MIDLINE LOW BACK PAIN, UNSPECIFIED WHETHER SCIATICA PRESENT: ICD-10-CM

## 2021-01-18 NOTE — TELEPHONE ENCOUNTER
Pt called she wants to know if Dr Johnathan Peña can refer her to an orthopedics? She fell first week of December and she had fracture vertebra  she was in the hospital for this

## 2021-01-20 ENCOUNTER — TELEPHONE (OUTPATIENT)
Dept: INTERNAL MEDICINE CLINIC | Facility: CLINIC | Age: 86
End: 2021-01-20

## 2021-01-20 NOTE — TELEPHONE ENCOUNTER
Patient called and wanted to let Dr Viktoriya Cordova know that the rx traMADol (ULTRAM) 50 mg tablet is making her feel dizzy and nauseous and feels like vomiting  Can something else be sent to her pharmacy for the pain? She still in a lot of pain          [facility] [place] [facility] [address] [facility]

## 2021-01-21 ENCOUNTER — TELEMEDICINE (OUTPATIENT)
Dept: INTERNAL MEDICINE CLINIC | Facility: CLINIC | Age: 86
End: 2021-01-21
Payer: MEDICARE

## 2021-01-21 DIAGNOSIS — S22.080G COMPRESSION FRACTURE OF T12 VERTEBRA WITH DELAYED HEALING: Primary | ICD-10-CM

## 2021-01-21 PROCEDURE — 99213 OFFICE O/P EST LOW 20 MIN: CPT | Performed by: INTERNAL MEDICINE

## 2021-01-21 NOTE — TELEPHONE ENCOUNTER
Notified patient  She said she needs something for the pain the one that she is using is making her sick

## 2021-01-21 NOTE — TELEPHONE ENCOUNTER
Patient stopped taking the med but she is still very feeling nausea and very dizzy and lightheaded, she is using a walker but when she gets up she feels like she is going to throw up  Dr Arturo Heart wanted her to let him know today how she was feeling

## 2021-01-22 NOTE — PROGRESS NOTES
Virtual Brief Visit    Assessment/Plan:    Problem List Items Addressed This Visit        Musculoskeletal and Integument    Compression fracture of T12 vertebra with delayed healing - Primary                Reason for visit is   Chief Complaint   Patient presents with    Virtual Brief Visit        Encounter provider Rox Mcgninis MD    Provider located at [address] [place] [address]    Recent Visits  Date Type Provider Dept   01/20/21 Telephone [facility]   01/18/21 Telephone Mary 84 recent visits within past 7 days and meeting all other requirements     Today's Visits  Date Type Provider Dept   01/21/21 Telemedicine Rox Mcginnis  [facility] today's visits and meeting all other requirements     Future Appointments  No visits were found meeting these conditions  Showing future appointments within next 150 days and meeting all other requirements        After connecting through telephone, the patient was identified by name and date of birth  Con Gonzalez was informed that this is a telemedicine visit and that the visit is being conducted through telephone  My office door was closed  No one else was in the room  She acknowledged consent and understanding of privacy and security of the platform  The patient has agreed to participate and understands she can discontinue the visit at any time  Patient is aware this is a billable service  Subjective    Con Gonzalez is a 81 y o  female   Left lower back pain radiating around towards the left hip  The patient has had this pain since she slipped and fell in her bathroom at home in early December  The pain has not relented  She has taken occasional Tylenol but otherwise nothing    I tried her on tramadol the other day but it is not tolerated secondary to nauea She was a set the [facility] Emergency Room where CT scan documented a new T12 compression fracture along with old L1 in 2 compressions  the pain limits her activities and range of motion     She is basically stuck at home  Comorbidity of chronic kidney disease stage 3 renders use of nonsteroidal anti-inflammatory drug problematic          Past Medical History:   Diagnosis Date    Alopecia     Cardiac arrhythmia     Compression fracture of L1 lumbar vertebra ([address] )     resolved 09/2014    Occlusion of carotid artery     unspecified laterality resolved 08/05/2016       Past Surgical History:   Procedure Laterality Date    APPENDECTOMY      BACK SURGERY      BREAST BIOPSY Right     pt doesn't recall the year    COLONOSCOPY      resolved 2009    ESOPHAGOGASTRODUODENOSCOPY      mild gastritis resolved 2009    SHOULDER SURGERY      THROMBOENDARTERECTOMY Left     carotid, resloved 12/2012    TONSILLECTOMY         Current Outpatient Medications   Medication Sig Dispense Refill    alendronate (FOSAMAX) 35 mg tablet Take 1 tablet (35 mg total) by mouth every 7 days 12 tablet 3    atenolol (TENORMIN) 25 mg tablet Take 1 tablet (25 mg total) by mouth daily 90 tablet 3    atorvastatin (LIPITOR) 40 mg tablet Take 1 tablet (40 mg total) by mouth daily 90 tablet 3    bisacodyl (FLEET) 10 MG/30ML ENEM Insert 30 mL (10 mg total) into the rectum once as needed for constipation for up to 1 dose 30 mL 0    Calcium Carbonate-Vitamin D3 600-400 MG-UNIT TABS 1 tab daily      CRANBERRY SOFT PO Take by mouth      estradiol (ESTRACE VAGINAL) 0 1 mg/g vaginal cream       Glycerin, Laxative, 2 8 g SUPP Insert 1 suppository (2 8 g total) into the rectum 2 (two) times a day 14 suppository 0    lactulose (CHRONULAC) 10 g/15 mL solution Take 30 mL (20 g total) by mouth daily as needed (constipation) 946 mL 0    lubiprostone (AMITIZA) 24 mcg capsule Take 1 capsule (24 mcg total) by mouth 2 (two) times a day with meals (Patient not taking: Reported on 7/17/2020) 180 capsule 3    multivitamin (THERAGRAN) TABS Take 1 tablet by mouth      nystatin-triamcinolone (MYCOLOG-II) ointment       Omega-3 Fatty Acids (FISH OIL) 500 MG CAPS Take 1 capsule by mouth daily      ondansetron (ZOFRAN-ODT) 4 mg disintegrating tablet Take 1 tablet (4 mg total) by mouth every 6 (six) hours as needed for nausea or vomiting 20 tablet 0    traMADol (ULTRAM) 50 mg tablet Take 1 tablet (50 mg total) by mouth every 6 (six) hours as needed for severe pain 120 tablet 3     No current facility-administered medications for this visit  Allergies   Allergen Reactions    Myrbetriq [Mirabegron] GI Intolerance and Hives    Latex Hives and Swelling    Barium Sulfate Diarrhea    Penicillins        Review of Systems   Musculoskeletal: Positive for arthralgias, back pain and gait problem  There were no vitals filed for this visit  I spent  10 minutes directly with the patient during this visit    Hunterlindsayjulee acknowledges that she has consented to an online visit or consultation  She understands that the online visit is based solely on information provided by her, and that, in the absence of a face-to-face physical evaluation by the physician, the diagnosis she receives is both limited and provisional in terms of accuracy and completeness  This is not intended to replace a full medical face-to-face evaluation by the physician  Cindy Murray understands and accepts these terms

## 2021-01-26 ENCOUNTER — CONSULT (OUTPATIENT)
Dept: PAIN MEDICINE | Facility: CLINIC | Age: 86
End: 2021-01-26
Payer: MEDICARE

## 2021-01-26 VITALS
RESPIRATION RATE: 16 BRPM | SYSTOLIC BLOOD PRESSURE: 148 MMHG | HEART RATE: 73 BPM | WEIGHT: 136.2 LBS | HEIGHT: 67 IN | DIASTOLIC BLOOD PRESSURE: 76 MMHG | BODY MASS INDEX: 21.38 KG/M2

## 2021-01-26 DIAGNOSIS — M54.50 CHRONIC LEFT-SIDED LOW BACK PAIN WITHOUT SCIATICA: ICD-10-CM

## 2021-01-26 DIAGNOSIS — S32.010A CLOSED COMPRESSION FRACTURE OF BODY OF L1 VERTEBRA (HCC): ICD-10-CM

## 2021-01-26 DIAGNOSIS — S32.050S CLOSED COMPRESSION FRACTURE OF L5 VERTEBRA, SEQUELA: ICD-10-CM

## 2021-01-26 DIAGNOSIS — S22.080S COMPRESSION FRACTURE OF T12 VERTEBRA, SEQUELA: ICD-10-CM

## 2021-01-26 DIAGNOSIS — M46.1 SACROILIITIS (HCC): ICD-10-CM

## 2021-01-26 DIAGNOSIS — K59.00 CONSTIPATION, UNSPECIFIED CONSTIPATION TYPE: ICD-10-CM

## 2021-01-26 DIAGNOSIS — G89.4 CHRONIC PAIN SYNDROME: Primary | ICD-10-CM

## 2021-01-26 DIAGNOSIS — M96.1 LUMBAR POST-LAMINECTOMY SYNDROME: ICD-10-CM

## 2021-01-26 DIAGNOSIS — G89.29 CHRONIC LEFT-SIDED LOW BACK PAIN WITHOUT SCIATICA: ICD-10-CM

## 2021-01-26 PROCEDURE — 99204 OFFICE O/P NEW MOD 45 MIN: CPT | Performed by: ANESTHESIOLOGY

## 2021-01-26 RX ORDER — LACTULOSE 10 G/15ML
SOLUTION ORAL
Qty: 946 ML | Refills: 0 | Status: SHIPPED | OUTPATIENT
Start: 2021-01-26 | End: 2021-01-28

## 2021-01-26 NOTE — H&P (VIEW-ONLY)
Assessment:  1  Chronic pain syndrome    2  Chronic left-sided low back pain without sciatica    3  Sacroiliitis ([address] )    4  Compression fracture of T12 vertebra, sequela    5  Closed compression fracture of body of L1 vertebra (HCC)    6  Closed compression fracture of L5 vertebra, sequela    7  Lumbar post-laminectomy syndrome        Plan:  Orders Placed This Encounter   Procedures    FL spine and pain procedure     4 week follow up after injection     Standing Status:   Future     Standing Expiration Date:   1/26/2025     Order Specific Question:   Reason for Exam:     Answer:   Left sacroiliac joint injection     Order Specific Question:   Anticoagulant hold needed? Answer:   unknown    Ambulatory referral to Endocrinology     Standing Status:   Future     Standing Expiration Date:   1/26/2022     Referral Priority:   Routine     Referral Type:   Consult - AMB     Referral Reason:   Specialty Services Required     Referred to Provider:   Abraham Sarabia MD     Requested Specialty:   Endocrinology     Number of Visits Requested:   1     Expiration Date:   1/26/2022   [place] Ambulatory referral to Orthopedic Surgery     Standing Status:   Future     Standing Expiration Date:   1/26/2022     Referral Priority:   Routine     Referral Type:   Consult - AMB     Referral Reason:   Specialty Services Required     Requested Specialty:   Orthopedic Surgery     Number of Visits Requested:   1     Expiration Date:   1/26/2022     My impressions and treatment recommendations were discussed in detail with the patient, who verbalized understanding and had no further questions  The patient has significant pain over the left sacroiliac joint  She does have RADHA positivity on the left  As such, I felt a reasonable to offer the patient a left sacroiliac joint injection since this could be potentially therapeutic  The procedures, its risks, and benefits were explained in detail to the patient   Risks include but are not limited to bleeding, infection, hematoma formation, abscess formation, weakness, headache, failure the pain to improve, nerve irritation or damage, and potential worsening of the pain  The patient verbalized understanding and wished to proceed with the procedure  In addition, the patient does have a acute compression fracture of T12 that appears to be worsening on serial CT scans  She also has chronic L1 and L5 compression fractures  I did feel reasonable to have the patient see Dr Victoriano Bhatt for an evaluation to see if this patient is a candidate for kyphoplasty  The patient was amenable to meeting with Dr Victoriano Bhatt  In addition, I did feel reasonable for the patient to be evaluated for the possibility of starting Prolia therapy  I have referred her to an endocrinologist at [facility] in this regard  Follow-up is planned in 4 weeks time or sooner as warranted  Discharge instructions were provided  I personally saw and examined the patient and I agree with the above discussed plan of care  History of Present Illness: Cindy Murray is a 81 y o  female who presents to [facility] for initial evaluation of the above stated pain complaints  The patient has a past medical and chronic pain history as outlined in the assessment section  She was self-referred  The patient reports a 3 month history of left-sided low back pain with radiation into the left hip region  She describes her pain after an injury on December 4, 2020  Her pain is moderate to severe an 8 to 9/10 on the verbal numerical pain rating scale  Her pain is nearly constant in nature and worse in the morning, afternoon, evening, and night  She describes her pain as sharp  She ambulates with the assistance of a cane and walker  Lying down, standing, bending, and walking increases pain    Oxycodone did provide her pain relief in the past   The patient is reporting that she did have a L3-L5 lumbar fusion surgery in the past as well  Review of Systems:    Review of Systems   Constitutional: Negative for fever and unexpected weight change  HENT: Negative for trouble swallowing  Eyes: Negative for visual disturbance  Respiratory: Negative for shortness of breath and wheezing  Cardiovascular: Negative for chest pain and palpitations  Gastrointestinal: Negative for constipation, diarrhea, nausea and vomiting  Endocrine: Negative for cold intolerance, heat intolerance and polydipsia  Genitourinary: Negative for difficulty urinating and frequency  Musculoskeletal: Negative for arthralgias, gait problem, joint swelling and myalgias  Skin: Negative for rash  Neurological: Negative for dizziness, seizures, syncope, weakness and headaches  Hematological: Does not bruise/bleed easily  Psychiatric/Behavioral: Negative for dysphoric mood  All other systems reviewed and are negative          Patient Active Problem List   Diagnosis    Arthritis    Hypertension, essential    Combined hyperlipidemia    Pre-operative cardiovascular examination    Microscopic hematuria    Cardiac arrhythmia    Anemia    Urinary retention    Bladder diverticulum    Bladder prolapse, female, acquired    Slow transit constipation    Age-related osteoporosis without current pathological fracture    Health care maintenance    Weight loss    Breast screening    Postural kyphosis of thoracolumbar region    Chest pressure    Compression fracture of T12 vertebra with delayed healing       Past Medical History:   Diagnosis Date    Alopecia     Cardiac arrhythmia     Compression fracture of L1 lumbar vertebra (HCC)     resolved 09/2014    Occlusion of carotid artery     unspecified laterality resolved 08/05/2016       Past Surgical History:   Procedure Laterality Date    APPENDECTOMY      BACK SURGERY      BREAST BIOPSY Right     pt doesn't recall the year    COLONOSCOPY      resolved 2009    ESOPHAGOGASTRODUODENOSCOPY      mild gastritis resolved 2009    SHOULDER SURGERY      THROMBOENDARTERECTOMY Left     carotid, resloved 12/2012    TONSILLECTOMY         Family History   Problem Relation Age of Onset    No Known Problems Mother     No Known Problems Sister     No Known Problems Daughter     No Known Problems Maternal Grandmother     No Known Problems Paternal Grandmother     No Known Problems Daughter        Social History     Occupational History    Not on file   Tobacco Use    Smoking status: Never Smoker    Smokeless tobacco: Never Used   Substance and Sexual Activity    Alcohol use: Not Currently     Comment: rarely (history); socially    Drug use: No    Sexual activity: Not Currently     Partners: Male         Current Outpatient Medications:     alendronate (FOSAMAX) 35 mg tablet, Take 1 tablet (35 mg total) by mouth every 7 days, Disp: 12 tablet, Rfl: 3    atenolol (TENORMIN) 25 mg tablet, Take 1 tablet (25 mg total) by mouth daily, Disp: 90 tablet, Rfl: 3    atorvastatin (LIPITOR) 40 mg tablet, Take 1 tablet (40 mg total) by mouth daily, Disp: 90 tablet, Rfl: 3    bisacodyl (FLEET) 10 MG/30ML ENEM, Insert 30 mL (10 mg total) into the rectum once as needed for constipation for up to 1 dose, Disp: 30 mL, Rfl: 0    Calcium Carbonate-Vitamin D3 600-400 MG-UNIT TABS, 1 tab daily, Disp: , Rfl:     CRANBERRY SOFT PO, Take by mouth, Disp: , Rfl:     estradiol (ESTRACE VAGINAL) 0 1 mg/g vaginal cream, , Disp: , Rfl:     Glycerin, Laxative, 2 8 g SUPP, Insert 1 suppository (2 8 g total) into the rectum 2 (two) times a day, Disp: 14 suppository, Rfl: 0    lactulose (CHRONULAC) 10 g/15 mL solution, TAKE 30 ML BY MOUTH DAILY AS NEEDED, Disp: 946 mL, Rfl: 0    lubiprostone (AMITIZA) 24 mcg capsule, Take 1 capsule (24 mcg total) by mouth 2 (two) times a day with meals, Disp: 180 capsule, Rfl: 3    multivitamin (THERAGRAN) TABS, Take 1 tablet by mouth, Disp: , Rfl:    nystatin-triamcinolone (MYCOLOG-II) ointment, , Disp: , Rfl:     Omega-3 Fatty Acids (FISH OIL) 500 MG CAPS, Take 1 capsule by mouth daily, Disp: , Rfl:     ondansetron (ZOFRAN-ODT) 4 mg disintegrating tablet, Take 1 tablet (4 mg total) by mouth every 6 (six) hours as needed for nausea or vomiting, Disp: 20 tablet, Rfl: 0    Allergies   Allergen Reactions    Myrbetriq [Mirabegron] GI Intolerance and Hives    Latex Hives and Swelling    Barium Sulfate Diarrhea    Penicillins     Tramadol Vomiting     Dizziness as well       Physical Exam:    /76   Pulse 73   Resp 16   Ht 5' 7" (1 702 m)   Wt 61 8 kg (136 lb 3 2 oz)   BMI 21 33 kg/m²     Constitutional: normal, well developed, well nourished, alert, in no distress and non-toxic and no overt pain behavior  Eyes: anicteric  HEENT: grossly intact  Neck: supple, symmetric, trachea midline and no masses   Pulmonary:even and unlabored  Cardiovascular:No edema or pitting edema present  Skin:Normal without rashes or lesions and well hydrated  Psychiatric:Mood and affect appropriate  Neurologic:Cranial Nerves II-XII grossly intact  Musculoskeletal:normal    Imaging    Interface, Rad Results In - 12/02/2020  1:40 PM EST  CT thoracic spine unenhanced    HISTORY: Trauma  Fall 2 days ago  COMPARISON: Lumbar spine x-rays 11/1/2018    TECHNIQUE: Axial sections were acquired through the thoracic spine without  intravenous contrast material  Images were reformatted in the sagittal and  coronal planes  Report: There is osteopenia  There is a minimal thoracolumbar levoscoliosis  There is an acute appearing minimal at T12 compression fracture with no spinal  canal compromising retropulsion  The remainder of the thoracic vertebral  segments are intact  Incompletely imaged is a moderate chronic inferior endplate  compression fracture at L1   On the coronal reconstructions, there is a large  obliquely oriented lucency coursing through the posterior lateral right seventh  rib  Although suspicious on the coronal reconstructions, this has more the  appearance of nutrient channel versus old trauma on axial images  Scarring and/or atelectasis are demonstrated at the lung bases  There is a  moderate hiatal hernia  There are coronary calcifications  There is biapical  scarring  There is a heterogeneous thyroid gland  Correlation with thyroid  function recommended  Top normal precarinal lymph nodes are noted  IMPRESSION:  IMPRESSION:    Minimal acute T12 compression fracture with no spinal canal compromising  retropulsion  See report  Interface, Rad Results In - 12/02/2020 11:48 AM EST  CT lumbar spine unenhanced    HISTORY: Fall 2 days ago  Worsening low back pain  COMPARISON: CT lumbar spine 11/1/2018    TECHNIQUE: Axial sections were acquired through the lumbar spine without  intravenous contrast dural  Images were reformatted in the sagittal and coronal  planes  Report: There is osteopenia  There is a grade 1 retrolisthesis of L1 on L2 and of L2 on  L3  There is a grade 2 anterolisthesis of L4 on L5  Similar findings were noted  previously  Not seen on prior exam from 2018 is a minimal compression fracture  at T12  This is age-indeterminate but may be acute/subacute  There is no spinal  canal compromising retropulsion  Redemonstrated is a chronic moderate inferior  compression fracture at L1  There is a chronic moderate to severe compression  fracture at L5  Redemonstrated are posterior fixation from L3 to L5 with a large  laminectomy at L3  There does not appear to be hardware failure  Spacers are  demonstrated at L3-4 and L4-5  On the coronal plane there is rightward  subluxation of L4 on L5 and there is no bony spinal stenosis  On soft tissue  windows, there is prominence of the epidural fat and disc bulging  This results  in moderate spinal stenosis  This is not significantly changed   There is  probable spinal stenosis, at least moderate at L4-5 due to uncovering of what  remains of the disc and the grade 2 anterolisthesis of L4 on L5  This is also  unchanged  There is minimal spinal stenosis at L5-S1 due to bulging and  ligamentum flavum hypertrophy  Severe bony foraminal narrowing is noted at  multiple levels, worse at L4-5 on the left and at L5-S1 on the right  There is severe atrophy of the posterior paraspinal muscles  There are SI joint  degenerative changes  IMPRESSION:  IMPRESSION:    Severe osteopenia with new age-indeterminate, perhaps acute/subacute minimal  compression fracture T12 with no spinal canal compromising retropulsion  Chronic  compression fractures at L1 and L5  Posterior fixation laminectomy changes  without evidence of hardware failure on today's exam  Multilevel spinal stenosis  and foraminal narrowing is not dissimilar to previous study  Chronic lung parenchymal changes at the lung bases    FL spine and pain procedure    (Results Pending)       Orders Placed This Encounter   Procedures    FL spine and pain procedure    Ambulatory referral to Endocrinology    Ambulatory referral to Orthopedic Surgery

## 2021-01-26 NOTE — PROGRESS NOTES
Assessment:  1  Chronic pain syndrome    2  Chronic left-sided low back pain without sciatica    3  Sacroiliitis ([address] )    4  Compression fracture of T12 vertebra, sequela    5  Closed compression fracture of body of L1 vertebra (HCC)    6  Closed compression fracture of L5 vertebra, sequela    7  Lumbar post-laminectomy syndrome        Plan:  Orders Placed This Encounter   Procedures    FL spine and pain procedure     4 week follow up after injection     Standing Status:   Future     Standing Expiration Date:   1/26/2025     Order Specific Question:   Reason for Exam:     Answer:   Left sacroiliac joint injection     Order Specific Question:   Anticoagulant hold needed? Answer:   unknown    Ambulatory referral to Endocrinology     Standing Status:   Future     Standing Expiration Date:   1/26/2022     Referral Priority:   Routine     Referral Type:   Consult - AMB     Referral Reason:   Specialty Services Required     Referred to Provider:   Machelle Haq MD     Requested Specialty:   Endocrinology     Number of Visits Requested:   1     Expiration Date:   1/26/2022   [facility] Ambulatory referral to Orthopedic Surgery     Standing Status:   Future     Standing Expiration Date:   1/26/2022     Referral Priority:   Routine     Referral Type:   Consult - AMB     Referral Reason:   Specialty Services Required     Requested Specialty:   Orthopedic Surgery     Number of Visits Requested:   1     Expiration Date:   1/26/2022     My impressions and treatment recommendations were discussed in detail with the patient, who verbalized understanding and had no further questions  The patient has significant pain over the left sacroiliac joint  She does have RADHA positivity on the left  As such, I felt a reasonable to offer the patient a left sacroiliac joint injection since this could be potentially therapeutic  The procedures, its risks, and benefits were explained in detail to the patient   Risks include but are not limited to bleeding, infection, hematoma formation, abscess formation, weakness, headache, failure the pain to improve, nerve irritation or damage, and potential worsening of the pain  The patient verbalized understanding and wished to proceed with the procedure  In addition, the patient does have a acute compression fracture of T12 that appears to be worsening on serial CT scans  She also has chronic L1 and L5 compression fractures  I did feel reasonable to have the patient see Dr Alanna Cortez for an evaluation to see if this patient is a candidate for kyphoplasty  The patient was amenable to meeting with Dr Alanna Cortez  In addition, I did feel reasonable for the patient to be evaluated for the possibility of starting Prolia therapy  I have referred her to an endocrinologist at [facility] in this regard  Follow-up is planned in 4 weeks time or sooner as warranted  Discharge instructions were provided  I personally saw and examined the patient and I agree with the above discussed plan of care  History of Present Illness: Sourav Butler is a 81 y o  female who presents to [facility] for initial evaluation of the above stated pain complaints  The patient has a past medical and chronic pain history as outlined in the assessment section  She was self-referred  The patient reports a 3 month history of left-sided low back pain with radiation into the left hip region  She describes her pain after an injury on December 4, 2020  Her pain is moderate to severe an 8 to 9/10 on the verbal numerical pain rating scale  Her pain is nearly constant in nature and worse in the morning, afternoon, evening, and night  She describes her pain as sharp  She ambulates with the assistance of a cane and walker  Lying down, standing, bending, and walking increases pain    Oxycodone did provide her pain relief in the past   The patient is reporting that she did have a L3-L5 lumbar fusion surgery in the past as well  Review of Systems:    Review of Systems   Constitutional: Negative for fever and unexpected weight change  HENT: Negative for trouble swallowing  Eyes: Negative for visual disturbance  Respiratory: Negative for shortness of breath and wheezing  Cardiovascular: Negative for chest pain and palpitations  Gastrointestinal: Negative for constipation, diarrhea, nausea and vomiting  Endocrine: Negative for cold intolerance, heat intolerance and polydipsia  Genitourinary: Negative for difficulty urinating and frequency  Musculoskeletal: Negative for arthralgias, gait problem, joint swelling and myalgias  Skin: Negative for rash  Neurological: Negative for dizziness, seizures, syncope, weakness and headaches  Hematological: Does not bruise/bleed easily  Psychiatric/Behavioral: Negative for dysphoric mood  All other systems reviewed and are negative          Patient Active Problem List   Diagnosis    Arthritis    Hypertension, essential    Combined hyperlipidemia    Pre-operative cardiovascular examination    Microscopic hematuria    Cardiac arrhythmia    Anemia    Urinary retention    Bladder diverticulum    Bladder prolapse, female, acquired    Slow transit constipation    Age-related osteoporosis without current pathological fracture    Health care maintenance    Weight loss    Breast screening    Postural kyphosis of thoracolumbar region    Chest pressure    Compression fracture of T12 vertebra with delayed healing       Past Medical History:   Diagnosis Date    Alopecia     Cardiac arrhythmia     Compression fracture of L1 lumbar vertebra (HCC)     resolved 09/2014    Occlusion of carotid artery     unspecified laterality resolved 08/05/2016       Past Surgical History:   Procedure Laterality Date    APPENDECTOMY      BACK SURGERY      BREAST BIOPSY Right     pt doesn't recall the year    COLONOSCOPY      resolved 2009    ESOPHAGOGASTRODUODENOSCOPY      mild gastritis resolved 2009    SHOULDER SURGERY      THROMBOENDARTERECTOMY Left     carotid, resloved 12/2012    TONSILLECTOMY         Family History   Problem Relation Age of Onset    No Known Problems Mother     No Known Problems Sister     No Known Problems Daughter     No Known Problems Maternal Grandmother     No Known Problems Paternal Grandmother     No Known Problems Daughter        Social History     Occupational History    Not on file   Tobacco Use    Smoking status: Never Smoker    Smokeless tobacco: Never Used   Substance and Sexual Activity    Alcohol use: Not Currently     Comment: rarely (history); socially    Drug use: No    Sexual activity: Not Currently     Partners: Male         Current Outpatient Medications:     alendronate (FOSAMAX) 35 mg tablet, Take 1 tablet (35 mg total) by mouth every 7 days, Disp: 12 tablet, Rfl: 3    atenolol (TENORMIN) 25 mg tablet, Take 1 tablet (25 mg total) by mouth daily, Disp: 90 tablet, Rfl: 3    atorvastatin (LIPITOR) 40 mg tablet, Take 1 tablet (40 mg total) by mouth daily, Disp: 90 tablet, Rfl: 3    bisacodyl (FLEET) 10 MG/30ML ENEM, Insert 30 mL (10 mg total) into the rectum once as needed for constipation for up to 1 dose, Disp: 30 mL, Rfl: 0    Calcium Carbonate-Vitamin D3 600-400 MG-UNIT TABS, 1 tab daily, Disp: , Rfl:     CRANBERRY SOFT PO, Take by mouth, Disp: , Rfl:     estradiol (ESTRACE VAGINAL) 0 1 mg/g vaginal cream, , Disp: , Rfl:     Glycerin, Laxative, 2 8 g SUPP, Insert 1 suppository (2 8 g total) into the rectum 2 (two) times a day, Disp: 14 suppository, Rfl: 0    lactulose (CHRONULAC) 10 g/15 mL solution, TAKE 30 ML BY MOUTH DAILY AS NEEDED, Disp: 946 mL, Rfl: 0    lubiprostone (AMITIZA) 24 mcg capsule, Take 1 capsule (24 mcg total) by mouth 2 (two) times a day with meals, Disp: 180 capsule, Rfl: 3    multivitamin (THERAGRAN) TABS, Take 1 tablet by mouth, Disp: , Rfl:    nystatin-triamcinolone (MYCOLOG-II) ointment, , Disp: , Rfl:     Omega-3 Fatty Acids (FISH OIL) 500 MG CAPS, Take 1 capsule by mouth daily, Disp: , Rfl:     ondansetron (ZOFRAN-ODT) 4 mg disintegrating tablet, Take 1 tablet (4 mg total) by mouth every 6 (six) hours as needed for nausea or vomiting, Disp: 20 tablet, Rfl: 0    Allergies   Allergen Reactions    Myrbetriq [Mirabegron] GI Intolerance and Hives    Latex Hives and Swelling    Barium Sulfate Diarrhea    Penicillins     Tramadol Vomiting     Dizziness as well       Physical Exam:    /76   Pulse 73   Resp 16   Ht 5' 7" (1 702 m)   Wt 61 8 kg (136 lb 3 2 oz)   BMI 21 33 kg/m²     Constitutional: normal, well developed, well nourished, alert, in no distress and non-toxic and no overt pain behavior  Eyes: anicteric  HEENT: grossly intact  Neck: supple, symmetric, trachea midline and no masses   Pulmonary:even and unlabored  Cardiovascular:No edema or pitting edema present  Skin:Normal without rashes or lesions and well hydrated  Psychiatric:Mood and affect appropriate  Neurologic:Cranial Nerves II-XII grossly intact  Musculoskeletal:normal    Imaging    Interface, Rad Results In - 12/02/2020  1:40 PM EST  CT thoracic spine unenhanced    HISTORY: Trauma  Fall 2 days ago  COMPARISON: Lumbar spine x-rays 11/1/2018    TECHNIQUE: Axial sections were acquired through the thoracic spine without  intravenous contrast material  Images were reformatted in the sagittal and  coronal planes  Report: There is osteopenia  There is a minimal thoracolumbar levoscoliosis  There is an acute appearing minimal at T12 compression fracture with no spinal  canal compromising retropulsion  The remainder of the thoracic vertebral  segments are intact  Incompletely imaged is a moderate chronic inferior endplate  compression fracture at L1   On the coronal reconstructions, there is a large  obliquely oriented lucency coursing through the posterior lateral right seventh  rib  Although suspicious on the coronal reconstructions, this has more the  appearance of nutrient channel versus old trauma on axial images  Scarring and/or atelectasis are demonstrated at the lung bases  There is a  moderate hiatal hernia  There are coronary calcifications  There is biapical  scarring  There is a heterogeneous thyroid gland  Correlation with thyroid  function recommended  Top normal precarinal lymph nodes are noted  IMPRESSION:  IMPRESSION:    Minimal acute T12 compression fracture with no spinal canal compromising  retropulsion  See report  Interface, Rad Results In - 12/02/2020 11:48 AM EST  CT lumbar spine unenhanced    HISTORY: Fall 2 days ago  Worsening low back pain  COMPARISON: CT lumbar spine 11/1/2018    TECHNIQUE: Axial sections were acquired through the lumbar spine without  intravenous contrast dural  Images were reformatted in the sagittal and coronal  planes  Report: There is osteopenia  There is a grade 1 retrolisthesis of L1 on L2 and of L2 on  L3  There is a grade 2 anterolisthesis of L4 on L5  Similar findings were noted  previously  Not seen on prior exam from 2018 is a minimal compression fracture  at T12  This is age-indeterminate but may be acute/subacute  There is no spinal  canal compromising retropulsion  Redemonstrated is a chronic moderate inferior  compression fracture at L1  There is a chronic moderate to severe compression  fracture at L5  Redemonstrated are posterior fixation from L3 to L5 with a large  laminectomy at L3  There does not appear to be hardware failure  Spacers are  demonstrated at L3-4 and L4-5  On the coronal plane there is rightward  subluxation of L4 on L5 and there is no bony spinal stenosis  On soft tissue  windows, there is prominence of the epidural fat and disc bulging  This results  in moderate spinal stenosis  This is not significantly changed   There is  probable spinal stenosis, at least moderate at L4-5 due to uncovering of what  remains of the disc and the grade 2 anterolisthesis of L4 on L5  This is also  unchanged  There is minimal spinal stenosis at L5-S1 due to bulging and  ligamentum flavum hypertrophy  Severe bony foraminal narrowing is noted at  multiple levels, worse at L4-5 on the left and at L5-S1 on the right  There is severe atrophy of the posterior paraspinal muscles  There are SI joint  degenerative changes  IMPRESSION:  IMPRESSION:    Severe osteopenia with new age-indeterminate, perhaps acute/subacute minimal  compression fracture T12 with no spinal canal compromising retropulsion  Chronic  compression fractures at L1 and L5  Posterior fixation laminectomy changes  without evidence of hardware failure on today's exam  Multilevel spinal stenosis  and foraminal narrowing is not dissimilar to previous study  Chronic lung parenchymal changes at the lung bases    FL spine and pain procedure    (Results Pending)       Orders Placed This Encounter   Procedures    FL spine and pain procedure    Ambulatory referral to Endocrinology    Ambulatory referral to Orthopedic Surgery

## 2021-01-27 ENCOUNTER — HOSPITAL ENCOUNTER (OUTPATIENT)
Dept: RADIOLOGY | Facility: CLINIC | Age: 86
Discharge: HOME | End: 2021-01-27
Attending: ANESTHESIOLOGY
Payer: MEDICARE

## 2021-01-27 VITALS
OXYGEN SATURATION: 95 % | HEART RATE: 79 BPM | DIASTOLIC BLOOD PRESSURE: 68 MMHG | TEMPERATURE: 97.8 F | RESPIRATION RATE: 20 BRPM | SYSTOLIC BLOOD PRESSURE: 125 MMHG

## 2021-01-27 DIAGNOSIS — M54.50 CHRONIC LEFT-SIDED LOW BACK PAIN WITHOUT SCIATICA: ICD-10-CM

## 2021-01-27 DIAGNOSIS — G89.4 CHRONIC PAIN SYNDROME: ICD-10-CM

## 2021-01-27 DIAGNOSIS — G89.29 CHRONIC LEFT-SIDED LOW BACK PAIN WITHOUT SCIATICA: ICD-10-CM

## 2021-01-27 DIAGNOSIS — M46.1 SACROILIITIS (HCC): ICD-10-CM

## 2021-01-27 PROCEDURE — 27096 INJECT SACROILIAC JOINT: CPT | Performed by: ANESTHESIOLOGY

## 2021-01-27 RX ORDER — BUPIVACAINE HCL/PF 2.5 MG/ML
5 VIAL (ML) INJECTION ONCE
Status: COMPLETED | OUTPATIENT
Start: 2021-01-27 | End: 2021-01-27

## 2021-01-27 RX ORDER — METHYLPREDNISOLONE ACETATE 80 MG/ML
80 INJECTION, SUSPENSION INTRA-ARTICULAR; INTRALESIONAL; INTRAMUSCULAR; PARENTERAL; SOFT TISSUE ONCE
Status: COMPLETED | OUTPATIENT
Start: 2021-01-27 | End: 2021-01-27

## 2021-01-27 RX ORDER — LIDOCAINE HYDROCHLORIDE 10 MG/ML
5 INJECTION, SOLUTION EPIDURAL; INFILTRATION; INTRACAUDAL; PERINEURAL ONCE
Status: COMPLETED | OUTPATIENT
Start: 2021-01-27 | End: 2021-01-27

## 2021-01-27 RX ADMIN — METHYLPREDNISOLONE ACETATE 80 MG: 80 INJECTION, SUSPENSION INTRA-ARTICULAR; INTRALESIONAL; INTRAMUSCULAR; PARENTERAL; SOFT TISSUE at 14:48

## 2021-01-27 RX ADMIN — LIDOCAINE HYDROCHLORIDE 5 ML: 10 INJECTION, SOLUTION EPIDURAL; INFILTRATION; INTRACAUDAL; PERINEURAL at 14:46

## 2021-01-27 RX ADMIN — Medication 2 ML: at 14:47

## 2021-01-27 RX ADMIN — IOHEXOL 1 ML: 300 INJECTION, SOLUTION INTRAVENOUS at 14:47

## 2021-01-27 NOTE — DISCHARGE INSTR - LAB
Steroid Joint Injection   WHAT YOU NEED TO KNOW:   A steroid joint injection is a procedure to inject steroid medicine into a joint  Steroid medicine decreases pain and inflammation  The injection may also contain an anesthetic (numbing medicine) to decrease pain  It may be done to treat conditions such as arthritis, gout, or carpal tunnel syndrome  The injections may be given in your knee, ankle, shoulder, elbow, wrist, ankle or sacroiliac joint  1  Do not apply heat to any area that is numb  If you have discomfort or soreness at the injection site, you may apply ice today, 20 minutes on and 20 minutes off  Tomorrow you may use ice or warm, moist heat  Do not apply ice or heat directly to the skin  2  You may have an increase or change in the discomfort for 36-48 hours after your treatment  Apply ice and continue with any pain medicine you have been prescribed  3  Do not do anything strenuous today  You may shower, but no tub baths or hot tubs today  You may resume your normal activities tomorrow, but do not overdo it  Resume normal activities slowly when you are feeling better  4  If you experience redness, drainage or swelling at the injection site, or if you develop a fever above 100 degrees, please call [facility] at [phone] or go to the Emergency Room  5  Continue to take all routine medicines prescribed by your primary care physician unless otherwise instructed by our staff  Most blood thinners should be started again according to your regularly scheduled dosing  If you have any questions, please give our office a call  If you have a problem specifically related to your procedure, please call our office at [phone]  Problems not related to your procedure should be directed to your primary care physician

## 2021-01-27 NOTE — INTERVAL H&P NOTE
Update: (This section must be completed if the H&P was completed greater than 24 hrs to procedure or admission)    H&P reviewed  After examining the patient, I find no changed to the H&P since it had been written  Patient re-evaluated   Accept as history and physical     Rosa Maria Godwin MD/January 27, 2021/2:34 PM

## 2021-01-28 ENCOUNTER — TELEPHONE (OUTPATIENT)
Dept: PAIN MEDICINE | Facility: CLINIC | Age: 86
End: 2021-01-28

## 2021-01-28 DIAGNOSIS — K59.00 CONSTIPATION, UNSPECIFIED CONSTIPATION TYPE: ICD-10-CM

## 2021-01-28 RX ORDER — LACTULOSE 10 G/15ML
SOLUTION ORAL; RECTAL
Qty: 2838 ML | Refills: 1 | Status: SHIPPED | OUTPATIENT
Start: 2021-01-28 | End: 2021-03-03

## 2021-01-28 NOTE — TELEPHONE ENCOUNTER
Dr Naa Stephens office is requesting an MRI? Do you need to provide me a script for MRI? She doesn't understand why they need MRI and Won did not? She is frazzled and she doesn't understand what is going on, please call her back to put her at ease  Thank you       [phone]

## 2021-01-28 NOTE — TELEPHONE ENCOUNTER
S/W pt who is s/p SIJ yesterday  Advised to give the injection more time to work, 3-5 days  Advised pt should see Dr Nimo Soto for possible treatment of fracture  Pt understood

## 2021-01-28 NOTE — TELEPHONE ENCOUNTER
Please review referral from Dr Carlos Salas, along with below notations and CT scans done for this patient  Are CT scan sufficient to schedule Consult?     Thank you

## 2021-01-28 NOTE — TELEPHONE ENCOUNTER
ES PT:  Please review below and advise  Referred to Dr Celia Matt at Consult for T12 compression fracture seen on CT of abdomen with contrast   Already addressed complaints about injection done yesterday

## 2021-01-28 NOTE — TELEPHONE ENCOUNTER
Patient states the injection did nothing she is in extreme pain, she wants to know the name of doctor you are referring her to if they are only going to look at her, she doesn't want to waste any more time, she will not go       Please advise,    Thank you    [phone]

## 2021-01-28 NOTE — TELEPHONE ENCOUNTER
Multiple CT scans and x-rays show her fracture  The CT of the abdomen/pelvis shows acute worsening of the T12 fracture which is why I sent her to see Dr Goran Velázquez

## 2021-02-03 ENCOUNTER — TELEPHONE (OUTPATIENT)
Dept: PAIN MEDICINE | Facility: CLINIC | Age: 86
End: 2021-02-03

## 2021-02-03 NOTE — TELEPHONE ENCOUNTER
Pt reports no improvement post inj   Pain level 7-8/10  Pt is scheduled with Dr Orlando Ford and does not want a 2wk call back

## 2021-02-08 ENCOUNTER — TELEPHONE (OUTPATIENT)
Dept: INTERNAL MEDICINE CLINIC | Facility: CLINIC | Age: 86
End: 2021-02-08

## 2021-02-08 NOTE — TELEPHONE ENCOUNTER
Stephen Barnett from occupational therapy called that the patient is doing great and was discharged on Friday 2/5/2021

## 2021-02-11 ENCOUNTER — TELEPHONE (OUTPATIENT)
Dept: INTERNAL MEDICINE CLINIC | Facility: CLINIC | Age: 86
End: 2021-02-11

## 2021-02-11 DIAGNOSIS — M81.0 AGE-RELATED OSTEOPOROSIS WITHOUT CURRENT PATHOLOGICAL FRACTURE: ICD-10-CM

## 2021-02-11 RX ORDER — ALENDRONATE SODIUM 35 MG/1
35 TABLET ORAL
Qty: 12 TABLET | Refills: 3 | Status: CANCELLED | OUTPATIENT
Start: 2021-02-11

## 2021-02-11 NOTE — TELEPHONE ENCOUNTER
When she was in the hospital they were putting calcium drops in her nostrils  She wants to know why they were putting them in her nostrils since she has not been on FOSAMAX for over a yr  Please advise

## 2021-02-16 ENCOUNTER — TELEPHONE (OUTPATIENT)
Dept: OBGYN CLINIC | Facility: HOSPITAL | Age: 86
End: 2021-02-16

## 2021-02-16 NOTE — TELEPHONE ENCOUNTER
Patient has an appointment schedule with Dr Marquis Thakkar tomorrow, 2/17  Patient is asking for a call back from someone who can assist her with directions on how to get into the [facility]  She is coming from [facility]

## 2021-02-17 ENCOUNTER — OFFICE VISIT (OUTPATIENT)
Dept: OBGYN CLINIC | Facility: CLINIC | Age: 86
End: 2021-02-17
Payer: MEDICARE

## 2021-02-17 VITALS — HEART RATE: 75 BPM | DIASTOLIC BLOOD PRESSURE: 82 MMHG | SYSTOLIC BLOOD PRESSURE: 162 MMHG

## 2021-02-17 DIAGNOSIS — S32.010A CLOSED COMPRESSION FRACTURE OF BODY OF L1 VERTEBRA (HCC): ICD-10-CM

## 2021-02-17 DIAGNOSIS — S32.050S CLOSED COMPRESSION FRACTURE OF L5 VERTEBRA, SEQUELA: ICD-10-CM

## 2021-02-17 DIAGNOSIS — M51.36 DDD (DEGENERATIVE DISC DISEASE), LUMBAR: Primary | ICD-10-CM

## 2021-02-17 DIAGNOSIS — S22.080A COMPRESSION FRACTURE OF T12 VERTEBRA, INITIAL ENCOUNTER (HCC): ICD-10-CM

## 2021-02-17 PROCEDURE — 99204 OFFICE O/P NEW MOD 45 MIN: CPT | Performed by: ORTHOPAEDIC SURGERY

## 2021-02-17 NOTE — PROGRESS NOTES
Assessment:   Diagnosis ICD-10-CM Associated Orders   1  DDD (degenerative disc disease), lumbar  M51 36 MRI lumbar spine w wo contrast   2  Compression fracture of T12 vertebra, initial encounter ([facility] [address] )  S22 080A Ambulatory referral to Orthopedic Surgery     BUN     Creatinine, serum     MRI lumbar spine w wo contrast   3  Closed compression fracture of body of L1 vertebra ([facility] [address] )  S32 010A Ambulatory referral to Orthopedic Surgery   4  Closed compression fracture of L5 vertebra, sequela  S32 050S Ambulatory referral to Orthopedic Surgery       Plan:    Patient has T12 compression fracture from fall 12/4/20  She also has history lumbar fusion L3-5  Patient is having midline, right lower segment and SI back pain with no radicular symptoms down her right leg  Will get MRI w/wo contrast to further evaluate to see if compression fracture is acute vs subacute and to further evaluate for lumbar ddd, radiculopathy  Neurologically intact  Continue with LSO brace  To do next visit:  Return for after MRI   The above stated was discussed in layman's terms and the patient expressed understanding  All questions were answered to the patient's satisfaction  Scribe Attestation    I,:  Usha Banuelos am acting as a scribe while in the presence of the attending physician :       I,:  Cadence Newsome MD personally performed the services described in this documentation    as scribed in my presence :             Subjective: Chula Mclean is a 81 y o  female who presents for evaluation of T12 compression fracture sustained 12/4/20 after fall  She was referred by Dr Jennifer Caldera who has been treating her recently for left sided chronic pain syndrome, sacroiliitis, T12 compression fracture, chronic L1 and L5 compression fracture  She did get left SI injection 1/27/21 which did not offer relief  Dr Jennifer Caldera was concerned about worsening T12 compression fracture on updated CT   She reports no pain in her legs, denies numbness or tingling  Worst pain is in her back  She is wearing LSO brace, TLSO to uncomfortable  She is starting prolia  History lumbar fusion L3-5 by Dr Natanael Gaitan  2 months after surgery she did break some rods which Dr Naatnael Gaitan discussed with her but since she really wasn't having pain no further surgery was needed  Review of systems negative unless otherwise specified in HPI  Review of Systems   Constitutional: Negative for chills and fever  HENT: Negative for ear pain and sore throat  Eyes: Negative for pain and visual disturbance  Respiratory: Negative for cough and shortness of breath  Cardiovascular: Negative for chest pain and palpitations  Gastrointestinal: Negative for abdominal pain and vomiting  Genitourinary: Negative for dysuria and hematuria  Musculoskeletal: Negative for arthralgias and back pain  Skin: Negative for color change and rash  Neurological: Negative for seizures and syncope  All other systems reviewed and are negative        Past Medical History:   Diagnosis Date    Alopecia     Cardiac arrhythmia     Compression fracture of L1 lumbar vertebra ([address] )     resolved 09/2014    Occlusion of carotid artery     unspecified laterality resolved 08/05/2016       Past Surgical History:   Procedure Laterality Date    APPENDECTOMY      BACK SURGERY      BREAST BIOPSY Right     pt doesn't recall the year    COLONOSCOPY      resolved 2009    ESOPHAGOGASTRODUODENOSCOPY      mild gastritis resolved 2009    SHOULDER SURGERY      THROMBOENDARTERECTOMY Left     carotid, resloved 12/2012    TONSILLECTOMY         Family History   Problem Relation Age of Onset    No Known Problems Mother     No Known Problems Sister     No Known Problems Daughter     No Known Problems Maternal Grandmother     No Known Problems Paternal Grandmother     No Known Problems Daughter        Social History     Occupational History    Not on file   Tobacco Use    Smoking status: Never Smoker    Smokeless tobacco: Never Used   Substance and Sexual Activity    Alcohol use: Not Currently     Comment: rarely (history); socially    Drug use: No    Sexual activity: Not Currently     Partners: Male         Current Outpatient Medications:     alendronate (FOSAMAX) 35 mg tablet, Take 1 tablet (35 mg total) by mouth every 7 days, Disp: 12 tablet, Rfl: 3    atenolol (TENORMIN) 25 mg tablet, Take 1 tablet (25 mg total) by mouth daily, Disp: 90 tablet, Rfl: 3    atorvastatin (LIPITOR) 40 mg tablet, Take 1 tablet (40 mg total) by mouth daily, Disp: 90 tablet, Rfl: 3    bisacodyl (FLEET) 10 MG/30ML ENEM, Insert 30 mL (10 mg total) into the rectum once as needed for constipation for up to 1 dose, Disp: 30 mL, Rfl: 0    Calcium Carbonate-Vitamin D3 600-400 MG-UNIT TABS, 1 tab daily, Disp: , Rfl:     CRANBERRY SOFT PO, Take by mouth, Disp: , Rfl:     Enulose 10 GM/15ML, TAKE 30 ML BY MOUTH DAILY AS NEEDED, Disp: 2838 mL, Rfl: 1    estradiol (ESTRACE VAGINAL) 0 1 mg/g vaginal cream, , Disp: , Rfl:     Glycerin, Laxative, 2 8 g SUPP, Insert 1 suppository (2 8 g total) into the rectum 2 (two) times a day, Disp: 14 suppository, Rfl: 0    lubiprostone (AMITIZA) 24 mcg capsule, Take 1 capsule (24 mcg total) by mouth 2 (two) times a day with meals, Disp: 180 capsule, Rfl: 3    multivitamin (THERAGRAN) TABS, Take 1 tablet by mouth, Disp: , Rfl:     nystatin-triamcinolone (MYCOLOG-II) ointment, , Disp: , Rfl:     Omega-3 Fatty Acids (FISH OIL) 500 MG CAPS, Take 1 capsule by mouth daily, Disp: , Rfl:     ondansetron (ZOFRAN-ODT) 4 mg disintegrating tablet, Take 1 tablet (4 mg total) by mouth every 6 (six) hours as needed for nausea or vomiting, Disp: 20 tablet, Rfl: 0    Allergies   Allergen Reactions    Myrbetriq [Mirabegron] GI Intolerance and Hives    Latex Hives and Swelling    Barium Sulfate Diarrhea    Penicillins     Tramadol Vomiting     Dizziness as well            Vitals:    02/17/21 1122   BP: 162/82 Pulse: 75       Objective:                    Back Exam     Comments:  Lumbar spine  No acute distress, skin intact  Non tender in area of T12 musculature , tenderness midline lower lumbar spine extending into right hip, right SI   L2-S1 5/5 strength   L2-S1 sensation intact and symmetric   Positive patricks test on right               Diagnostics, reviewed and taken today if performed as documented: The attending physician has personally reviewed the pertinent films in PACS and interpretation is as follows: CT lumbar progressive compression of T12 vertebral body now moderate in degree  Procedures, if performed today:    Procedures    None performed      Portions of the record may have been created with voice recognition software  Occasional wrong word or "sound a like" substitutions may have occurred due to the inherent limitations of voice recognition software  Read the chart carefully and recognize, using context, where substitutions have occurred

## 2021-02-18 ENCOUNTER — TELEPHONE (OUTPATIENT)
Dept: OTHER | Facility: OTHER | Age: 86
End: 2021-02-18

## 2021-02-18 DIAGNOSIS — E78.2 COMBINED HYPERLIPIDEMIA: ICD-10-CM

## 2021-02-19 RX ORDER — ATORVASTATIN CALCIUM 40 MG/1
40 TABLET, FILM COATED ORAL DAILY
Qty: 90 TABLET | Refills: 2 | Status: SHIPPED | OUTPATIENT
Start: 2021-02-19 | End: 2021-09-17

## 2021-02-19 NOTE — TELEPHONE ENCOUNTER
atorvastatin (LIPITOR) 40 mg tablet    Pt needs this ordered today for her    she is out of the med       [phone]

## 2021-02-23 ENCOUNTER — TELEPHONE (OUTPATIENT)
Dept: INTERNAL MEDICINE CLINIC | Facility: CLINIC | Age: 86
End: 2021-02-23

## 2021-02-23 ENCOUNTER — TELEPHONE (OUTPATIENT)
Dept: OBGYN CLINIC | Facility: HOSPITAL | Age: 86
End: 2021-02-23

## 2021-02-23 NOTE — TELEPHONE ENCOUNTER
Patient sees Dr Gerardo Peñaloza  Patient called because she has some questions about the MRI  She mentioned that the person who gave her all the instructions about the MRI, provided her with incorrect information     # [id number]

## 2021-02-23 NOTE — TELEPHONE ENCOUNTER
Spoke with patient and explained that it was not me that she spoke with on or around 1/29, but apologized for any inconvenience that the conversation may have presented  She was told that she did not need an MRI for her upcoming visit with Dr Sarah Parks, drove an hour in pain, just to find out that yes, she did need an mri and the doctor would not see her without it  I again apologized for any misinformation she was given and she seemed satisfied  Her MRI is scheduled on 3/4 with an ov on 3/10

## 2021-02-25 ENCOUNTER — TELEPHONE (OUTPATIENT)
Dept: INTERNAL MEDICINE CLINIC | Facility: CLINIC | Age: 86
End: 2021-02-25

## 2021-02-25 NOTE — TELEPHONE ENCOUNTER
CALLED PT   AND WAS NOTIFIED FORMS ARE NOT FILLED OUT AND STILL ON HIS DESK AND HE WILL NOT BE BACK TILL Tuesday 3/2/21 PLEASE ERIKA TO GET THESE FILLED OUT

## 2021-02-26 ENCOUNTER — LAB (OUTPATIENT)
Dept: LAB | Facility: CLINIC | Age: 86
End: 2021-02-26
Payer: MEDICARE

## 2021-02-26 ENCOUNTER — TELEPHONE (OUTPATIENT)
Dept: INTERNAL MEDICINE CLINIC | Facility: CLINIC | Age: 86
End: 2021-02-26

## 2021-02-26 DIAGNOSIS — S22.080A COMPRESSION FRACTURE OF T12 VERTEBRA, INITIAL ENCOUNTER (HCC): ICD-10-CM

## 2021-02-26 LAB
BUN SERPL-MCNC: 18 MG/DL (ref 5–25)
CREAT SERPL-MCNC: 1.01 MG/DL (ref 0.6–1.3)
GFR SERPL CREATININE-BSD FRML MDRD: 51 ML/MIN/1.73SQ M

## 2021-02-26 PROCEDURE — 82565 ASSAY OF CREATININE: CPT

## 2021-02-26 PROCEDURE — 84520 ASSAY OF UREA NITROGEN: CPT

## 2021-02-26 PROCEDURE — 36415 COLL VENOUS BLD VENIPUNCTURE: CPT

## 2021-03-03 ENCOUNTER — APPOINTMENT (OUTPATIENT)
Dept: LAB | Facility: CLINIC | Age: 86
End: 2021-03-03
Payer: MEDICARE

## 2021-03-03 ENCOUNTER — OFFICE VISIT (OUTPATIENT)
Dept: INTERNAL MEDICINE CLINIC | Facility: CLINIC | Age: 86
End: 2021-03-03
Payer: MEDICARE

## 2021-03-03 VITALS
OXYGEN SATURATION: 96 % | HEIGHT: 67 IN | TEMPERATURE: 98.1 F | WEIGHT: 130.8 LBS | DIASTOLIC BLOOD PRESSURE: 70 MMHG | SYSTOLIC BLOOD PRESSURE: 132 MMHG | HEART RATE: 89 BPM | BODY MASS INDEX: 20.53 KG/M2

## 2021-03-03 DIAGNOSIS — S22.080G COMPRESSION FRACTURE OF T12 VERTEBRA WITH DELAYED HEALING: ICD-10-CM

## 2021-03-03 DIAGNOSIS — M81.0 AGE-RELATED OSTEOPOROSIS WITHOUT CURRENT PATHOLOGICAL FRACTURE: ICD-10-CM

## 2021-03-03 DIAGNOSIS — I10 HYPERTENSION, ESSENTIAL: ICD-10-CM

## 2021-03-03 DIAGNOSIS — M40.05 POSTURAL KYPHOSIS OF THORACOLUMBAR REGION: Primary | ICD-10-CM

## 2021-03-03 DIAGNOSIS — E83.52 HYPERCALCEMIA: ICD-10-CM

## 2021-03-03 PROBLEM — R53.82 CHRONIC FATIGUE: Status: ACTIVE | Noted: 2021-03-03

## 2021-03-03 LAB
ALBUMIN SERPL BCP-MCNC: 3.5 G/DL (ref 3.5–5)
ALP SERPL-CCNC: 107 U/L (ref 46–116)
ALT SERPL W P-5'-P-CCNC: 19 U/L (ref 12–78)
ANION GAP SERPL CALCULATED.3IONS-SCNC: 7 MMOL/L (ref 4–13)
AST SERPL W P-5'-P-CCNC: 16 U/L (ref 5–45)
BASOPHILS # BLD AUTO: 0.03 THOUSANDS/ΜL (ref 0–0.1)
BASOPHILS NFR BLD AUTO: 1 % (ref 0–1)
BILIRUB SERPL-MCNC: 0.32 MG/DL (ref 0.2–1)
BUN SERPL-MCNC: 18 MG/DL (ref 5–25)
CA-I BLD-SCNC: 1.4 MMOL/L (ref 1.12–1.32)
CALCIUM SERPL-MCNC: 11.2 MG/DL (ref 8.3–10.1)
CHLORIDE SERPL-SCNC: 108 MMOL/L (ref 100–108)
CO2 SERPL-SCNC: 25 MMOL/L (ref 21–32)
CREAT SERPL-MCNC: 1.05 MG/DL (ref 0.6–1.3)
EOSINOPHIL # BLD AUTO: 0.03 THOUSAND/ΜL (ref 0–0.61)
EOSINOPHIL NFR BLD AUTO: 1 % (ref 0–6)
ERYTHROCYTE [DISTWIDTH] IN BLOOD BY AUTOMATED COUNT: 13.2 % (ref 11.6–15.1)
GFR SERPL CREATININE-BSD FRML MDRD: 49 ML/MIN/1.73SQ M
GLUCOSE P FAST SERPL-MCNC: 107 MG/DL (ref 65–99)
HCT VFR BLD AUTO: 36.7 % (ref 34.8–46.1)
HGB BLD-MCNC: 12.1 G/DL (ref 11.5–15.4)
IGA SERPL-MCNC: 194 MG/DL (ref 70–400)
IGG SERPL-MCNC: 576 MG/DL (ref 700–1600)
IGM SERPL-MCNC: 59 MG/DL (ref 40–230)
IMM GRANULOCYTES # BLD AUTO: 0.01 THOUSAND/UL (ref 0–0.2)
IMM GRANULOCYTES NFR BLD AUTO: 0 % (ref 0–2)
LYMPHOCYTES # BLD AUTO: 0.73 THOUSANDS/ΜL (ref 0.6–4.47)
LYMPHOCYTES NFR BLD AUTO: 16 % (ref 14–44)
MCH RBC QN AUTO: 31.1 PG (ref 26.8–34.3)
MCHC RBC AUTO-ENTMCNC: 33 G/DL (ref 31.4–37.4)
MCV RBC AUTO: 94 FL (ref 82–98)
MONOCYTES # BLD AUTO: 0.33 THOUSAND/ΜL (ref 0.17–1.22)
MONOCYTES NFR BLD AUTO: 7 % (ref 4–12)
NEUTROPHILS # BLD AUTO: 3.59 THOUSANDS/ΜL (ref 1.85–7.62)
NEUTS SEG NFR BLD AUTO: 75 % (ref 43–75)
NRBC BLD AUTO-RTO: 0 /100 WBCS
PLATELET # BLD AUTO: 277 THOUSANDS/UL (ref 149–390)
PMV BLD AUTO: 10.3 FL (ref 8.9–12.7)
POTASSIUM SERPL-SCNC: 4.4 MMOL/L (ref 3.5–5.3)
PROT SERPL-MCNC: 7.1 G/DL (ref 6.4–8.2)
PTH-INTACT SERPL-MCNC: 114.1 PG/ML (ref 18.4–80.1)
RBC # BLD AUTO: 3.89 MILLION/UL (ref 3.81–5.12)
SODIUM SERPL-SCNC: 140 MMOL/L (ref 136–145)
T3FREE SERPL-MCNC: 2.43 PG/ML (ref 2.3–4.2)
T4 FREE SERPL-MCNC: 1.12 NG/DL (ref 0.76–1.46)
TSH SERPL DL<=0.05 MIU/L-ACNC: 0.61 UIU/ML (ref 0.36–3.74)
WBC # BLD AUTO: 4.72 THOUSAND/UL (ref 4.31–10.16)

## 2021-03-03 PROCEDURE — 84481 FREE ASSAY (FT-3): CPT

## 2021-03-03 PROCEDURE — G0439 PPPS, SUBSEQ VISIT: HCPCS | Performed by: INTERNAL MEDICINE

## 2021-03-03 PROCEDURE — 36415 COLL VENOUS BLD VENIPUNCTURE: CPT

## 2021-03-03 PROCEDURE — 82330 ASSAY OF CALCIUM: CPT

## 2021-03-03 PROCEDURE — 84439 ASSAY OF FREE THYROXINE: CPT

## 2021-03-03 PROCEDURE — 84165 PROTEIN E-PHORESIS SERUM: CPT | Performed by: PATHOLOGY

## 2021-03-03 PROCEDURE — 86800 THYROGLOBULIN ANTIBODY: CPT

## 2021-03-03 PROCEDURE — 82397 CHEMILUMINESCENT ASSAY: CPT

## 2021-03-03 PROCEDURE — 83970 ASSAY OF PARATHORMONE: CPT

## 2021-03-03 PROCEDURE — 80053 COMPREHEN METABOLIC PANEL: CPT

## 2021-03-03 PROCEDURE — 85025 COMPLETE CBC W/AUTO DIFF WBC: CPT

## 2021-03-03 PROCEDURE — 86376 MICROSOMAL ANTIBODY EACH: CPT

## 2021-03-03 PROCEDURE — 99214 OFFICE O/P EST MOD 30 MIN: CPT | Performed by: INTERNAL MEDICINE

## 2021-03-03 PROCEDURE — 82784 ASSAY IGA/IGD/IGG/IGM EACH: CPT

## 2021-03-03 PROCEDURE — 84165 PROTEIN E-PHORESIS SERUM: CPT

## 2021-03-03 PROCEDURE — 84443 ASSAY THYROID STIM HORMONE: CPT

## 2021-03-03 NOTE — PROGRESS NOTES
Assessment/Plan:       Diagnoses and all orders for this visit:    Postural kyphosis of thoracolumbar region    Compression fracture of T12 vertebra with delayed healing  -     CBC and differential; Future  -     Comprehensive metabolic panel; Future  -     TSH, 3rd generation with Free T4 reflex; Future  -     UA (URINE) with reflex to Scope  -     T3, free; Future  -     T4, free; Future  -     Thyroid Antibodies Panel; Future  -     TSH, 3rd generation; Future  -     Calcium, ionized; Future  -     IgG, IgA, IgM; Future  -     Protein electrophoresis, serum; Future  -     PTH, intact; Future  -     PTH-related peptide; Future    Hypertension, essential  -     CBC and differential; Future  -     Comprehensive metabolic panel; Future  -     TSH, 3rd generation with Free T4 reflex; Future  -     UA (URINE) with reflex to Scope  -     T3, free; Future  -     T4, free; Future  -     Thyroid Antibodies Panel; Future  -     TSH, 3rd generation; Future  -     Calcium, ionized; Future  -     IgG, IgA, IgM; Future  -     Protein electrophoresis, serum; Future  -     PTH, intact; Future  -     PTH-related peptide; Future    Age-related osteoporosis without current pathological fracture  -     CBC and differential; Future  -     Comprehensive metabolic panel; Future  -     TSH, 3rd generation with Free T4 reflex; Future  -     UA (URINE) with reflex to Scope  -     T3, free; Future  -     T4, free; Future  -     Thyroid Antibodies Panel; Future  -     TSH, 3rd generation; Future  -     Calcium, ionized; Future  -     IgG, IgA, IgM; Future  -     Protein electrophoresis, serum; Future  -     PTH, intact; Future  -     PTH-related peptide; Future    Hypercalcemia  -     CBC and differential; Future  -     Comprehensive metabolic panel; Future  -     TSH, 3rd generation with Free T4 reflex; Future  -     UA (URINE) with reflex to Scope  -     T3, free; Future  -     T4, free; Future  -     Thyroid Antibodies Panel;  Future  - TSH, 3rd generation; Future  -     Calcium, ionized; Future  -     IgG, IgA, IgM; Future  -     Protein electrophoresis, serum; Future  -     PTH, intact; Future  -     PTH-related peptide; Future                Subjective:      Patient ID: Dominick Andrade is a 81 y o  female  Continue complaint of back pain since a fall in November  Cause of the fall was the patient had an uncontrolled watery bowel movement on the floor of the bathroom while prepping for a colonoscopy   She had compression fracture and has a continued pain since then  Actually hospitalized in January for the pain  At that time, a lot of imaging done and  CT scan of the abdomen and pelvis did not show any sign of any a malignancy  Continued complaint of pain in feeling poorly overall     Taking minimal medications at this time including atorvastatin atenolol 25 mg daily unchanged from prior  Also multivitamin       The colonoscopy was actually done even though she sustained the fall and there was a cecal polyp documented with next colonoscopy in 3 years so towards the end of 2023  the pain limits her activities and range of motion     She is basically stuck at home  Comorbidity of chronic kidney disease stage 3 renders use of nonsteroidal anti-inflammatory drug problematic  The following portions of the patient's history were reviewed and updated as appropriate:   She has a past medical history of Alopecia, Cardiac arrhythmia, Compression fracture of L1 lumbar vertebra ([address] ), and Occlusion of carotid artery  ,  does not have any pertinent problems on file  ,   has a past surgical history that includes Appendectomy; Thromboendarterectomy (Left); Colonoscopy; Esophagogastroduodenoscopy; Tonsillectomy; Back surgery; Breast biopsy (Right); and Shoulder surgery  ,  family history includes No Known Problems in her daughter, daughter, maternal grandmother, mother, paternal grandmother, and sister  ,   reports that she has never smoked  She has never used smokeless tobacco  She reports previous alcohol use  She reports that she does not use drugs  ,  is allergic to myrbetriq [mirabegron]; latex; barium sulfate; penicillins; and tramadol     Current Outpatient Medications   Medication Sig Dispense Refill    atenolol (TENORMIN) 25 mg tablet Take 1 tablet (25 mg total) by mouth daily 90 tablet 3    atorvastatin (LIPITOR) 40 mg tablet Take 1 tablet (40 mg total) by mouth daily 90 tablet 2    Calcium Carbonate-Vitamin D3 600-400 MG-UNIT TABS 1 tab daily      CRANBERRY SOFT PO Take by mouth      multivitamin (THERAGRAN) TABS Take 1 tablet by mouth       No current facility-administered medications for this visit  Review of Systems   Constitutional: Positive for fatigue and unexpected weight change  Musculoskeletal: Positive for arthralgias, back pain and gait problem  Objective:  Vitals:    03/03/21 0930   BP: 132/70   Pulse: 89   Temp: 98 1 °F (36 7 °C)   SpO2: 96%      Physical Exam  Constitutional:       Appearance: She is not ill-appearing  Comments: Female patient who appears to be stated age; visibly kyphotic and moving slowly   HENT:      Mouth/Throat:      Mouth: Mucous membranes are dry  Eyes:      General: No scleral icterus  Extraocular Movements: Extraocular movements intact  Neck:      Musculoskeletal: Neck rigidity present  Cardiovascular:      Pulses: Normal pulses  Pulmonary:      Effort: Pulmonary effort is normal    Abdominal:      General: Abdomen is flat  Musculoskeletal:      Comments:  Osteoarthrosis deformities  Kyphosis   Impaired gait and needs a quad cane for ambulatory support  Skin:     General: Skin is warm and dry  Neurological:      General: No focal deficit present  Patient Instructions     Hypertension is treated   Hyperlipidemia is treated; no problems related to these diagnoses  Tolerating medication   Chronic pain  She is taking Tylenol    Use of other medications is very problematic due to age, infirmity, and CKD  Choices are quite limited     History of a large colon polyp and continues to follow with GI     Will recheck laboratory parameters to ensure no metabolic derangement otherwise    Follow-up in August

## 2021-03-03 NOTE — PROGRESS NOTES
Assessment and Plan:     Problem List Items Addressed This Visit     None           Preventive health issues were discussed with patient, and age appropriate screening tests were ordered as noted in patient's After Visit Summary  Personalized health advice and appropriate referrals for health education or preventive services given if needed, as noted in patient's After Visit Summary       History of Present Illness:     Patient presents for Medicare Annual Wellness visit    Patient Care Team:  Ching Jacinto MD as PCP - General  Heidi Sweetser-Cohen, PA-C Claudette Shade, MD Athena Manning, MD (Gastroenterology)     Problem List:     Patient Active Problem List   Diagnosis    Arthritis    Hypertension, essential    Combined hyperlipidemia    Pre-operative cardiovascular examination    Microscopic hematuria    Cardiac arrhythmia    Anemia    Urinary retention    Bladder diverticulum    Bladder prolapse, female, acquired    Slow transit constipation    Age-related osteoporosis without current pathological fracture    Health care maintenance    Weight loss    Breast screening    Postural kyphosis of thoracolumbar region    Chest pressure    Compression fracture of T12 vertebra with delayed healing      Past Medical and Surgical History:     Past Medical History:   Diagnosis Date    Alopecia     Cardiac arrhythmia     Compression fracture of L1 lumbar vertebra ([address] )     resolved 09/2014    Occlusion of carotid artery     unspecified laterality resolved 08/05/2016     Past Surgical History:   Procedure Laterality Date    APPENDECTOMY      BACK SURGERY      BREAST BIOPSY Right     pt doesn't recall the year    COLONOSCOPY      resolved 2009    ESOPHAGOGASTRODUODENOSCOPY      mild gastritis resolved 2009    SHOULDER SURGERY      THROMBOENDARTERECTOMY Left     carotid, resloved 12/2012    TONSILLECTOMY        Family History:     Family History   Problem Relation Age of Onset    No Known Problems Mother     No Known Problems Sister     No Known Problems Daughter     No Known Problems Maternal Grandmother     No Known Problems Paternal Grandmother     No Known Problems Daughter       Social History:     E-Cigarette/Vaping    E-Cigarette Use Never User      E-Cigarette/Vaping Substances    Nicotine No     THC No     CBD No     Flavoring No     Other No     Unknown No      Social History     Socioeconomic History    Marital status: /Civil Union     Spouse name: Not on file    Number of children: Not on file    Years of education: Not on file    Highest education level: Not on file   Occupational History    Not on file   Social Needs    Financial resource strain: Not on file    Food insecurity     Worry: Not on file     Inability: Not on file   [employer] needs     Medical: Not on file     Non-medical: Not on file   Tobacco Use    Smoking status: Never Smoker    Smokeless tobacco: Never Used   Substance and Sexual Activity    Alcohol use: Not Currently     Comment: rarely (history); socially    Drug use: No    Sexual activity: Not Currently     Partners: Male   Lifestyle    Physical activity     Days per week: 0 days     Minutes per session: 0 min    Stress: Not at all   Relationships    Social connections     Talks on phone: Not on file     Gets together: Not on file     Attends Evangelical service: Not on file     Active member of club or organization: Not on file     Attends meetings of clubs or organizations: Not on file     Relationship status: Not on file    Intimate partner violence     Fear of current or ex partner: Not on file     Emotionally abused: Not on file     Physically abused: Not on file     Forced sexual activity: Not on file   Other Topics Concern    Not on file   Social History Narrative    Not on file      Medications and Allergies:     Current Outpatient Medications   Medication Sig Dispense Refill    alendronate (FOSAMAX) 35 mg tablet Take 1 tablet (35 mg total) by mouth every 7 days 12 tablet 3    atenolol (TENORMIN) 25 mg tablet Take 1 tablet (25 mg total) by mouth daily 90 tablet 3    atorvastatin (LIPITOR) 40 mg tablet Take 1 tablet (40 mg total) by mouth daily 90 tablet 2    bisacodyl (FLEET) 10 MG/30ML ENEM Insert 30 mL (10 mg total) into the rectum once as needed for constipation for up to 1 dose 30 mL 0    Calcium Carbonate-Vitamin D3 600-400 MG-UNIT TABS 1 tab daily      CRANBERRY SOFT PO Take by mouth      Enulose 10 GM/15ML TAKE 30 ML BY MOUTH DAILY AS NEEDED 2838 mL 1    estradiol (ESTRACE VAGINAL) 0 1 mg/g vaginal cream       Glycerin, Laxative, 2 8 g SUPP Insert 1 suppository (2 8 g total) into the rectum 2 (two) times a day 14 suppository 0    lubiprostone (AMITIZA) 24 mcg capsule Take 1 capsule (24 mcg total) by mouth 2 (two) times a day with meals 180 capsule 3    multivitamin (THERAGRAN) TABS Take 1 tablet by mouth      nystatin-triamcinolone (MYCOLOG-II) ointment       Omega-3 Fatty Acids (FISH OIL) 500 MG CAPS Take 1 capsule by mouth daily      ondansetron (ZOFRAN-ODT) 4 mg disintegrating tablet Take 1 tablet (4 mg total) by mouth every 6 (six) hours as needed for nausea or vomiting 20 tablet 0     No current facility-administered medications for this visit        Allergies   Allergen Reactions    Myrbetriq [Mirabegron] GI Intolerance and Hives    Latex Hives and Swelling    Barium Sulfate Diarrhea    Penicillins     Tramadol Vomiting     Dizziness as well      Immunizations:     Immunization History   Administered Date(s) Administered    Td (adult), adsorbed 1935      Health Maintenance:         Topic Date Due    DXA SCAN  08/26/2021    Colonoscopy Surveillance  12/01/2023         Topic Date Due    DTaP,Tdap,and Td Vaccines (1 - Tdap) 08/[birth year]/1957    Pneumococcal Vaccine: 65+ Years (1 of 1 - PPSV23) 08/24/2001    Influenza Vaccine (1) 09/01/2020      Medicare Health Risk Assessment:     There were no vitals taken for this visit  David Romero is here for her Subsequent Wellness visit  Last Medicare Wellness visit information reviewed, patient interviewed and updates made to the record today  Depression Screening:   PHQ-2 Score: 0      Fall Risk Screening: In the past year, patient has experienced: history of falling in past year    Injured during fall?: Yes      Urinary Incontinence Screening:   Patient has leaked urine accidently in the last six months  Home Safety:  Patient has trouble with stairs inside or outside of their home  Home safety hazards include: none       PREVENTIVE SCREENINGS      Cardiovascular Screening:    General: Screening Not Indicated and History Lipid Disorder      Diabetes Screening:     General: Screening Current      Breast Cancer Screening:     General: Screening Current      Cervical Cancer Screening:    General: Screening Not Indicated      Osteoporosis Screening:    General: Screening Not Indicated and History Osteoporosis      Lung Cancer Screening:     General: Screening Not Indicated      Ching Jacinto MD

## 2021-03-04 ENCOUNTER — TELEPHONE (OUTPATIENT)
Dept: INTERNAL MEDICINE CLINIC | Facility: CLINIC | Age: 86
End: 2021-03-04

## 2021-03-04 ENCOUNTER — APPOINTMENT (OUTPATIENT)
Dept: LAB | Facility: CLINIC | Age: 86
End: 2021-03-04
Payer: MEDICARE

## 2021-03-04 ENCOUNTER — HOSPITAL ENCOUNTER (OUTPATIENT)
Dept: MRI IMAGING | Facility: HOSPITAL | Age: 86
Discharge: HOME | End: 2021-03-04
Attending: ORTHOPAEDIC SURGERY
Payer: MEDICARE

## 2021-03-04 DIAGNOSIS — M51.36 DDD (DEGENERATIVE DISC DISEASE), LUMBAR: ICD-10-CM

## 2021-03-04 DIAGNOSIS — E21.3 HYPERPARATHYROIDISM (HCC): ICD-10-CM

## 2021-03-04 DIAGNOSIS — E21.0 HYPERPARATHYROID BONE DISEASE (HCC): Primary | ICD-10-CM

## 2021-03-04 DIAGNOSIS — S22.080A COMPRESSION FRACTURE OF T12 VERTEBRA, INITIAL ENCOUNTER (HCC): ICD-10-CM

## 2021-03-04 LAB
ALBUMIN SERPL ELPH-MCNC: 3.59 G/DL (ref 3.5–5)
ALBUMIN SERPL ELPH-MCNC: 53.6 % (ref 52–65)
ALPHA1 GLOB SERPL ELPH-MCNC: 0.52 G/DL (ref 0.1–0.4)
ALPHA1 GLOB SERPL ELPH-MCNC: 7.7 % (ref 2.5–5)
ALPHA2 GLOB SERPL ELPH-MCNC: 1.21 G/DL (ref 0.4–1.2)
ALPHA2 GLOB SERPL ELPH-MCNC: 18.1 % (ref 7–13)
BACTERIA UR QL AUTO: ABNORMAL /HPF
BETA GLOB ABNORMAL SERPL ELPH-MCNC: 0.43 G/DL (ref 0.4–0.8)
BETA1 GLOB SERPL ELPH-MCNC: 6.4 % (ref 5–13)
BETA2 GLOB SERPL ELPH-MCNC: 5.7 % (ref 2–8)
BETA2+GAMMA GLOB SERPL ELPH-MCNC: 0.38 G/DL (ref 0.2–0.5)
BILIRUB UR QL STRIP: NEGATIVE
CLARITY UR: ABNORMAL
COLOR UR: YELLOW
GAMMA GLOB ABNORMAL SERPL ELPH-MCNC: 0.57 G/DL (ref 0.5–1.6)
GAMMA GLOB SERPL ELPH-MCNC: 8.5 % (ref 12–22)
GLUCOSE UR STRIP-MCNC: NEGATIVE MG/DL
HGB UR QL STRIP.AUTO: NEGATIVE
IGG/ALB SER: 1.16 {RATIO} (ref 1.1–1.8)
KETONES UR STRIP-MCNC: NEGATIVE MG/DL
LEUKOCYTE ESTERASE UR QL STRIP: ABNORMAL
MUCOUS THREADS UR QL AUTO: ABNORMAL
NITRITE UR QL STRIP: NEGATIVE
NON-SQ EPI CELLS URNS QL MICRO: ABNORMAL /HPF
PH UR STRIP.AUTO: 6.5 [PH]
PROT PATTERN SERPL ELPH-IMP: ABNORMAL
PROT SERPL-MCNC: 6.7 G/DL (ref 6.4–8.2)
PROT UR STRIP-MCNC: ABNORMAL MG/DL
RBC #/AREA URNS AUTO: ABNORMAL /HPF
SP GR UR STRIP.AUTO: 1.02 (ref 1–1.03)
THYROGLOB AB SERPL-ACNC: <1 IU/ML (ref 0–0.9)
TRI-PHOS CRY URNS QL MICRO: ABNORMAL /HPF
UROBILINOGEN UR QL STRIP.AUTO: 0.2 E.U./DL
WBC #/AREA URNS AUTO: ABNORMAL /HPF

## 2021-03-04 PROCEDURE — G1004 CDSM NDSC: HCPCS

## 2021-03-04 PROCEDURE — 81001 URINALYSIS AUTO W/SCOPE: CPT | Performed by: INTERNAL MEDICINE

## 2021-03-04 PROCEDURE — 72148 MRI LUMBAR SPINE W/O DYE: CPT

## 2021-03-04 NOTE — TELEPHONE ENCOUNTER
A surgical consult is premature  Have her get the scan done then I will have her come in to go over the result

## 2021-03-04 NOTE — TELEPHONE ENCOUNTER
Notified patient of results, she became rude and demanding about a name of a surgeon, I told her no doctor is noted on the referral  She is requesting Dr Lisbeth Breaux to give her a call about results

## 2021-03-04 NOTE — TELEPHONE ENCOUNTER
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

## 2021-03-04 NOTE — TELEPHONE ENCOUNTER
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

## 2021-03-04 NOTE — TELEPHONE ENCOUNTER
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

## 2021-03-04 NOTE — TELEPHONE ENCOUNTER
CALLED PT   AND WAS NOTIFIED AND IS WONDERING WHAT SHE CAN CHANGE IN HER DIET TO CONTROL OR LOWER THE CALCIUM

## 2021-03-04 NOTE — TELEPHONE ENCOUNTER
pt is upset and hung up the phone on me  She wants a phone call from dr Roxy Vergara or a sooner appt than 3/11/2021(the next available appt slot) to discuss labs results  I told her exactly what dr Roxy Vergara said, she is insisting on speaking to him as soon as possible

## 2021-03-05 ENCOUNTER — TELEPHONE (OUTPATIENT)
Dept: OBGYN CLINIC | Facility: HOSPITAL | Age: 86
End: 2021-03-05

## 2021-03-05 DIAGNOSIS — R82.81 PYURIA: Primary | ICD-10-CM

## 2021-03-05 DIAGNOSIS — R31.29 MICROSCOPIC HEMATURIA: ICD-10-CM

## 2021-03-05 LAB — THYROPEROXIDASE AB SERPL-ACNC: <9 IU/ML (ref 0–34)

## 2021-03-05 NOTE — TELEPHONE ENCOUNTER
Patient sees Dr Marylen Snell    Patient is going to be in for a thyroid examination and patient stated she was advised she'd have to wait for 2 hours  Patient wanted to know where she would have to be waiting, whether in the hospital or in the waiting room      Call back # [phone]

## 2021-03-06 ENCOUNTER — LAB (OUTPATIENT)
Dept: LAB | Facility: CLINIC | Age: 86
End: 2021-03-06
Payer: MEDICARE

## 2021-03-06 PROCEDURE — 87186 SC STD MICRODIL/AGAR DIL: CPT

## 2021-03-06 PROCEDURE — 87086 URINE CULTURE/COLONY COUNT: CPT

## 2021-03-08 NOTE — TELEPHONE ENCOUNTER
Unsure what this is in reference to, but upon chart review thyroid scan was ordered by a different provider  Patient would have to contact that department  Thanks

## 2021-03-10 ENCOUNTER — OFFICE VISIT (OUTPATIENT)
Dept: OBGYN CLINIC | Facility: CLINIC | Age: 86
End: 2021-03-10
Payer: MEDICARE

## 2021-03-10 VITALS
SYSTOLIC BLOOD PRESSURE: 159 MMHG | HEART RATE: 72 BPM | HEIGHT: 67 IN | DIASTOLIC BLOOD PRESSURE: 79 MMHG | BODY MASS INDEX: 20.36 KG/M2

## 2021-03-10 DIAGNOSIS — M51.36 DDD (DEGENERATIVE DISC DISEASE), LUMBAR: Primary | ICD-10-CM

## 2021-03-10 DIAGNOSIS — S22.080A COMPRESSION FRACTURE OF T12 VERTEBRA, INITIAL ENCOUNTER (HCC): ICD-10-CM

## 2021-03-10 PROCEDURE — 99213 OFFICE O/P EST LOW 20 MIN: CPT | Performed by: ORTHOPAEDIC SURGERY

## 2021-03-10 NOTE — PROGRESS NOTES
Assessment:   Diagnosis ICD-10-CM Associated Orders   1  DDD (degenerative disc disease), lumbar  M51 36 Ambulatory referral to Pain Management   2  Compression fracture of T12 vertebra, initial encounter ([facility] [address] )  [id number]        Plan:    MRI reviewed with patient acute T12 compressive fracture with posterior with buckling retropulsion w/o significant canal stenosis  Patient is non tender over fracture site  She is tender right L5 region which is related to lumbar ddd  No significant canal or foraminal stenosis in lumbar spine  Neurologically stable  She is to continue with LSO brace and walker until seen again in 6 weeks  Continue to avoid lifting, bending twisting motions  She will see Dr Jaelyn Westbrook for facet blocks at L5/S1 then epidural at L5/S1  See back in 6 weeks to see if pain resolved  Discussed she doesn't need kyphoplasty at T12 at this time  To do next visit:  Return in about 6 weeks (around 4/21/2021) for Recheck  The above stated was discussed in layman's terms and the patient expressed understanding  All questions were answered to the patient's satisfaction  Scribe Attestation    I,:  Madison Albert am acting as a scribe while in the presence of the attending physician :       I,:  Parveen Prater MD personally performed the services described in this documentation    as scribed in my presence :             Subjective: Christy Barrera is a 81 y o  female who presents for f/u regarding her T12 compression fracture  She is here to review MRI  Fall 12/4/20  Also has chronic compression fractures at L1 and L5  She presents in LSO brace and using walker to ambulate  She is having no pain around fracture, pain is lower at right L5 region midline and across  Denies numbness or tingling in legs, no weakness  She has also had SI injection left  by Dr Jaelyn Westbrook 1/27/21  History lumbar fusion L3-5 by Dr Wade Jack   2 months after surgery she did break some rods which Dr Wade Jack discussed with her but since she really wasn't having pain no further surgery was needed  Review of systems negative unless otherwise specified in HPI  Review of Systems   Constitutional: Negative for chills and fever  HENT: Negative for ear pain and sore throat  Eyes: Negative for pain and visual disturbance  Respiratory: Negative for cough and shortness of breath  Cardiovascular: Negative for chest pain and palpitations  Gastrointestinal: Negative for abdominal pain and vomiting  Genitourinary: Negative for dysuria and hematuria  Musculoskeletal: Negative for arthralgias and back pain  Skin: Negative for color change and rash  Neurological: Negative for seizures and syncope  All other systems reviewed and are negative        Past Medical History:   Diagnosis Date    Alopecia     Cardiac arrhythmia     Compression fracture of L1 lumbar vertebra ([address] )     resolved 09/2014    Occlusion of carotid artery     unspecified laterality resolved 08/05/2016       Past Surgical History:   Procedure Laterality Date    APPENDECTOMY      BACK SURGERY      BREAST BIOPSY Right     pt doesn't recall the year    COLONOSCOPY      resolved 2009    ESOPHAGOGASTRODUODENOSCOPY      mild gastritis resolved 2009    SHOULDER SURGERY      THROMBOENDARTERECTOMY Left     carotid, resloved 12/2012    TONSILLECTOMY         Family History   Problem Relation Age of Onset    No Known Problems Mother     No Known Problems Sister     No Known Problems Daughter     No Known Problems Maternal Grandmother     No Known Problems Paternal Grandmother     No Known Problems Daughter        Social History     Occupational History    Not on file   Tobacco Use    Smoking status: Never Smoker    Smokeless tobacco: Never Used   Substance and Sexual Activity    Alcohol use: Not Currently     Comment: rarely (history); socially    Drug use: No    Sexual activity: Not Currently     Partners: Male         Current Outpatient Medications:     atenolol (TENORMIN) 25 mg tablet, Take 1 tablet (25 mg total) by mouth daily, Disp: 90 tablet, Rfl: 3    atorvastatin (LIPITOR) 40 mg tablet, Take 1 tablet (40 mg total) by mouth daily, Disp: 90 tablet, Rfl: 2    Calcium Carbonate-Vitamin D3 600-400 MG-UNIT TABS, 1 tab daily, Disp: , Rfl:     CRANBERRY SOFT PO, Take by mouth, Disp: , Rfl:     multivitamin (THERAGRAN) TABS, Take 1 tablet by mouth, Disp: , Rfl:     Allergies   Allergen Reactions    Myrbetriq [Mirabegron] GI Intolerance and Hives    Latex Hives and Swelling    Barium Sulfate Diarrhea    Penicillins     Tramadol Vomiting     Dizziness as well            Vitals:    03/10/21 0943   BP: 159/79   Pulse: 72       Objective:                    Back Exam     Comments:  Lumbar spine  No acute distress, presents with LSO brace and walker  TTP right midline L5 and across right musculature   L2-S1 5/5 strength bilateral   L2-S1 sensation intact and symmetric             Diagnostics, reviewed and taken today if performed as documented: The attending physician has personally reviewed the pertinent films in PACS and interpretation is as follows: MRI w/wo contrast lumbar spine severe T12 wedge compression fracture with mild posterior buckling retropulsion w/o significant canal stenosis, chronic L1 compression deformity, post surgical L3-5 decompression laminectomies and fusion, mild canal narrowing at L2/3 w/o significant canal or foraminal stenosis  Procedures, if performed today:    Procedures    None performed      Portions of the record may have been created with voice recognition software  Occasional wrong word or "sound a like" substitutions may have occurred due to the inherent limitations of voice recognition software  Read the chart carefully and recognize, using context, where substitutions have occurred

## 2021-03-11 ENCOUNTER — TELEPHONE (OUTPATIENT)
Dept: PAIN MEDICINE | Facility: CLINIC | Age: 86
End: 2021-03-11

## 2021-03-13 LAB — PTH RELATED PROT SERPL-SCNC: <2 PMOL/L

## 2021-03-16 ENCOUNTER — OFFICE VISIT (OUTPATIENT)
Dept: PAIN MEDICINE | Facility: CLINIC | Age: 86
End: 2021-03-16
Payer: MEDICARE

## 2021-03-16 ENCOUNTER — APPOINTMENT (OUTPATIENT)
Dept: RADIOLOGY | Facility: CLINIC | Age: 86
End: 2021-03-16
Payer: MEDICARE

## 2021-03-16 VITALS
HEART RATE: 80 BPM | BODY MASS INDEX: 21.16 KG/M2 | RESPIRATION RATE: 16 BRPM | HEIGHT: 67 IN | SYSTOLIC BLOOD PRESSURE: 150 MMHG | DIASTOLIC BLOOD PRESSURE: 75 MMHG | WEIGHT: 134.8 LBS

## 2021-03-16 DIAGNOSIS — M96.1 LUMBAR POST-LAMINECTOMY SYNDROME: ICD-10-CM

## 2021-03-16 DIAGNOSIS — M54.50 CHRONIC MIDLINE LOW BACK PAIN WITHOUT SCIATICA: ICD-10-CM

## 2021-03-16 DIAGNOSIS — G89.4 CHRONIC PAIN SYNDROME: Primary | ICD-10-CM

## 2021-03-16 DIAGNOSIS — G89.4 CHRONIC PAIN SYNDROME: ICD-10-CM

## 2021-03-16 DIAGNOSIS — G89.29 CHRONIC MIDLINE LOW BACK PAIN WITHOUT SCIATICA: ICD-10-CM

## 2021-03-16 PROCEDURE — 72114 X-RAY EXAM L-S SPINE BENDING: CPT

## 2021-03-16 PROCEDURE — 99214 OFFICE O/P EST MOD 30 MIN: CPT | Performed by: ANESTHESIOLOGY

## 2021-03-16 NOTE — PROGRESS NOTES
Pain Medicine Follow-Up Note    Assessment:  1  Chronic pain syndrome    2  Chronic midline low back pain without sciatica    3  Lumbar post-laminectomy syndrome        Plan:  Orders Placed This Encounter   Procedures    XR spine lumbar complete w bending minimum 6 views     Standing Status:   Future     Number of Occurrences:   1     Standing Expiration Date:   3/16/2025     Scheduling Instructions:      Bring along any outside films relating to this procedure  My impressions and treatment recommendations were discussed in detail with the patient who verbalized understanding and had no further questions  The patient reported that she saw Dr aSrah Parks who suggested that she get facet medial branch blocks or an epidural steroid injection  Patient does have hardware in this region, so I felt a reasonable to have her undergo a x-ray of the lumbar spine to see if she can undergo any of these treatment modalities  She does have signs and symptoms consistent with lumbar facet syndrome  If I am able to proceed after the patient's x-ray, I will schedule her for medial branch blocks  If I cannot proceed, she may need to move forward with a caudal epidural steroid injection  I will discuss this further with her once I obtain the x-ray of the lumbar spine  Discharge instructions were provided  I personally saw and examined the patient and I agree with the above discussed plan of care  History of Present Illness: Edvin Reed is a 81 y o  female who presents to [facility] for interval re-evaluation of the above stated pain complaints  The patient has a past medical and chronic pain history as outlined in the assessment section  She was last seen on   January 27, 2021 at which time she underwent a left sacroiliac joint injection  at today's office visit, the patient's pain score is 9/10 on the verbal numerical pain rating scale    The patient states that her pain is primarily in the low back  She describes her pain as worse in the morning, evening, and night  Her pain is constant in nature  She reports the quality of her pain as sharp, throbbing  She states that her symptoms are primarily in her low back and do not radiate elsewhere  She states that she is following up after seeing Dr Thais Ho who suggested that she undergo further interventional pain procedures  Other than as stated above, the patient denies any interval changes in medications, medical condition, mental condition, symptoms, or allergies since the last office visit  Review of Systems:    Review of Systems   Respiratory: Negative for shortness of breath  Cardiovascular: Negative for chest pain  Gastrointestinal: Negative for constipation, diarrhea, nausea and vomiting  Musculoskeletal: Positive for gait problem  Negative for arthralgias, joint swelling and myalgias  Decreased range of motion and joint stiffness   Skin: Negative for rash  Neurological: Negative for dizziness, seizures and weakness  All other systems reviewed and are negative          Patient Active Problem List   Diagnosis    Arthritis    Hypertension, essential    Combined hyperlipidemia    Pre-operative cardiovascular examination    Microscopic hematuria    Cardiac arrhythmia    Anemia    Urinary retention    Bladder diverticulum    Bladder prolapse, female, acquired    Slow transit constipation    Age-related osteoporosis without current pathological fracture    Health care maintenance    Weight loss    Breast screening    Postural kyphosis of thoracolumbar region    Chest pressure    Compression fracture of T12 vertebra with delayed healing    Chronic fatigue    Hypercalcemia       Past Medical History:   Diagnosis Date    Alopecia     Cardiac arrhythmia     Compression fracture of L1 lumbar vertebra ([facility])     resolved 09/2014    Occlusion of carotid artery     unspecified laterality resolved 08/05/2016       Past Surgical History:   Procedure Laterality Date    APPENDECTOMY      BACK SURGERY      BREAST BIOPSY Right     pt doesn't recall the year    COLONOSCOPY      resolved 2009    ESOPHAGOGASTRODUODENOSCOPY      mild gastritis resolved 2009    SHOULDER SURGERY      THROMBOENDARTERECTOMY Left     carotid, resloved 12/2012    TONSILLECTOMY         Family History   Problem Relation Age of Onset    No Known Problems Mother     No Known Problems Sister     No Known Problems Daughter     No Known Problems Maternal Grandmother     No Known Problems Paternal Grandmother     No Known Problems Daughter        Social History     Occupational History    Not on file   Tobacco Use    Smoking status: Never Smoker    Smokeless tobacco: Never Used   Substance and Sexual Activity    Alcohol use: Not Currently     Comment: rarely (history); socially    Drug use: No    Sexual activity: Not Currently     Partners: Male         Current Outpatient Medications:     atenolol (TENORMIN) 25 mg tablet, Take 1 tablet (25 mg total) by mouth daily, Disp: 90 tablet, Rfl: 3    atorvastatin (LIPITOR) 40 mg tablet, Take 1 tablet (40 mg total) by mouth daily, Disp: 90 tablet, Rfl: 2    Calcium Carbonate-Vitamin D3 600-400 MG-UNIT TABS, 1 tab daily, Disp: , Rfl:     CRANBERRY SOFT PO, Take by mouth, Disp: , Rfl:     multivitamin (THERAGRAN) TABS, Take 1 tablet by mouth, Disp: , Rfl:     Allergies   Allergen Reactions    Myrbetriq [Mirabegron] GI Intolerance and Hives    Latex Hives and Swelling    Barium Sulfate Diarrhea    Penicillins     Tramadol Vomiting     Dizziness as well       Physical Exam:    /75   Pulse 80   Resp 16   Ht 5' 7" (1 702 m)   Wt 61 1 kg (134 lb 12 8 oz)   BMI 21 11 kg/m²     Constitutional:normal, well developed, well nourished, alert, in no distress and non-toxic and no overt pain behavior    Eyes:anicteric  HEENT:grossly intact  Neck:supple, symmetric, trachea midline and no masses   Pulmonary:even and unlabored  Cardiovascular:No edema or pitting edema present  Skin:Normal without rashes or lesions and well hydrated  Psychiatric:Mood and affect appropriate  Neurologic:Cranial Nerves II-XII grossly intact  Musculoskeletal:antalgic and ambulates with a walker  Lumbar facet loading is positive bilaterally  There is mild tenderness over the sacroiliac joints  Imaging    MRI LUMBAR SPINE WITHOUT CONTRAST     INDICATION: Evaluate T12 compression fracture      COMPARISON:  None      TECHNIQUE:  Sagittal T1, sagittal T2, sagittal inversion recovery, axial T1 and axial T2, coronal T2     IMAGE QUALITY:  Diagnostic     FINDINGS:     VERTEBRAL BODIES:  There are 5 lumbar type vertebral bodies      Postsurgical changes status post L3-L5 decompressive laminectomies and instrumented fusion      There is a severe T12 wedge compression fracture with 80% height loss  There is mild posterior buckling/retropulsion without significant canal stenosis  There is mild bone marrow edema      There is chronic moderate compression fracture of L1 inferior endplate with Schmorl's node formation  There is approximately 50% height loss anteriorly      Grade 1 anterolisthesis of L4 on L5  Mild retrolisthesis of L1 on L2  Modic type II degenerative edema at L2 inferior endplate         SACRUM:  Normal signal within the sacrum  No evidence of insufficiency or stress fracture      DISTAL CORD AND CONUS:  Normal size and signal within the distal cord and conus      PARASPINAL SOFT TISSUES:  Postsurgical changes in the paraspinal soft tissue levels L3-L5      Indeterminate tiny T2 hyperintense focus within the visualized liver statistically favor to represent hepatic cyst      LOWER THORACIC DISC SPACES:  Mild noncompressive lower thoracic degenerative change      LUMBAR DISC SPACES:     L1-L2:  There is mild disc bulge and facet arthropathy  There is mild canal narrowing    Mild left foraminal stenosis      L2-L3:  There is disc bulge and bilateral facet arthropathy resulting in mild lateral recesses and canal narrowing  Right greater than left mild bilateral foraminal narrowing      L3-L4:  Decompressive laminectomies  Minimal disc bulge and small endplate osteophytes  Canal is widely patent  Mild right foraminal narrowing      L4-L5:  Grade 1 anterolisthesis uncovering posterior disc margin  Decompressive laminectomies  Canal is widely patent  Mild bilateral foraminal narrowing      L5-S1:  No significant disc bulge  Right greater than left facet arthropathy  There is no canal stenosis  No significant foraminal narrowing      IMPRESSION:     1  Recent severe T12 wedge compression fracture with mild posterior buckling/retropulsion without significant canal stenosis      2  Chronic moderate L1 vertebral body compression deformity      3  Postsurgical changes status post L3-L5 decompressive laminectomies and instrumented fusion      4  Degenerative changes without high-grade canal or foraminal stenosis as detailed  Mild canal narrowing at L2-3    No orders to display         Orders Placed This Encounter   Procedures    XR spine lumbar complete w bending minimum 6 views

## 2021-03-16 NOTE — H&P (VIEW-ONLY)
Pain Medicine Follow-Up Note    Assessment:  1  Chronic pain syndrome    2  Chronic midline low back pain without sciatica    3  Lumbar post-laminectomy syndrome        Plan:  Orders Placed This Encounter   Procedures    XR spine lumbar complete w bending minimum 6 views     Standing Status:   Future     Number of Occurrences:   1     Standing Expiration Date:   3/16/2025     Scheduling Instructions:      Bring along any outside films relating to this procedure  My impressions and treatment recommendations were discussed in detail with the patient who verbalized understanding and had no further questions  The patient reported that she saw Dr Giovanna Ford who suggested that she get facet medial branch blocks or an epidural steroid injection  Patient does have hardware in this region, so I felt a reasonable to have her undergo a x-ray of the lumbar spine to see if she can undergo any of these treatment modalities  She does have signs and symptoms consistent with lumbar facet syndrome  If I am able to proceed after the patient's x-ray, I will schedule her for medial branch blocks  If I cannot proceed, she may need to move forward with a caudal epidural steroid injection  I will discuss this further with her once I obtain the x-ray of the lumbar spine  Discharge instructions were provided  I personally saw and examined the patient and I agree with the above discussed plan of care  History of Present Illness: Joceline Cerna is a 81 y o  female who presents to [facility] for interval re-evaluation of the above stated pain complaints  The patient has a past medical and chronic pain history as outlined in the assessment section  She was last seen on   January 27, 2021 at which time she underwent a left sacroiliac joint injection  at today's office visit, the patient's pain score is 9/10 on the verbal numerical pain rating scale    The patient states that her pain is primarily in the low back  She describes her pain as worse in the morning, evening, and night  Her pain is constant in nature  She reports the quality of her pain as sharp, throbbing  She states that her symptoms are primarily in her low back and do not radiate elsewhere  She states that she is following up after seeing Dr Aldo Farris who suggested that she undergo further interventional pain procedures  Other than as stated above, the patient denies any interval changes in medications, medical condition, mental condition, symptoms, or allergies since the last office visit  Review of Systems:    Review of Systems   Respiratory: Negative for shortness of breath  Cardiovascular: Negative for chest pain  Gastrointestinal: Negative for constipation, diarrhea, nausea and vomiting  Musculoskeletal: Positive for gait problem  Negative for arthralgias, joint swelling and myalgias  Decreased range of motion and joint stiffness   Skin: Negative for rash  Neurological: Negative for dizziness, seizures and weakness  All other systems reviewed and are negative          Patient Active Problem List   Diagnosis    Arthritis    Hypertension, essential    Combined hyperlipidemia    Pre-operative cardiovascular examination    Microscopic hematuria    Cardiac arrhythmia    Anemia    Urinary retention    Bladder diverticulum    Bladder prolapse, female, acquired    Slow transit constipation    Age-related osteoporosis without current pathological fracture    Health care maintenance    Weight loss    Breast screening    Postural kyphosis of thoracolumbar region    Chest pressure    Compression fracture of T12 vertebra with delayed healing    Chronic fatigue    Hypercalcemia       Past Medical History:   Diagnosis Date    Alopecia     Cardiac arrhythmia     Compression fracture of L1 lumbar vertebra (HCC)     resolved 09/2014    Occlusion of carotid artery     unspecified laterality resolved 08/05/2016       Past Surgical History:   Procedure Laterality Date    APPENDECTOMY      BACK SURGERY      BREAST BIOPSY Right     pt doesn't recall the year    COLONOSCOPY      resolved 2009    ESOPHAGOGASTRODUODENOSCOPY      mild gastritis resolved 2009    SHOULDER SURGERY      THROMBOENDARTERECTOMY Left     carotid, resloved 12/2012    TONSILLECTOMY         Family History   Problem Relation Age of Onset    No Known Problems Mother     No Known Problems Sister     No Known Problems Daughter     No Known Problems Maternal Grandmother     No Known Problems Paternal Grandmother     No Known Problems Daughter        Social History     Occupational History    Not on file   Tobacco Use    Smoking status: Never Smoker    Smokeless tobacco: Never Used   Substance and Sexual Activity    Alcohol use: Not Currently     Comment: rarely (history); socially    Drug use: No    Sexual activity: Not Currently     Partners: Male         Current Outpatient Medications:     atenolol (TENORMIN) 25 mg tablet, Take 1 tablet (25 mg total) by mouth daily, Disp: 90 tablet, Rfl: 3    atorvastatin (LIPITOR) 40 mg tablet, Take 1 tablet (40 mg total) by mouth daily, Disp: 90 tablet, Rfl: 2    Calcium Carbonate-Vitamin D3 600-400 MG-UNIT TABS, 1 tab daily, Disp: , Rfl:     CRANBERRY SOFT PO, Take by mouth, Disp: , Rfl:     multivitamin (THERAGRAN) TABS, Take 1 tablet by mouth, Disp: , Rfl:     Allergies   Allergen Reactions    Myrbetriq [Mirabegron] GI Intolerance and Hives    Latex Hives and Swelling    Barium Sulfate Diarrhea    Penicillins     Tramadol Vomiting     Dizziness as well       Physical Exam:    /75   Pulse 80   Resp 16   Ht 5' 7" (1 702 m)   Wt 61 1 kg (134 lb 12 8 oz)   BMI 21 11 kg/m²     Constitutional:normal, well developed, well nourished, alert, in no distress and non-toxic and no overt pain behavior    Eyes:anicteric  HEENT:grossly intact  Neck:supple, symmetric, trachea midline and no masses   Pulmonary:even and unlabored  Cardiovascular:No edema or pitting edema present  Skin:Normal without rashes or lesions and well hydrated  Psychiatric:Mood and affect appropriate  Neurologic:Cranial Nerves II-XII grossly intact  Musculoskeletal:antalgic and ambulates with a walker  Lumbar facet loading is positive bilaterally  There is mild tenderness over the sacroiliac joints  Imaging    MRI LUMBAR SPINE WITHOUT CONTRAST     INDICATION: Evaluate T12 compression fracture      COMPARISON:  None      TECHNIQUE:  Sagittal T1, sagittal T2, sagittal inversion recovery, axial T1 and axial T2, coronal T2     IMAGE QUALITY:  Diagnostic     FINDINGS:     VERTEBRAL BODIES:  There are 5 lumbar type vertebral bodies      Postsurgical changes status post L3-L5 decompressive laminectomies and instrumented fusion      There is a severe T12 wedge compression fracture with 80% height loss  There is mild posterior buckling/retropulsion without significant canal stenosis  There is mild bone marrow edema      There is chronic moderate compression fracture of L1 inferior endplate with Schmorl's node formation  There is approximately 50% height loss anteriorly      Grade 1 anterolisthesis of L4 on L5  Mild retrolisthesis of L1 on L2  Modic type II degenerative edema at L2 inferior endplate         SACRUM:  Normal signal within the sacrum  No evidence of insufficiency or stress fracture      DISTAL CORD AND CONUS:  Normal size and signal within the distal cord and conus      PARASPINAL SOFT TISSUES:  Postsurgical changes in the paraspinal soft tissue levels L3-L5      Indeterminate tiny T2 hyperintense focus within the visualized liver statistically favor to represent hepatic cyst      LOWER THORACIC DISC SPACES:  Mild noncompressive lower thoracic degenerative change      LUMBAR DISC SPACES:     L1-L2:  There is mild disc bulge and facet arthropathy  There is mild canal narrowing    Mild left foraminal stenosis      L2-L3:  There is disc bulge and bilateral facet arthropathy resulting in mild lateral recesses and canal narrowing  Right greater than left mild bilateral foraminal narrowing      L3-L4:  Decompressive laminectomies  Minimal disc bulge and small endplate osteophytes  Canal is widely patent  Mild right foraminal narrowing      L4-L5:  Grade 1 anterolisthesis uncovering posterior disc margin  Decompressive laminectomies  Canal is widely patent  Mild bilateral foraminal narrowing      L5-S1:  No significant disc bulge  Right greater than left facet arthropathy  There is no canal stenosis  No significant foraminal narrowing      IMPRESSION:     1  Recent severe T12 wedge compression fracture with mild posterior buckling/retropulsion without significant canal stenosis      2  Chronic moderate L1 vertebral body compression deformity      3  Postsurgical changes status post L3-L5 decompressive laminectomies and instrumented fusion      4  Degenerative changes without high-grade canal or foraminal stenosis as detailed  Mild canal narrowing at L2-3    No orders to display         Orders Placed This Encounter   Procedures    XR spine lumbar complete w bending minimum 6 views

## 2021-03-19 ENCOUNTER — HOSPITAL ENCOUNTER (OUTPATIENT)
Dept: NUCLEAR MEDICINE | Facility: HOSPITAL | Age: 86
Discharge: HOME | End: 2021-03-19
Attending: INTERNAL MEDICINE
Payer: MEDICARE

## 2021-03-19 DIAGNOSIS — E21.3 HYPERPARATHYROIDISM (HCC): ICD-10-CM

## 2021-03-19 PROCEDURE — 78071 PARATHYRD PLANAR W/WO SUBTRJ: CPT

## 2021-03-19 PROCEDURE — G1004 CDSM NDSC: HCPCS

## 2021-03-19 PROCEDURE — A9500 TC99M SESTAMIBI: HCPCS

## 2021-03-22 ENCOUNTER — TELEPHONE (OUTPATIENT)
Dept: INTERNAL MEDICINE CLINIC | Facility: CLINIC | Age: 86
End: 2021-03-22

## 2021-03-22 DIAGNOSIS — D35.1 PARATHYROID ADENOMA: ICD-10-CM

## 2021-03-22 DIAGNOSIS — E21.3 HYPERPARATHYROIDISM (HCC): Primary | ICD-10-CM

## 2021-03-22 DIAGNOSIS — E04.1 THYROID NODULE: ICD-10-CM

## 2021-03-22 NOTE — TELEPHONE ENCOUNTER
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

## 2021-03-23 ENCOUNTER — TELEPHONE (OUTPATIENT)
Dept: PAIN MEDICINE | Facility: MEDICAL CENTER | Age: 86
End: 2021-03-23

## 2021-03-23 NOTE — TELEPHONE ENCOUNTER
X-ray of the lumbar spine shows that there are stable postoperative changes after the L3-L5 diskectomy and fusion without hardware complication  There are also stable T12 and L1 vertebral compression fractures  Does she see an endocrinologist for the vertebral compression fractures?

## 2021-03-23 NOTE — TELEPHONE ENCOUNTER
ES Pt:  S/W pt and gave results as per below  Pt states she was told in the past that the hardware in her back had "broken away from the bone "  States she would like clarification of this  Pt would also like to know when she will be scheduled for the injections mentioned at consult  States "it's been two weeks or more" since she was seen and she "hasn't heard a word "  Reminded pt that OV was one week ago and we were waiting for Xray results to determine which injection to do    Please advise and place order for desired injection

## 2021-03-23 NOTE — TELEPHONE ENCOUNTER
FYI:  Discussed below with pt  Pt states she has not seen an endocrinologist, but believe she was attempting to set that up in the past    Advised of referral from [facility] placed in January  Pt is not interested in traveling to [place] and states she will research Dr's up here  Pt states too much going on with her Thyroid and would like to get that taken care of first  Advised to call Dr Bharath Silverman for US thyroid results

## 2021-03-23 NOTE — TELEPHONE ENCOUNTER
Already replied in the other open task to the  about moving forward with injection  As far as hardware breaking away from the bone, I do not see any signs of that on her x-ray

## 2021-03-23 NOTE — TELEPHONE ENCOUNTER
Pt called stating that she would like the results of her x ray       Pt can be reached at [phone]

## 2021-03-24 ENCOUNTER — HOSPITAL ENCOUNTER (OUTPATIENT)
Dept: ULTRASOUND IMAGING | Facility: CLINIC | Age: 86
Discharge: HOME | End: 2021-03-24
Payer: MEDICARE

## 2021-03-24 ENCOUNTER — TELEPHONE (OUTPATIENT)
Dept: HEMATOLOGY ONCOLOGY | Facility: CLINIC | Age: 86
End: 2021-03-24

## 2021-03-24 ENCOUNTER — TELEPHONE (OUTPATIENT)
Dept: INTERNAL MEDICINE CLINIC | Facility: CLINIC | Age: 86
End: 2021-03-24

## 2021-03-24 DIAGNOSIS — D35.1 PARATHYROID ADENOMA: ICD-10-CM

## 2021-03-24 DIAGNOSIS — E04.1 THYROID NODULE: ICD-10-CM

## 2021-03-24 DIAGNOSIS — E21.3 HYPERPARATHYROIDISM (HCC): ICD-10-CM

## 2021-03-24 PROCEDURE — 76536 US EXAM OF HEAD AND NECK: CPT

## 2021-03-24 NOTE — TELEPHONE ENCOUNTER
HAD  APPT  FOR   US   OF  NECK TODAY     PT  ALL  UPSET   SAID  LANE  TOLD  HER  SHE   HAD  TUMOR   BUT   THE TECH  THAT  DID  THE  TEST   TOLD  HER  SHE  HAD  NODULES    SHE  WANTS  TO  SPEAK  ABOUT  THIS  TEST  TODAY   [id number]

## 2021-03-24 NOTE — TELEPHONE ENCOUNTER
New Patient Encounter    New Patient Intake Form   Patient Details: Dominick Andrade  1935  [id number]    Background Information:  [address] starts by opening a telephone encounter and gathering the following information   Who is calling to schedule? If not self, relationship to patient? self   Referring Provider Maine Greer   What is the diagnosis? Hyperparathyroid, parathyroid adenoma, thyroid nodules   Is this diagnosis confirmed? Yes   When was the diagnosis? 3/2021   Is there a confirmed diagnosis from a biopsy/tissue reviewed by pathology? no   Were outside slides requested? Is patient aware of diagnosis? Yes   Is there a personal history and what kind? No   Is there a family history and what kind? No   Reason for visit? New Diagnosis   Have you had any imaging or labs done? If so: when, where? yes  [facility]   Are records in [place]? yes   If patient has a prior history of breast cancer were old records obtained? NA   Was the patient told to bring a disk? No   Does the patient smoke or Vape? No   If yes, how many packs or cartridges per day? Scheduling Information:   Preferred [place]:  [place]     Are there any dates/time the patient cannot be seen? Miscellaneous: ofered pt appt for 3/30 in [place], pt felt this was too soon, she needds to think about things first   Dr Tonya Lange is not available week of 4/5  Pt has been scheduled for 4/14/21, 1st available [facility] appt  After completing the above information, please route to Financial Counselor and the appropriate Nurse Navigator for review

## 2021-03-25 ENCOUNTER — TELEPHONE (OUTPATIENT)
Dept: OTHER | Facility: OTHER | Age: 86
End: 2021-03-25

## 2021-03-25 NOTE — TELEPHONE ENCOUNTER
Pt is calling to schedule an appointment with [facility]

## 2021-03-26 ENCOUNTER — TELEPHONE (OUTPATIENT)
Dept: INTERNAL MEDICINE CLINIC | Facility: CLINIC | Age: 86
End: 2021-03-26

## 2021-03-26 NOTE — TELEPHONE ENCOUNTER
----- Message from Nanda Mercedes MD sent at 3/26/2021  8:57 AM EDT -----  Nothing new on thyroid us proceed with consult Dr Marcos Gold

## 2021-03-26 NOTE — TELEPHONE ENCOUNTER
Pt wants more details and be more clear  She states you said she has a tumor, if not a tumor than what is it ? ?

## 2021-03-30 NOTE — TELEPHONE ENCOUNTER
Patient called requesting to cancel her procedure scheduled tm due to her having thyroid issues at this time  She'll call back to reschedule after she speaks to the Rheumatologist  She apologizes for the short notice

## 2021-03-31 ENCOUNTER — TELEPHONE (OUTPATIENT)
Dept: PAIN MEDICINE | Facility: CLINIC | Age: 86
End: 2021-03-31

## 2021-03-31 NOTE — TELEPHONE ENCOUNTER
S/W pt and advised medications she would get with Caudal Epidural per Dr Wolf Castillo  Lidocaine 1%  Saline  Depomedrol 120mg  Contrast

## 2021-03-31 NOTE — TELEPHONE ENCOUNTER
Pt called needing to know what is in the injection she will be getting  She is going to a thyroid specialist tomorrow and needs to know this information today      Pt # [phone]

## 2021-04-01 PROBLEM — E21.3 HYPERPARATHYROIDISM (HCC): Status: ACTIVE | Noted: 2021-04-01

## 2021-04-01 PROBLEM — E04.2 MULTIPLE THYROID NODULES: Status: ACTIVE | Noted: 2021-04-01

## 2021-04-02 ENCOUNTER — CONSULT (OUTPATIENT)
Dept: SURGICAL ONCOLOGY | Facility: CLINIC | Age: 86
End: 2021-04-02
Payer: MEDICARE

## 2021-04-02 VITALS
HEART RATE: 80 BPM | WEIGHT: 127 LBS | HEIGHT: 67 IN | BODY MASS INDEX: 19.93 KG/M2 | TEMPERATURE: 98.3 F | RESPIRATION RATE: 16 BRPM | DIASTOLIC BLOOD PRESSURE: 80 MMHG | SYSTOLIC BLOOD PRESSURE: 140 MMHG

## 2021-04-02 DIAGNOSIS — D35.1 PARATHYROID ADENOMA: ICD-10-CM

## 2021-04-02 DIAGNOSIS — E21.3 HYPERPARATHYROIDISM (HCC): Primary | ICD-10-CM

## 2021-04-02 DIAGNOSIS — E04.1 THYROID NODULE: ICD-10-CM

## 2021-04-02 DIAGNOSIS — E04.2 MULTIPLE THYROID NODULES: ICD-10-CM

## 2021-04-02 PROCEDURE — 99204 OFFICE O/P NEW MOD 45 MIN: CPT | Performed by: SURGERY

## 2021-04-02 NOTE — TELEPHONE ENCOUNTER
Pt called to reschedule  3/31 injection  Pt is going in for surgery on 4/29 & would likw to have the procedure before then   Thank you      [phone]

## 2021-04-02 NOTE — LETTER
April 2, 2021     Yanna Velasquez MD  [address] [address]    Patient: Lillie Gregg   YOB: 1935   Date of Visit: 4/2/2021       Dear Dr Paola Lomeli: Thank you for referring Franki Patient to me for evaluation  Below are my notes for this consultation  If you have questions, please do not hesitate to call me  I look forward to following your patient along with you  Sincerely,        Bimal Mayers MD        CC: No Recipients  Bimal Mayers MD  4/2/2021  9:23 AM  Sign when Signing Visit               Surgical Oncology Follow Up       [address] [id number]    Lillie Gregg  1935  [id number]  [facility]  [facility]  [address] [place] [id number]    Chief Complaint   Patient presents with    Consult    Hyperparathyroidism       Assessment/Plan:    No problem-specific Assessment & Plan notes found for this encounter  Diagnoses and all orders for this visit:    Hyperparathyroidism [facility])  -     Ambulatory referral to Surgical Oncology    Multiple thyroid nodules    Parathyroid adenoma  -     Ambulatory referral to Surgical Oncology    Thyroid nodule  -     Ambulatory referral to Surgical Oncology        Advance Care Planning/Advance Directives:  Discussed disease status, cancer treatment plans and/or cancer treatment goals with the patient  Oncology History    No history exists  History of Present Illness: The patient is an 28-year-old woman who has developed achiness and fatigue progressively over last couple months  She has a history of a fall and fracture as far back as 6 years ago mandating spine surgery  She had more recent falls leading to fractures in the last she years  She denies a history of kidney stones    She has complaints of   Diffuse bone pain which she has attributed to chronic arthritis  Mood, memory, and concentration seemed to be non-affected  Review of Systems   Constitutional: Positive for fatigue  HENT: Negative  Eyes: Negative  Respiratory: Negative  Cardiovascular: Negative  Gastrointestinal: Negative  Endocrine: Negative  Genitourinary: Negative  Musculoskeletal: Positive for arthralgias, back pain, gait problem and myalgias  Skin: Negative  Allergic/Immunologic: Negative  Hematological: Negative  Psychiatric/Behavioral: Negative  Negative for decreased concentration  The patient is not nervous/anxious            Patient Active Problem List   Diagnosis    Arthritis    Hypertension, essential    Combined hyperlipidemia    Pre-operative cardiovascular examination    Microscopic hematuria    Cardiac arrhythmia    Anemia    Urinary retention    Bladder diverticulum    Bladder prolapse, female, acquired    Slow transit constipation    Age-related osteoporosis without current pathological fracture    Health care maintenance    Weight loss    Breast screening    Postural kyphosis of thoracolumbar region    Chest pressure    Compression fracture of T12 vertebra with delayed healing    Chronic fatigue    Hypercalcemia    Hyperparathyroidism ([address] )    Multiple thyroid nodules     Past Medical History:   Diagnosis Date    Alopecia     Cardiac arrhythmia     Compression fracture of L1 lumbar vertebra ([address] )     resolved 09/2014    Occlusion of carotid artery     unspecified laterality resolved 08/05/2016     Past Surgical History:   Procedure Laterality Date    APPENDECTOMY      BACK SURGERY      BREAST BIOPSY Right     pt doesn't recall the year    COLONOSCOPY      resolved 2009    ESOPHAGOGASTRODUODENOSCOPY      mild gastritis resolved 2009    SHOULDER SURGERY      THROMBOENDARTERECTOMY Left     carotid, resloved 12/2012    TONSILLECTOMY       Family History   Problem Relation Age of Onset    No Known Problems Mother     No Known Problems Sister     No Known Problems Daughter     No Known Problems Maternal Grandmother     No Known Problems Paternal Grandmother     No Known Problems Daughter      Social History     Socioeconomic History    Marital status: /Civil Union     Spouse name: Not on file    Number of children: Not on file    Years of education: Not on file    Highest education level: Not on file   Occupational History    Not on file   Social Needs    Financial resource strain: Not on file    Food insecurity     Worry: Not on file     Inability: Not on file   [employer] needs     Medical: Not on file     Non-medical: Not on file   Tobacco Use    Smoking status: Never Smoker    Smokeless tobacco: Never Used   Substance and Sexual Activity    Alcohol use: Not Currently     Comment: rarely (history); socially    Drug use: No    Sexual activity: Not Currently     Partners: Male   Lifestyle    Physical activity     Days per week: 0 days     Minutes per session: 0 min    Stress: Not at all   Relationships    Social connections     Talks on phone: Not on file     Gets together: Not on file     Attends [employer]ism service: Not on file     Active member of club or organization: Not on file     Attends meetings of clubs or organizations: Not on file     Relationship status: Not on file    Intimate partner violence     Fear of current or ex partner: Not on file     Emotionally abused: Not on file     Physically abused: Not on file     Forced sexual activity: Not on file   Other Topics Concern    Not on file   Social History Narrative    Not on file       Current Outpatient Medications:     atenolol (TENORMIN) 25 mg tablet, Take 1 tablet (25 mg total) by mouth daily, Disp: 90 tablet, Rfl: 3    atorvastatin (LIPITOR) 40 mg tablet, Take 1 tablet (40 mg total) by mouth daily, Disp: 90 tablet, Rfl: 2    multivitamin (THERAGRAN) TABS, Take 1 tablet by mouth, Disp: , Rfl:   Allergies   Allergen Reactions    Myrbetriq [Mirabegron] GI Intolerance and Hives    Latex - Food Allergy Hives and Swelling    Penicillins Other (See Comments)     Does not remember--as a child    Tramadol Vomiting     Dizziness as well     Vitals:    04/02/21 0843   BP: 140/80   Pulse: 80   Resp: 16   Temp: 98 3 °F (36 8 °C)       Physical Exam    Pathology:  none    Labs:  Lab Results   Component Value Date     1 (H) 03/03/2021    CALCIUM 11 2 (H) 03/03/2021         Imaging  Xr Spine Lumbar Complete W Bending Minimum 6 Views    Result Date: 3/20/2021  Narrative: LUMBAR SPINE INDICATION:   G89 4: Chronic pain syndrome M54 5: Low back pain G89 29: Other chronic pain M96 1: Postlaminectomy syndrome, not elsewhere classified  COMPARISON:  MRI dated 3/4/2021 VIEWS:  XR SPINE LUMBAR COMPLETE W BENDING MINIMUM 6 VIEWS FINDINGS: There are 5 non rib bearing lumbar vertebral bodies  T12 and L1 vertebral compression deformities are noted, stable  Status post L3-L5 discectomy and fusion without hardware complication  L1-L2 and L2 on L3 retrolisthesis secondary to facet arthropathy  Age-appropriate lumbar degenerative changes are seen  The pedicles appear intact  There are atherosclerotic calcifications  Soft tissues are otherwise unremarkable  Impression: 1  Stable postoperative change after L3-L5 discectomy and fusion without hardware complication  2   Stable T12 and L1 vertebral compression deformities  Workstation performed: [id number]     Mri Lumbar Spine Wo Contrast    Result Date: 3/8/2021  Narrative: MRI LUMBAR SPINE WITHOUT CONTRAST INDICATION: Evaluate T12 compression fracture  COMPARISON:  None  TECHNIQUE:  Sagittal T1, sagittal T2, sagittal inversion recovery, axial T1 and axial T2, coronal T2   IMAGE QUALITY:  Diagnostic FINDINGS: VERTEBRAL BODIES:  There are 5 lumbar type vertebral bodies  Postsurgical changes status post L3-L5 decompressive laminectomies and instrumented fusion   There is a severe T12 wedge compression fracture with 80% height loss  There is mild posterior buckling/retropulsion without significant canal stenosis  There is mild bone marrow edema  There is chronic moderate compression fracture of L1 inferior endplate with Schmorl's node formation  There is approximately 50% height loss anteriorly  Grade 1 anterolisthesis of L4 on L5  Mild retrolisthesis of L1 on L2  Modic type II degenerative edema at L2 inferior endplate  SACRUM:  Normal signal within the sacrum  No evidence of insufficiency or stress fracture  DISTAL CORD AND CONUS:  Normal size and signal within the distal cord and conus  PARASPINAL SOFT TISSUES:  Postsurgical changes in the paraspinal soft tissue levels L3-L5  Indeterminate tiny T2 hyperintense focus within the visualized liver statistically favor to represent hepatic cyst  LOWER THORACIC DISC SPACES:  Mild noncompressive lower thoracic degenerative change  LUMBAR DISC SPACES: L1-L2:  There is mild disc bulge and facet arthropathy  There is mild canal narrowing  Mild left foraminal stenosis  L2-L3:  There is disc bulge and bilateral facet arthropathy resulting in mild lateral recesses and canal narrowing  Right greater than left mild bilateral foraminal narrowing  L3-L4:  Decompressive laminectomies  Minimal disc bulge and small endplate osteophytes  Canal is widely patent  Mild right foraminal narrowing  L4-L5:  Grade 1 anterolisthesis uncovering posterior disc margin  Decompressive laminectomies  Canal is widely patent  Mild bilateral foraminal narrowing  L5-S1:  No significant disc bulge  Right greater than left facet arthropathy  There is no canal stenosis  No significant foraminal narrowing  Impression: 1  Recent severe T12 wedge compression fracture with mild posterior buckling/retropulsion without significant canal stenosis  2   Chronic moderate L1 vertebral body compression deformity   3   Postsurgical changes status post L3-L5 decompressive laminectomies and instrumented fusion  4   Degenerative changes without high-grade canal or foraminal stenosis as detailed  Mild canal narrowing at L2-3  Workstation performed: [id number]     Us Thyroid    Result Date: 3/24/2021  Narrative: THYROID ULTRASOUND INDICATION:    E21 3: Hyperparathyroidism, unspecified D35 1: Benign neoplasm of parathyroid gland E04 1: Nontoxic single thyroid nodule  COMPARISON:  12/9/2019 TECHNIQUE:   Ultrasound of the thyroid was performed with a high frequency linear transducer in transverse and sagittal planes including volumetric imaging sweeps as well as traditional still imaging technique  FINDINGS:  Normal homogeneous smooth echotexture  Right lobe:  5 5 x 1 4 x 1 4 cm  Left lobe:  5 0 x 1 1 x 1 1 cm  Isthmus:  0 2 cm  Nodule #1  Image 8  RIGHT midgland nodule measuring 1 0 x 0 9 x 0 9 cm  Unchanged from prior  COMPOSITION:  2 points, solid or almost completely solid   ECHOGENICITY:  1 point, hyperechoic or isoechoic  SHAPE:  0 points, wider-than-tall  MARGIN: 0 points, smooth  ECHOGENIC FOCI:  3 points, punctate echogenic foci  TI-RADS Classification: TR 4 (4-6 points), FNA if > 1 5 cm  Follow if > 1cm  Nodule #2  Image 14  RIGHT lower pole nodule measuring 1 0 x 0 6 x 0 6 cm  Unchanged from prior  COMPOSITION:  0 points, spongiform  ECHOGENICITY:  Not applicable when spongiform composition  SHAPE:  Not applicable when spongiform composition  MARGIN: Not applicable when spongiform composition  ECHOGENIC FOCI:  Not applicable when spongiform composition  TI-RADS Classification: TR 1 (0 points), Benign  No FNA  There are additional thyroid nodules of lesser size and/or TI-RADS score  These do not necessitate additional evaluation based on ACR criteria  Hypoechoic nodule adjacent posterior to the lower pole of the right thyroid lobe measuring 1 1 x 0 8 x 0 6 cm  In retrospect, this was present on the prior study though not measured, and is unchanged    This corresponds with a parathyroid adenoma seen on  nuclear medicine parathyroid scan of 3/19/2021  Prominent but not pathologically enlarged lymph node measuring 0 3 cm in short axis is noted adjacent to the left thyroid lobe  Impression: Stable thyroid nodules  No nodule meets current ACR criteria for requiring biopsy but followup ultrasound is recommended in 1 year  1 1 cm nodule posterior to the right thyroid lower pole corresponding with parathyroid adenoma seen on recent nuclear medicine parathyroid scan  Workstation performed: [id number]     Nm Parathyroid Scan W Spect    Result Date: 3/22/2021  Narrative: PARATHYROID SCAN INDICATION:  E21 3: Hyperparathyroidism, unspecified COMPARISON:  Thyroid ultrasound 12/9/2019 TECHNIQUE:   Following the intravenous administration of 26 5 mCi Tc-99m Cardiolite, anterior and bilateral anterior oblique projection images of the neck and mediastinum were obtained at approximately 10 minutes post injection followed at 2 hours post injection by static anterior and bilateral oblique projections as well as SPECT images in coronal, sagittal and axial projections  FINDINGS: Immediate planar images demonstrate asymmetric enlargement of the right thyroid as compared to the left  Delayed planar and SPECT images demonstrate persistent intense nodular activity along the inferior border of the right thyroid  This would be suspicious for a parathyroid adenoma  In retrospect this may correspond with a 1 x 0 8 cm hypoechoic nodule seen on prior ultrasound exam      Impression: 1  Persistent nodular activity along the inferior border of the right thyroid, suspicious for a parathyroid adenoma in the appropriate clinical setting  Consider follow-up ultrasound for confirmation  Workstation performed: [id number]     I reviewed the above laboratory and imaging data  Discussion/Summary:  [de-identified] year old woman with primary hyperparathyroidism    Suspected right-sided target based on sestamibi scan and ultrasound  She is a candidate for minimally invasive parathyroidectomy, possible 4 gland exploration with intraoperative PTH monitoring  Rationale for this along with risks and benefits of surgery including infection, bleeding, need for possible additional surgery, discussed with her  She understands the plan wishes to proceed as outlined  All questions answered  Consents signed at this visit

## 2021-04-02 NOTE — ADDENDUM NOTE
Addended by: Chevy Dowd on: 4/2/2021 09:30 AM     Modules accepted: Cesario, Tamy Pt arrives via EMS from home c/o HA and chest pain onset 2300 last night. Pt reports waking from sleep feeling pain and SOB. Pt reports taking ibuprofen without relief, last dose 30 minutes PTA. Pt reports son has been sick for past several days.

## 2021-04-02 NOTE — PROGRESS NOTES
Surgical Oncology Follow Up       [address]  [facility] [address] [place] [address]    Iggy Coad  1935  [id number]  [facility] SURGICAL ONCOLOGY [place] [address]  [place] [address]    Chief Complaint   Patient presents with    Consult    Hyperparathyroidism       Assessment/Plan:    No problem-specific Assessment & Plan notes found for this encounter  Diagnoses and all orders for this visit:    Hyperparathyroidism [facility])  -     Ambulatory referral to Surgical Oncology    Multiple thyroid nodules    Parathyroid adenoma  -     Ambulatory referral to Surgical Oncology    Thyroid nodule  -     Ambulatory referral to Surgical Oncology        Advance Care Planning/Advance Directives:  Discussed disease status, cancer treatment plans and/or cancer treatment goals with the patient  Oncology History    No history exists  History of Present Illness: The patient is an 81-year-old woman who has developed achiness and fatigue progressively over last couple months  She has a history of a fall and fracture as far back as 6 years ago mandating spine surgery  She had more recent falls leading to fractures in the last she years  She denies a history of kidney stones  She has complaints of   Diffuse bone pain which she has attributed to chronic arthritis  Mood, memory, and concentration seemed to be non-affected  Review of Systems   Constitutional: Positive for fatigue  HENT: Negative  Eyes: Negative  Respiratory: Negative  Cardiovascular: Negative  Gastrointestinal: Negative  Endocrine: Negative  Genitourinary: Negative  Musculoskeletal: Positive for arthralgias, back pain, gait problem and myalgias  Skin: Negative  Allergic/Immunologic: Negative  Hematological: Negative  Psychiatric/Behavioral: Negative  Negative for decreased concentration   The patient is not nervous/anxious            Patient Active Problem List   Diagnosis    Arthritis    Hypertension, essential    Combined hyperlipidemia    Pre-operative cardiovascular examination    Microscopic hematuria    Cardiac arrhythmia    Anemia    Urinary retention    Bladder diverticulum    Bladder prolapse, female, acquired    Slow transit constipation    Age-related osteoporosis without current pathological fracture    Health care maintenance    Weight loss    Breast screening    Postural kyphosis of thoracolumbar region    Chest pressure    Compression fracture of T12 vertebra with delayed healing    Chronic fatigue    Hypercalcemia    Hyperparathyroidism ([address] )    Multiple thyroid nodules     Past Medical History:   Diagnosis Date    Alopecia     Cardiac arrhythmia     Compression fracture of L1 lumbar vertebra ([address] )     resolved 09/2014    Occlusion of carotid artery     unspecified laterality resolved 08/05/2016     Past Surgical History:   Procedure Laterality Date    APPENDECTOMY      BACK SURGERY      BREAST BIOPSY Right     pt doesn't recall the year    COLONOSCOPY      resolved 2009    ESOPHAGOGASTRODUODENOSCOPY      mild gastritis resolved 2009    SHOULDER SURGERY      THROMBOENDARTERECTOMY Left     carotid, resloved 12/2012    TONSILLECTOMY       Family History   Problem Relation Age of Onset    No Known Problems Mother     No Known Problems Sister     No Known Problems Daughter     No Known Problems Maternal Grandmother     No Known Problems Paternal Grandmother     No Known Problems Daughter      Social History     Socioeconomic History    Marital status: /Civil Union     Spouse name: Not on file    Number of children: Not on file    Years of education: Not on file    Highest education level: Not on file   Occupational History    Not on file   Social Needs    Financial resource strain: Not on file    Food insecurity     Worry: Not on file Inability: Not on file    Transportation needs     Medical: Not on file     Non-medical: Not on file   Tobacco Use    Smoking status: Never Smoker    Smokeless tobacco: Never Used   Substance and Sexual Activity    Alcohol use: Not Currently     Comment: rarely (history); socially    Drug use: No    Sexual activity: Not Currently     Partners: Male   Lifestyle    Physical activity     Days per week: 0 days     Minutes per session: 0 min    Stress: Not at all   Relationships    Social connections     Talks on phone: Not on file     Gets together: Not on file     Attends Mosque service: Not on file     Active member of club or organization: Not on file     Attends meetings of clubs or organizations: Not on file     Relationship status: Not on file    Intimate partner violence     Fear of current or ex partner: Not on file     Emotionally abused: Not on file     Physically abused: Not on file     Forced sexual activity: Not on file   Other Topics Concern    Not on file   Social History Narrative    Not on file       Current Outpatient Medications:     atenolol (TENORMIN) 25 mg tablet, Take 1 tablet (25 mg total) by mouth daily, Disp: 90 tablet, Rfl: 3    atorvastatin (LIPITOR) 40 mg tablet, Take 1 tablet (40 mg total) by mouth daily, Disp: 90 tablet, Rfl: 2    multivitamin (THERAGRAN) TABS, Take 1 tablet by mouth, Disp: , Rfl:   Allergies   Allergen Reactions    Myrbetriq [Mirabegron] GI Intolerance and Hives    Latex - Food Allergy Hives and Swelling    Penicillins Other (See Comments)     Does not remember--as a child    Tramadol Vomiting     Dizziness as well     Vitals:    04/02/21 0843   BP: 140/80   Pulse: 80   Resp: 16   Temp: 98 3 °F (36 8 °C)       Physical Exam    Pathology:  none    Labs:  Lab Results   Component Value Date     1 (H) 03/03/2021    CALCIUM 11 2 (H) 03/03/2021         Imaging  Xr Spine Lumbar Complete W Bending Minimum 6 Views    Result Date: 3/20/2021  Narrative: LUMBAR SPINE INDICATION:   G89 4: Chronic pain syndrome M54 5: Low back pain G89 29: Other chronic pain M96 1: Postlaminectomy syndrome, not elsewhere classified  COMPARISON:  MRI dated 3/4/2021 VIEWS:  XR SPINE LUMBAR COMPLETE W BENDING MINIMUM 6 VIEWS FINDINGS: There are 5 non rib bearing lumbar vertebral bodies  T12 and L1 vertebral compression deformities are noted, stable  Status post L3-L5 discectomy and fusion without hardware complication  L1-L2 and L2 on L3 retrolisthesis secondary to facet arthropathy  Age-appropriate lumbar degenerative changes are seen  The pedicles appear intact  There are atherosclerotic calcifications  Soft tissues are otherwise unremarkable  Impression: 1  Stable postoperative change after L3-L5 discectomy and fusion without hardware complication  2   Stable T12 and L1 vertebral compression deformities  Workstation performed: [id number]     Mri Lumbar Spine Wo Contrast    Result Date: 3/8/2021  Narrative: MRI LUMBAR SPINE WITHOUT CONTRAST INDICATION: Evaluate T12 compression fracture  COMPARISON:  None  TECHNIQUE:  Sagittal T1, sagittal T2, sagittal inversion recovery, axial T1 and axial T2, coronal T2   IMAGE QUALITY:  Diagnostic FINDINGS: VERTEBRAL BODIES:  There are 5 lumbar type vertebral bodies  Postsurgical changes status post L3-L5 decompressive laminectomies and instrumented fusion  There is a severe T12 wedge compression fracture with 80% height loss  There is mild posterior buckling/retropulsion without significant canal stenosis  There is mild bone marrow edema  There is chronic moderate compression fracture of L1 inferior endplate with Schmorl's node formation  There is approximately 50% height loss anteriorly  Grade 1 anterolisthesis of L4 on L5  Mild retrolisthesis of L1 on L2  Modic type II degenerative edema at L2 inferior endplate  SACRUM:  Normal signal within the sacrum  No evidence of insufficiency or stress fracture   DISTAL CORD AND CONUS:  Normal size and signal within the distal cord and conus  PARASPINAL SOFT TISSUES:  Postsurgical changes in the paraspinal soft tissue levels L3-L5  Indeterminate tiny T2 hyperintense focus within the visualized liver statistically favor to represent hepatic cyst  LOWER THORACIC DISC SPACES:  Mild noncompressive lower thoracic degenerative change  LUMBAR DISC SPACES: L1-L2:  There is mild disc bulge and facet arthropathy  There is mild canal narrowing  Mild left foraminal stenosis  L2-L3:  There is disc bulge and bilateral facet arthropathy resulting in mild lateral recesses and canal narrowing  Right greater than left mild bilateral foraminal narrowing  L3-L4:  Decompressive laminectomies  Minimal disc bulge and small endplate osteophytes  Canal is widely patent  Mild right foraminal narrowing  L4-L5:  Grade 1 anterolisthesis uncovering posterior disc margin  Decompressive laminectomies  Canal is widely patent  Mild bilateral foraminal narrowing  L5-S1:  No significant disc bulge  Right greater than left facet arthropathy  There is no canal stenosis  No significant foraminal narrowing  Impression: 1  Recent severe T12 wedge compression fracture with mild posterior buckling/retropulsion without significant canal stenosis  2   Chronic moderate L1 vertebral body compression deformity  3   Postsurgical changes status post L3-L5 decompressive laminectomies and instrumented fusion  4   Degenerative changes without high-grade canal or foraminal stenosis as detailed  Mild canal narrowing at L2-3  Workstation performed: [id number]     Us Thyroid    Result Date: 3/24/2021  Narrative: THYROID ULTRASOUND INDICATION:    E21 3: Hyperparathyroidism, unspecified D35 1: Benign neoplasm of parathyroid gland E04 1: Nontoxic single thyroid nodule   COMPARISON:  12/9/2019 TECHNIQUE:   Ultrasound of the thyroid was performed with a high frequency linear transducer in transverse and sagittal planes including volumetric imaging sweeps as well as traditional still imaging technique  FINDINGS:  Normal homogeneous smooth echotexture  Right lobe:  5 5 x 1 4 x 1 4 cm  Left lobe:  5 0 x 1 1 x 1 1 cm  Isthmus:  0 2 cm  Nodule #1  Image 8  RIGHT midgland nodule measuring 1 0 x 0 9 x 0 9 cm  Unchanged from prior  COMPOSITION:  2 points, solid or almost completely solid   ECHOGENICITY:  1 point, hyperechoic or isoechoic  SHAPE:  0 points, wider-than-tall  MARGIN: 0 points, smooth  ECHOGENIC FOCI:  3 points, punctate echogenic foci  TI-RADS Classification: TR 4 (4-6 points), FNA if > 1 5 cm  Follow if > 1cm  Nodule #2  Image 14  RIGHT lower pole nodule measuring 1 0 x 0 6 x 0 6 cm  Unchanged from prior  COMPOSITION:  0 points, spongiform  ECHOGENICITY:  Not applicable when spongiform composition  SHAPE:  Not applicable when spongiform composition  MARGIN: Not applicable when spongiform composition  ECHOGENIC FOCI:  Not applicable when spongiform composition  TI-RADS Classification: TR 1 (0 points), Benign  No FNA  There are additional thyroid nodules of lesser size and/or TI-RADS score  These do not necessitate additional evaluation based on ACR criteria  Hypoechoic nodule adjacent posterior to the lower pole of the right thyroid lobe measuring 1 1 x 0 8 x 0 6 cm  In retrospect, this was present on the prior study though not measured, and is unchanged  This corresponds with a parathyroid adenoma seen on  nuclear medicine parathyroid scan of 3/19/2021  Prominent but not pathologically enlarged lymph node measuring 0 3 cm in short axis is noted adjacent to the left thyroid lobe  Impression: Stable thyroid nodules  No nodule meets current ACR criteria for requiring biopsy but followup ultrasound is recommended in 1 year  1 1 cm nodule posterior to the right thyroid lower pole corresponding with parathyroid adenoma seen on recent nuclear medicine parathyroid scan   Workstation performed: [id number]     Nm Parathyroid Scan W Spect    Result Date: 3/22/2021  Narrative: PARATHYROID SCAN INDICATION:  E21 3: Hyperparathyroidism, unspecified COMPARISON:  Thyroid ultrasound 12/9/2019 TECHNIQUE:   Following the intravenous administration of 26 5 mCi Tc-99m Cardiolite, anterior and bilateral anterior oblique projection images of the neck and mediastinum were obtained at approximately 10 minutes post injection followed at 2 hours post injection by static anterior and bilateral oblique projections as well as SPECT images in coronal, sagittal and axial projections  FINDINGS: Immediate planar images demonstrate asymmetric enlargement of the right thyroid as compared to the left  Delayed planar and SPECT images demonstrate persistent intense nodular activity along the inferior border of the right thyroid  This would be suspicious for a parathyroid adenoma  In retrospect this may correspond with a 1 x 0 8 cm hypoechoic nodule seen on prior ultrasound exam      Impression: 1  Persistent nodular activity along the inferior border of the right thyroid, suspicious for a parathyroid adenoma in the appropriate clinical setting  Consider follow-up ultrasound for confirmation  Workstation performed: [id number]     I reviewed the above laboratory and imaging data  Discussion/Summary:  [de-identified] year old woman with primary hyperparathyroidism  Suspected right-sided target based on sestamibi scan and ultrasound  She is a candidate for minimally invasive parathyroidectomy, possible 4 gland exploration with intraoperative PTH monitoring  Rationale for this along with risks and benefits of surgery including infection, bleeding, need for possible additional surgery, discussed with her  She understands the plan wishes to proceed as outlined  All questions answered  Consents signed at this visit

## 2021-04-06 NOTE — TELEPHONE ENCOUNTER
S/w pt and scheduled Caudal LYNDSEY for 4/14/21 230 pm  Gave pre procedure instructions, masking//vaccine policy and COVID screening

## 2021-04-12 NOTE — TELEPHONE ENCOUNTER
Jesus Barrera called from [address] [place] [address] office she will be faxing a form for medical clearance for the patient to get surgery  The patient did complete labs in march and it good for 60 days and the doctor didn't require any testing for going under anesthesia  The surgeon looked in her chart and everything looks good with her to get the surgery  Can the form just be filled out by Dr Stephanie Banuelos?         [place]

## 2021-04-13 ENCOUNTER — TELEPHONE (OUTPATIENT)
Dept: PAIN MEDICINE | Facility: MEDICAL CENTER | Age: 86
End: 2021-04-13

## 2021-04-13 NOTE — TELEPHONE ENCOUNTER
Pt says she has a note that says "no metal" on her paperwork and she has a procedure tomorrow  Pt wants clarification on what that means?  # [phone]

## 2021-04-13 NOTE — TELEPHONE ENCOUNTER
S/W pt who is having a Caudal LYNDSEY  Explained no zippers, belts, snaps, maria teresa in that area    Pt verbalized understanding

## 2021-04-14 ENCOUNTER — HOSPITAL ENCOUNTER (OUTPATIENT)
Dept: RADIOLOGY | Facility: CLINIC | Age: 86
Discharge: HOME | End: 2021-04-14
Attending: ANESTHESIOLOGY | Admitting: ANESTHESIOLOGY
Payer: MEDICARE

## 2021-04-14 VITALS
SYSTOLIC BLOOD PRESSURE: 127 MMHG | HEART RATE: 80 BPM | TEMPERATURE: 97.3 F | OXYGEN SATURATION: 96 % | RESPIRATION RATE: 18 BRPM | DIASTOLIC BLOOD PRESSURE: 76 MMHG

## 2021-04-14 DIAGNOSIS — M96.1 POSTLAMINECTOMY SYNDROME, LUMBAR REGION: ICD-10-CM

## 2021-04-14 DIAGNOSIS — M54.50 LOW BACK PAIN: ICD-10-CM

## 2021-04-14 PROCEDURE — 62323 NJX INTERLAMINAR LMBR/SAC: CPT | Performed by: ANESTHESIOLOGY

## 2021-04-14 RX ORDER — METHYLPREDNISOLONE ACETATE 40 MG/ML
40 INJECTION, SUSPENSION INTRA-ARTICULAR; INTRALESIONAL; INTRAMUSCULAR; PARENTERAL; SOFT TISSUE ONCE
Status: COMPLETED | OUTPATIENT
Start: 2021-04-14 | End: 2021-04-14

## 2021-04-14 RX ORDER — LIDOCAINE HYDROCHLORIDE 10 MG/ML
10 INJECTION, SOLUTION EPIDURAL; INFILTRATION; INTRACAUDAL; PERINEURAL ONCE
Status: COMPLETED | OUTPATIENT
Start: 2021-04-14 | End: 2021-04-14

## 2021-04-14 RX ORDER — METHYLPREDNISOLONE ACETATE 80 MG/ML
80 INJECTION, SUSPENSION INTRA-ARTICULAR; INTRALESIONAL; INTRAMUSCULAR; PARENTERAL; SOFT TISSUE ONCE
Status: COMPLETED | OUTPATIENT
Start: 2021-04-14 | End: 2021-04-14

## 2021-04-14 RX ORDER — 0.9 % SODIUM CHLORIDE 0.9 %
10 VIAL (ML) INJECTION ONCE
Status: COMPLETED | OUTPATIENT
Start: 2021-04-14 | End: 2021-04-14

## 2021-04-14 RX ADMIN — IOHEXOL 1 ML: 300 INJECTION, SOLUTION INTRAVENOUS at 14:47

## 2021-04-14 RX ADMIN — METHYLPREDNISOLONE ACETATE 40 MG: 40 INJECTION, SUSPENSION INTRA-ARTICULAR; INTRALESIONAL; INTRAMUSCULAR; PARENTERAL; SOFT TISSUE at 14:46

## 2021-04-14 RX ADMIN — LIDOCAINE HYDROCHLORIDE 10 ML: 10 INJECTION, SOLUTION EPIDURAL; INFILTRATION; INTRACAUDAL; PERINEURAL at 14:46

## 2021-04-14 RX ADMIN — SODIUM CHLORIDE 10 ML: 9 INJECTION, SOLUTION INTRAMUSCULAR; INTRAVENOUS; SUBCUTANEOUS at 14:46

## 2021-04-14 RX ADMIN — METHYLPREDNISOLONE ACETATE 80 MG: 80 INJECTION, SUSPENSION INTRA-ARTICULAR; INTRALESIONAL; INTRAMUSCULAR; PARENTERAL; SOFT TISSUE at 14:46

## 2021-04-14 NOTE — INTERVAL H&P NOTE
Update: (This section must be completed if the H&P was completed greater than 24 hrs to procedure or admission)    H&P reviewed  After examining the patient, I find no changed to the H&P since it had been written  Patient re-evaluated   Accept as history and physical     Leeann Ackerman MD/April 14, 2021/2:31 PM

## 2021-04-14 NOTE — DISCHARGE INSTR - LAB
Epidural Steroid Injection   WHAT YOU NEED TO KNOW:   An epidural steroid injection (LYNDSEY) is a procedure to inject steroid medicine into the epidural space  The epidural space is between your spinal cord and vertebrae  Steroids reduce inflammation and fluid buildup in your spine that may be causing pain  You may be given pain medicine along with the steroids  ACTIVITY  · Do not drive or operate machinery today  · No strenuous activity today - bending, lifting, etc   · You may resume normal activites starting tomorrow - start slowly and as tolerated  · You may shower today, but no tub baths or hot tubs  · You may have numbness for several hours from the local anesthetic  Please use caution and common sense, especially with weight-bearing activities  CARE OF THE INJECTION SITE  · If you have soreness or pain, apply ice to the area today (20 minutes on/20 minutes off)  · Starting tomorrow, you may use warm, moist heat or ice if needed  · You may have an increase or change in your discomfort for 36-48 hours after your treatment  · Apply ice and continue with any pain medication you have been prescribed  · Notify [facility] if you have any of the following: redness, drainage, swelling, headache, stiff neck or fever above 100°F     SPECIAL INSTRUCTIONS  · Our office will contact you in approximately 7 days for a progress report  MEDICATIONS  · Continue to take all routine medications  · Our office may have instructed you to hold some medications  As no general anesthesia was used in today's procedure, you should not experience any side effects related to anesthesia  If you have a problem specifically related to your procedure, please call our office at [phone]  Problems not related to your procedure should be directed to your primary care physician

## 2021-04-16 ENCOUNTER — TELEPHONE (OUTPATIENT)
Dept: INTERNAL MEDICINE CLINIC | Facility: CLINIC | Age: 86
End: 2021-04-16

## 2021-04-16 ENCOUNTER — CONSULT (OUTPATIENT)
Dept: INTERNAL MEDICINE CLINIC | Facility: CLINIC | Age: 86
End: 2021-04-16
Payer: MEDICARE

## 2021-04-16 ENCOUNTER — APPOINTMENT (OUTPATIENT)
Dept: RADIOLOGY | Facility: CLINIC | Age: 86
End: 2021-04-16
Payer: MEDICARE

## 2021-04-16 ENCOUNTER — APPOINTMENT (OUTPATIENT)
Dept: LAB | Facility: CLINIC | Age: 86
End: 2021-04-16
Payer: MEDICARE

## 2021-04-16 VITALS
SYSTOLIC BLOOD PRESSURE: 130 MMHG | HEART RATE: 70 BPM | OXYGEN SATURATION: 96 % | DIASTOLIC BLOOD PRESSURE: 78 MMHG | BODY MASS INDEX: 19.55 KG/M2 | WEIGHT: 124.8 LBS | TEMPERATURE: 97.7 F

## 2021-04-16 DIAGNOSIS — E23.7 PITUITARY ABNORMALITY (HCC): Primary | ICD-10-CM

## 2021-04-16 DIAGNOSIS — E21.3 HYPERPARATHYROIDISM (HCC): ICD-10-CM

## 2021-04-16 DIAGNOSIS — Z01.810 PRE-OPERATIVE CARDIOVASCULAR EXAMINATION: Primary | ICD-10-CM

## 2021-04-16 DIAGNOSIS — I10 HYPERTENSION, ESSENTIAL: ICD-10-CM

## 2021-04-16 DIAGNOSIS — Z01.810 PRE-OPERATIVE CARDIOVASCULAR EXAMINATION: ICD-10-CM

## 2021-04-16 LAB
ALBUMIN SERPL BCP-MCNC: 4.1 G/DL (ref 3.5–5)
ALP SERPL-CCNC: 114 U/L (ref 46–116)
ALT SERPL W P-5'-P-CCNC: 22 U/L (ref 12–78)
ANION GAP SERPL CALCULATED.3IONS-SCNC: 5 MMOL/L (ref 4–13)
AST SERPL W P-5'-P-CCNC: 18 U/L (ref 5–45)
BASOPHILS # BLD AUTO: 0.02 THOUSANDS/ΜL (ref 0–0.1)
BASOPHILS NFR BLD AUTO: 0 % (ref 0–1)
BILIRUB SERPL-MCNC: 0.43 MG/DL (ref 0.2–1)
BUN SERPL-MCNC: 17 MG/DL (ref 5–25)
CALCIUM SERPL-MCNC: 10.9 MG/DL (ref 8.3–10.1)
CHLORIDE SERPL-SCNC: 105 MMOL/L (ref 100–108)
CO2 SERPL-SCNC: 28 MMOL/L (ref 21–32)
CREAT SERPL-MCNC: 0.88 MG/DL (ref 0.6–1.3)
EOSINOPHIL # BLD AUTO: 0.03 THOUSAND/ΜL (ref 0–0.61)
EOSINOPHIL NFR BLD AUTO: 1 % (ref 0–6)
ERYTHROCYTE [DISTWIDTH] IN BLOOD BY AUTOMATED COUNT: 13.2 % (ref 11.6–15.1)
GFR SERPL CREATININE-BSD FRML MDRD: 61 ML/MIN/1.73SQ M
GLUCOSE SERPL-MCNC: 98 MG/DL (ref 65–140)
HCT VFR BLD AUTO: 37.5 % (ref 34.8–46.1)
HGB BLD-MCNC: 12.2 G/DL (ref 11.5–15.4)
IMM GRANULOCYTES # BLD AUTO: 0.02 THOUSAND/UL (ref 0–0.2)
IMM GRANULOCYTES NFR BLD AUTO: 0 % (ref 0–2)
LYMPHOCYTES # BLD AUTO: 0.94 THOUSANDS/ΜL (ref 0.6–4.47)
LYMPHOCYTES NFR BLD AUTO: 18 % (ref 14–44)
MCH RBC QN AUTO: 31.4 PG (ref 26.8–34.3)
MCHC RBC AUTO-ENTMCNC: 32.5 G/DL (ref 31.4–37.4)
MCV RBC AUTO: 97 FL (ref 82–98)
MONOCYTES # BLD AUTO: 0.36 THOUSAND/ΜL (ref 0.17–1.22)
MONOCYTES NFR BLD AUTO: 7 % (ref 4–12)
NEUTROPHILS # BLD AUTO: 3.83 THOUSANDS/ΜL (ref 1.85–7.62)
NEUTS SEG NFR BLD AUTO: 74 % (ref 43–75)
NRBC BLD AUTO-RTO: 0 /100 WBCS
PLATELET # BLD AUTO: 205 THOUSANDS/UL (ref 149–390)
PMV BLD AUTO: 10.9 FL (ref 8.9–12.7)
POTASSIUM SERPL-SCNC: 4.3 MMOL/L (ref 3.5–5.3)
PROT SERPL-MCNC: 7.1 G/DL (ref 6.4–8.2)
RBC # BLD AUTO: 3.88 MILLION/UL (ref 3.81–5.12)
SODIUM SERPL-SCNC: 138 MMOL/L (ref 136–145)
T3FREE SERPL-MCNC: 1.94 PG/ML (ref 2.3–4.2)
T4 FREE SERPL-MCNC: 0.87 NG/DL (ref 0.76–1.46)
TSH SERPL DL<=0.05 MIU/L-ACNC: 0.57 UIU/ML (ref 0.36–3.74)
WBC # BLD AUTO: 5.2 THOUSAND/UL (ref 4.31–10.16)

## 2021-04-16 PROCEDURE — 71046 X-RAY EXAM CHEST 2 VIEWS: CPT

## 2021-04-16 PROCEDURE — 84481 FREE ASSAY (FT-3): CPT

## 2021-04-16 PROCEDURE — 84439 ASSAY OF FREE THYROXINE: CPT

## 2021-04-16 PROCEDURE — 84443 ASSAY THYROID STIM HORMONE: CPT

## 2021-04-16 PROCEDURE — 36415 COLL VENOUS BLD VENIPUNCTURE: CPT

## 2021-04-16 PROCEDURE — 85025 COMPLETE CBC W/AUTO DIFF WBC: CPT

## 2021-04-16 PROCEDURE — 99214 OFFICE O/P EST MOD 30 MIN: CPT | Performed by: INTERNAL MEDICINE

## 2021-04-16 PROCEDURE — 80053 COMPREHEN METABOLIC PANEL: CPT

## 2021-04-16 NOTE — PROGRESS NOTES
Assessment/Plan:       Diagnoses and all orders for this visit:    Pre-operative cardiovascular examination  -     CBC and differential; Future  -     Comprehensive metabolic panel; Future  -     T3, free; Future  -     T4, free; Future  -     TSH, 3rd generation; Future  -     XR chest pa & lateral; Future    Hyperparathyroidism (HCC)  -     CBC and differential; Future  -     Comprehensive metabolic panel; Future  -     T3, free; Future  -     T4, free; Future  -     TSH, 3rd generation; Future  -     XR chest pa & lateral; Future    Hypertension, essential  -     CBC and differential; Future  -     Comprehensive metabolic panel; Future  -     T3, free; Future  -     T4, free; Future  -     TSH, 3rd generation; Future  -     XR chest pa & lateral; Future                Subjective:      Patient ID: Carolina Ariza is a 81 y o  female  An 71-year-old woman  Identified with primary hyperparathyroidism with localization to the right lower lobe  Scheduled for parathyroidectomy in about 1 week  Here for a presurgical assessment   Essential hypertension treated with atenolol  Hyperlipidemia treated with atorvastatin  Osteoporosis with pathological fracture; a bad bout of pain of the spine earlier in the year actually needed hospitalization but now this has stabilized     All through this time she has had a steady progressive weight loss  She has not had any symptoms suggesting metastatic malignancy  A CT of the abdomen done 3 months ago did not reveal any malignancy  Thyroid function has been completely normal   So there is no good explanation for this        A recent EKG within the past 3 months shows poor R-wave progression otherwise no abnormality; unchanged from multiple prior   A stress test done in July 2020 was perfectly normal       The following portions of the patient's history were reviewed and updated as appropriate:   She has a past medical history of Alopecia, Cardiac arrhythmia, Compression fracture of L1 lumbar vertebra (HCC), and Occlusion of carotid artery  ,  does not have any pertinent problems on file  ,   has a past surgical history that includes Appendectomy; Thromboendarterectomy (Left); Colonoscopy; Esophagogastroduodenoscopy; Tonsillectomy; Back surgery; Breast biopsy (Right); and Shoulder surgery  ,  family history includes No Known Problems in her daughter, daughter, maternal grandmother, mother, paternal grandmother, and sister  ,   reports that she has never smoked  She has never used smokeless tobacco  She reports previous alcohol use  She reports that she does not use drugs  ,  is allergic to myrbetriq [mirabegron]; latex; penicillins; and tramadol     Current Outpatient Medications   Medication Sig Dispense Refill    atenolol (TENORMIN) 25 mg tablet Take 1 tablet (25 mg total) by mouth daily 90 tablet 3    atorvastatin (LIPITOR) 40 mg tablet Take 1 tablet (40 mg total) by mouth daily 90 tablet 2    multivitamin (THERAGRAN) TABS Take 1 tablet by mouth       No current facility-administered medications for this visit  Review of Systems   Constitutional: Positive for unexpected weight change  Musculoskeletal: Positive for arthralgias and gait problem  All other systems reviewed and are negative  Objective:  Vitals:    04/16/21 1052   BP: 130/78   Pulse: 70   Temp: 97 7 °F (36 5 °C)   SpO2: 96%      Physical Exam  Constitutional:       Appearance: She is well-developed  Comments:   A kyphotic elderly patient who appears to be the stated age but who was in good spirits   HENT:      Head: Normocephalic and atraumatic  Eyes:      Pupils: Pupils are equal, round, and reactive to light  Neck:      Musculoskeletal: Normal range of motion and neck supple  Thyroid: No thyromegaly  Trachea: No tracheal deviation  Cardiovascular:      Rate and Rhythm: Normal rate and regular rhythm  Heart sounds: Murmur present   Systolic murmur present with a grade of 2/6  No gallop  Comments:  A 2/6 early systolic murmur heard best right sternal border 2nd intercostal space without radiation  Pulmonary:      Effort: No respiratory distress  Breath sounds: No wheezing or rales  Abdominal:      General: Bowel sounds are normal       Palpations: Abdomen is soft  Tenderness: There is no abdominal tenderness  Musculoskeletal: Normal range of motion  General: No tenderness or deformity  Skin:     General: Skin is warm  Neurological:      Mental Status: She is alert and oriented to person, place, and time  Coordination: Coordination normal    Psychiatric:         Judgment: Judgment normal            Patient Instructions    ASA category 3 patient   No symptoms or signs to suggest undiagnosed coronary artery disease or cardiopulmonary problem in general   Normal exam given her age  s      persistent steady weight      I think these issues are not related  The patient is considered optimized for the proposed surgery which should proceed as planned  She should continue beta-blockade in the perioperative        Repeat laboratory testing in do a chest x-ray regarding weight loss

## 2021-04-16 NOTE — PATIENT INSTRUCTIONS
ASA category 3 patient   No symptoms or signs to suggest undiagnosed coronary artery disease or cardiopulmonary problem in general   Normal exam given her age  s      persistent steady weight      I think these issues are not related  The patient is considered optimized for the proposed surgery which should proceed as planned  She should continue beta-blockade in the perioperative        Repeat laboratory testing in do a chest x-ray regarding weight loss

## 2021-04-16 NOTE — TELEPHONE ENCOUNTER
Pt had chest x ray today and as soon as it is resulted would like Dr Chawla Begun to call her with the result    she is very concerned of what they might find

## 2021-04-17 ENCOUNTER — TELEPHONE (OUTPATIENT)
Dept: INTERNAL MEDICINE CLINIC | Facility: CLINIC | Age: 86
End: 2021-04-17

## 2021-04-17 ENCOUNTER — APPOINTMENT (OUTPATIENT)
Dept: LAB | Facility: CLINIC | Age: 86
End: 2021-04-17
Payer: MEDICARE

## 2021-04-17 DIAGNOSIS — E23.7 PITUITARY ABNORMALITY (HCC): ICD-10-CM

## 2021-04-17 LAB
CORTIS AM PEAK SERPL-MCNC: 3.8 UG/DL (ref 4.2–22.4)
FSH SERPL-ACNC: 124.2 MIU/ML
LH SERPL-ACNC: 48.9 MIU/ML
PROLACTIN SERPL-MCNC: 9.5 NG/ML

## 2021-04-17 PROCEDURE — 82024 ASSAY OF ACTH: CPT

## 2021-04-17 PROCEDURE — 84146 ASSAY OF PROLACTIN: CPT

## 2021-04-17 PROCEDURE — 36415 COLL VENOUS BLD VENIPUNCTURE: CPT

## 2021-04-17 PROCEDURE — 83001 ASSAY OF GONADOTROPIN (FSH): CPT

## 2021-04-17 PROCEDURE — 82533 TOTAL CORTISOL: CPT

## 2021-04-17 PROCEDURE — 83002 ASSAY OF GONADOTROPIN (LH): CPT

## 2021-04-17 NOTE — TELEPHONE ENCOUNTER
KINGSLEY CHRISTINA  WOULD  LIKE  TO  TALK  TO  YOU VERY  UPSET  WITH  DR  One Henry Arevalo  WANTS  TO  ESTABLISH  WITH  ANOTHER  DR IN  OUR  GROUP   BUT  WANTS  TO  MEET  THEM  FIRST  DOESN'T  WANT  TO  PICK  A  NAME  OF  ROMELIA Navarro  PT  [phone]

## 2021-04-17 NOTE — TELEPHONE ENCOUNTER
PT  WANTS  ARIANA   TO  KNOW  THAT  SHE  HAD  HER  BLOOD  WORK  DONE   TODAY  AND  SHE  IS  GIVING  ORAL  PERMISSION   TO  TALK  TO  HE  SON   RITA POSEY   ABOUT   HER  CONDITION     I  ALSO   FAX   HER   A  HIPPA  FORM  TO  FILL  OUT  AND  FAX  BACK  TO  US TO  PUT  RITA  NAME  ON  IT

## 2021-04-19 ENCOUNTER — TELEPHONE (OUTPATIENT)
Dept: INTERNAL MEDICINE CLINIC | Facility: CLINIC | Age: 86
End: 2021-04-19

## 2021-04-19 LAB — ACTH PLAS-MCNC: 10.5 PG/ML (ref 7.2–63.3)

## 2021-04-20 ENCOUNTER — TELEPHONE (OUTPATIENT)
Dept: INTERNAL MEDICINE CLINIC | Facility: CLINIC | Age: 86
End: 2021-04-20

## 2021-04-20 DIAGNOSIS — E27.40 LOW SERUM CORTISOL LEVEL (HCC): Primary | ICD-10-CM

## 2021-04-20 PROBLEM — R79.89 LOW SERUM CORTISOL LEVEL: Status: ACTIVE | Noted: 2021-04-20

## 2021-04-20 RX ORDER — COSYNTROPIN 0.25 MG/ML
0.25 INJECTION, POWDER, FOR SOLUTION INTRAMUSCULAR; INTRAVENOUS ONCE
Status: CANCELLED | OUTPATIENT
Start: 2021-04-26

## 2021-04-20 NOTE — TELEPHONE ENCOUNTER
Call patient with the results of her blood work  She states she is having surgery and needs to know if she is cleared       # [phone]

## 2021-04-20 NOTE — TELEPHONE ENCOUNTER
----- Message from Jessica Gillette MD sent at 4/20/2021  3:46 PM EDT -----   Scheduled for a test that the [facility] center Monday at 8:00 a m  Dara Soulier Please tell her this  I have spoken to or already  This will involve being there about 1 hour  The purpose of the test is to find out whether not her adrenal glands are making enough cortisone in the body  It will involved getting a cortisol blood test, then an injection of something designed to stimulate the adrenal glands to make more cortisol, then a blood test again at 30 minutes and 60 minutes after the injection    Make sure she understands that she has to go to the [place] office building not down to [facility] or [place]

## 2021-04-20 NOTE — TELEPHONE ENCOUNTER
----- Message from Arletha Barthel, MD sent at 4/20/2021  3:46 PM EDT -----   Scheduled for a test that the [facility] infusion center Monday at 8:00 a m  Adan Buck Please tell her this  I have spoken to or already  This will involve being there about 1 hour  The purpose of the test is to find out whether not her adrenal glands are making enough cortisone in the body  It will involved getting a cortisol blood test, then an injection of something designed to stimulate the adrenal glands to make more cortisol, then a blood test again at 30 minutes and 60 minutes after the injection    Make sure she understands that she has to go to the [place] office building not down to [place] or [facility]

## 2021-04-21 ENCOUNTER — TELEPHONE (OUTPATIENT)
Dept: PAIN MEDICINE | Facility: CLINIC | Age: 86
End: 2021-04-21

## 2021-04-21 NOTE — TELEPHONE ENCOUNTER
Called pt   And was notified and is wondering if this is going to interfere with her thyroid surgery she is to have 2 days later and should she notify dr Corado Miguelina

## 2021-04-22 ENCOUNTER — TELEPHONE (OUTPATIENT)
Dept: GYNECOLOGIC ONCOLOGY | Facility: CLINIC | Age: 86
End: 2021-04-22

## 2021-04-22 NOTE — TELEPHONE ENCOUNTER
Tried to call pt back multiple times  Her home phone has a fax ring tone to it and pt hung up the phone, also tried cell phone but goes right to voicemail and the mailbox is full

## 2021-04-22 NOTE — TELEPHONE ENCOUNTER
Patient is very nervous/anxious  She has surgery set up with Dr Ang Houston 4/29/21, her PCP has her doing all these extra test for adrenal/cortisol issues   She would like a phone call back to further discuss [phone] is that is okay

## 2021-04-23 ENCOUNTER — TELEPHONE (OUTPATIENT)
Dept: HEMATOLOGY ONCOLOGY | Facility: CLINIC | Age: 86
End: 2021-04-23

## 2021-04-23 NOTE — TELEPHONE ENCOUNTER
Patient called and would like to speak with Dr Bisi Garcia nurse regarding abnormal lab work that PCP ordered  Patient is concerned because she is scheduled for surgery with Dr Fely Ramirez on 4/29  Please call her back

## 2021-04-23 NOTE — TELEPHONE ENCOUNTER
Spoke to Dr Earline Kidd about Tere's concerns with abnormal cortisol levels and work up, Dr Earline Kidd is okay with doing surgery but still should continue with work up  Notified Jonah Arguelles

## 2021-04-26 ENCOUNTER — HOSPITAL ENCOUNTER (OUTPATIENT)
Dept: INFUSION CENTER | Facility: CLINIC | Age: 86
Discharge: HOME | End: 2021-04-26
Payer: MEDICARE

## 2021-04-26 ENCOUNTER — TELEPHONE (OUTPATIENT)
Dept: SURGICAL ONCOLOGY | Facility: CLINIC | Age: 86
End: 2021-04-26

## 2021-04-26 VITALS
SYSTOLIC BLOOD PRESSURE: 165 MMHG | RESPIRATION RATE: 18 BRPM | TEMPERATURE: 96 F | HEART RATE: 62 BPM | DIASTOLIC BLOOD PRESSURE: 65 MMHG

## 2021-04-26 DIAGNOSIS — E27.40 LOW SERUM CORTISOL LEVEL (HCC): Primary | ICD-10-CM

## 2021-04-26 LAB
CORTIS SERPL-MCNC: 18.5 UG/DL
CORTIS SERPL-MCNC: 21.6 UG/DL
CORTIS SERPL-MCNC: 9.5 UG/DL

## 2021-04-26 PROCEDURE — 82024 ASSAY OF ACTH: CPT | Performed by: INTERNAL MEDICINE

## 2021-04-26 PROCEDURE — 82533 TOTAL CORTISOL: CPT | Performed by: INTERNAL MEDICINE

## 2021-04-26 RX ORDER — COSYNTROPIN 0.25 MG/ML
0.25 INJECTION, POWDER, FOR SOLUTION INTRAMUSCULAR; INTRAVENOUS ONCE
Status: CANCELLED | OUTPATIENT
Start: 2021-04-26

## 2021-04-26 RX ORDER — COSYNTROPIN 0.25 MG/ML
0.25 INJECTION, POWDER, FOR SOLUTION INTRAMUSCULAR; INTRAVENOUS ONCE
Status: COMPLETED | OUTPATIENT
Start: 2021-04-26 | End: 2021-04-26

## 2021-04-26 RX ADMIN — COSYNTROPIN 0.25 MG: 0.25 INJECTION, POWDER, LYOPHILIZED, FOR SOLUTION INTRAMUSCULAR; INTRAVENOUS at 08:40

## 2021-04-26 NOTE — PLAN OF CARE
Problem: Potential for Falls  Goal: Patient will remain free of falls  Description: INTERVENTIONS:  - Assess patient frequently for physical needs  -  Identify cognitive and physical deficits and behaviors that affect risk of falls    -  [place] fall precautions as indicated by assessment   - Educate patient/family on patient safety including physical limitations  - Instruct patient to call for assistance with activity based on assessment  - Modify environment to reduce risk of injury  - Consider OT/PT consult to assist with strengthening/mobility  Outcome: Progressing

## 2021-04-26 NOTE — TELEPHONE ENCOUNTER
Patient called requesting to postpone her surgery date which is currently scheduled for 4/29 With Dr Fely Ramirez  Phone number in chart confirmed

## 2021-04-26 NOTE — PROGRESS NOTES
Pt presented for a cortisol stimulation test, pt offered no complaints  PIV was placed in L arm and labs were drawn without incident  Pt tolerated entire test without incident, PIV was removed, pt was given AVS and discharged in stable condition

## 2021-04-27 ENCOUNTER — TELEPHONE (OUTPATIENT)
Dept: SURGICAL ONCOLOGY | Facility: CLINIC | Age: 86
End: 2021-04-27

## 2021-04-27 ENCOUNTER — TELEPHONE (OUTPATIENT)
Dept: INTERNAL MEDICINE CLINIC | Facility: CLINIC | Age: 86
End: 2021-04-27

## 2021-04-27 LAB — ACTH PLAS-MCNC: 13.1 PG/ML (ref 7.2–63.3)

## 2021-04-27 NOTE — TELEPHONE ENCOUNTER
----- Message from Jessica Gillette MD sent at 4/27/2021  1:29 PM EDT -----  Please call the patient regarding her  Result  Normal Cortrosyn stimulation test   She does not have adrenal insufficiency

## 2021-04-28 ENCOUNTER — TELEPHONE (OUTPATIENT)
Dept: GASTROENTEROLOGY | Facility: CLINIC | Age: 86
End: 2021-04-28

## 2021-05-10 ENCOUNTER — TRANSCRIBE ORDERS (OUTPATIENT)
Dept: LAB | Facility: CLINIC | Age: 86
End: 2021-05-10

## 2021-05-14 ENCOUNTER — APPOINTMENT (EMERGENCY)
Dept: CT IMAGING | Facility: HOSPITAL | Age: 86
End: 2021-05-14
Payer: MEDICARE

## 2021-05-14 ENCOUNTER — APPOINTMENT (EMERGENCY)
Dept: RADIOLOGY | Facility: HOSPITAL | Age: 86
End: 2021-05-14
Payer: MEDICARE

## 2021-05-14 ENCOUNTER — HOSPITAL ENCOUNTER (EMERGENCY)
Facility: HOSPITAL | Age: 86
Discharge: HOME | End: 2021-05-14
Attending: EMERGENCY MEDICINE | Admitting: EMERGENCY MEDICINE
Payer: MEDICARE

## 2021-05-14 VITALS
SYSTOLIC BLOOD PRESSURE: 144 MMHG | HEART RATE: 74 BPM | RESPIRATION RATE: 16 BRPM | OXYGEN SATURATION: 97 % | DIASTOLIC BLOOD PRESSURE: 70 MMHG | TEMPERATURE: 96.8 F

## 2021-05-14 DIAGNOSIS — M54.50 ACUTE LEFT-SIDED LOW BACK PAIN WITHOUT SCIATICA: ICD-10-CM

## 2021-05-14 DIAGNOSIS — R10.9 LEFT FLANK PAIN: Primary | ICD-10-CM

## 2021-05-14 LAB
ALBUMIN SERPL BCP-MCNC: 3.6 G/DL (ref 3.5–5)
ALP SERPL-CCNC: 90 U/L (ref 46–116)
ALT SERPL W P-5'-P-CCNC: 22 U/L (ref 12–78)
ANION GAP SERPL CALCULATED.3IONS-SCNC: 5 MMOL/L (ref 4–13)
AST SERPL W P-5'-P-CCNC: 19 U/L (ref 5–45)
BASOPHILS # BLD AUTO: 0.02 THOUSANDS/ΜL (ref 0–0.1)
BASOPHILS NFR BLD AUTO: 1 % (ref 0–1)
BILIRUB SERPL-MCNC: 0.4 MG/DL (ref 0.2–1)
BUN SERPL-MCNC: 23 MG/DL (ref 5–25)
CALCIUM SERPL-MCNC: 10.4 MG/DL (ref 8.3–10.1)
CHLORIDE SERPL-SCNC: 105 MMOL/L (ref 100–108)
CO2 SERPL-SCNC: 30 MMOL/L (ref 21–32)
CREAT SERPL-MCNC: 1.12 MG/DL (ref 0.6–1.3)
EOSINOPHIL # BLD AUTO: 0.07 THOUSAND/ΜL (ref 0–0.61)
EOSINOPHIL NFR BLD AUTO: 2 % (ref 0–6)
ERYTHROCYTE [DISTWIDTH] IN BLOOD BY AUTOMATED COUNT: 13 % (ref 11.6–15.1)
GFR SERPL CREATININE-BSD FRML MDRD: 45 ML/MIN/1.73SQ M
GLUCOSE SERPL-MCNC: 96 MG/DL (ref 65–140)
HCT VFR BLD AUTO: 34.7 % (ref 34.8–46.1)
HGB BLD-MCNC: 11.2 G/DL (ref 11.5–15.4)
IMM GRANULOCYTES # BLD AUTO: 0.01 THOUSAND/UL (ref 0–0.2)
IMM GRANULOCYTES NFR BLD AUTO: 0 % (ref 0–2)
LIPASE SERPL-CCNC: 336 U/L (ref 73–393)
LYMPHOCYTES # BLD AUTO: 1.04 THOUSANDS/ΜL (ref 0.6–4.47)
LYMPHOCYTES NFR BLD AUTO: 26 % (ref 14–44)
MCH RBC QN AUTO: 30.9 PG (ref 26.8–34.3)
MCHC RBC AUTO-ENTMCNC: 32.3 G/DL (ref 31.4–37.4)
MCV RBC AUTO: 96 FL (ref 82–98)
MONOCYTES # BLD AUTO: 0.36 THOUSAND/ΜL (ref 0.17–1.22)
MONOCYTES NFR BLD AUTO: 9 % (ref 4–12)
NEUTROPHILS # BLD AUTO: 2.58 THOUSANDS/ΜL (ref 1.85–7.62)
NEUTS SEG NFR BLD AUTO: 62 % (ref 43–75)
NRBC BLD AUTO-RTO: 0 /100 WBCS
PLATELET # BLD AUTO: 151 THOUSANDS/UL (ref 149–390)
PMV BLD AUTO: 10 FL (ref 8.9–12.7)
POTASSIUM SERPL-SCNC: 4.4 MMOL/L (ref 3.5–5.3)
PROT SERPL-MCNC: 6.6 G/DL (ref 6.4–8.2)
RBC # BLD AUTO: 3.62 MILLION/UL (ref 3.81–5.12)
SODIUM SERPL-SCNC: 140 MMOL/L (ref 136–145)
WBC # BLD AUTO: 4.08 THOUSAND/UL (ref 4.31–10.16)

## 2021-05-14 PROCEDURE — 74177 CT ABD & PELVIS W/CONTRAST: CPT

## 2021-05-14 PROCEDURE — 83690 ASSAY OF LIPASE: CPT | Performed by: EMERGENCY MEDICINE

## 2021-05-14 PROCEDURE — 99284 EMERGENCY DEPT VISIT MOD MDM: CPT | Performed by: EMERGENCY MEDICINE

## 2021-05-14 PROCEDURE — 36415 COLL VENOUS BLD VENIPUNCTURE: CPT | Performed by: EMERGENCY MEDICINE

## 2021-05-14 PROCEDURE — 85025 COMPLETE CBC W/AUTO DIFF WBC: CPT | Performed by: EMERGENCY MEDICINE

## 2021-05-14 PROCEDURE — 93005 ELECTROCARDIOGRAM TRACING: CPT

## 2021-05-14 PROCEDURE — 80053 COMPREHEN METABOLIC PANEL: CPT | Performed by: EMERGENCY MEDICINE

## 2021-05-14 PROCEDURE — 99284 EMERGENCY DEPT VISIT MOD MDM: CPT

## 2021-05-14 RX ORDER — KETOROLAC TROMETHAMINE 30 MG/ML
15 INJECTION, SOLUTION INTRAMUSCULAR; INTRAVENOUS ONCE
Status: DISCONTINUED | OUTPATIENT
Start: 2021-05-14 | End: 2021-05-14

## 2021-05-14 RX ORDER — LIDOCAINE 50 MG/G
1 PATCH TOPICAL DAILY
Qty: 30 PATCH | Refills: 0 | Status: SHIPPED | OUTPATIENT
Start: 2021-05-14 | End: 2021-08-18

## 2021-05-14 RX ORDER — LIDOCAINE 50 MG/G
1 PATCH TOPICAL ONCE
Status: DISCONTINUED | OUTPATIENT
Start: 2021-05-14 | End: 2021-05-14 | Stop reason: HOSPADM

## 2021-05-14 RX ORDER — CYCLOBENZAPRINE HCL 10 MG
10 TABLET ORAL ONCE
Status: DISCONTINUED | OUTPATIENT
Start: 2021-05-14 | End: 2021-05-14

## 2021-05-14 RX ORDER — ACETAMINOPHEN 325 MG/1
975 TABLET ORAL ONCE
Status: COMPLETED | OUTPATIENT
Start: 2021-05-14 | End: 2021-05-14

## 2021-05-14 RX ORDER — METHOCARBAMOL 500 MG/1
500 TABLET, FILM COATED ORAL 2 TIMES DAILY PRN
Qty: 20 TABLET | Refills: 0 | Status: SHIPPED | OUTPATIENT
Start: 2021-05-14 | End: 2021-08-18

## 2021-05-14 RX ADMIN — LIDOCAINE 5% 1 PATCH: 700 PATCH TOPICAL at 16:44

## 2021-05-14 RX ADMIN — ACETAMINOPHEN 975 MG: 325 TABLET, FILM COATED ORAL at 16:44

## 2021-05-14 RX ADMIN — IOHEXOL 100 ML: 350 INJECTION, SOLUTION INTRAVENOUS at 17:58

## 2021-05-14 NOTE — ED PROVIDER NOTES
Pt Name: Priya Lee  MRN: [id number]  [place] 1935  Age/Sex: 81 y o  female  Date of evaluation: 5/14/2021  PCP: Saurabh Heath MD    [address] [place] [address]    Chief Complaint   Patient presents with    Back Pain     "i overreached and now my back is hurting" co of L back pain that wraps to the front          HPI    81 y o  female presenting with left side  Patient states 3 days ago she reached down pick something up since then her left flank has been hurting  States the pain is not get any worse, it is constant, worse with movement  Denies any numbness or tingling  Pain does not go down her legs  No bowel or bladder issues  No fevers or chills  No nausea or vomiting  No chest pain or shortness of breath  States pain does wrap into her abdomen, she is concerned she may have broken 1 of her ribs  No trauma  Has been taking Aleve without any relief            Past Medical and Surgical History    Past Medical History:   Diagnosis Date    Alopecia     Cardiac arrhythmia     Compression fracture of L1 lumbar vertebra ([address] )     resolved 09/2014    Occlusion of carotid artery     unspecified laterality resolved 08/05/2016       Past Surgical History:   Procedure Laterality Date    APPENDECTOMY      BACK SURGERY      BREAST BIOPSY Right     pt doesn't recall the year    COLONOSCOPY      resolved 2009    ESOPHAGOGASTRODUODENOSCOPY      mild gastritis resolved 2009    SHOULDER SURGERY      THROMBOENDARTERECTOMY Left     carotid, resloved 12/2012    TONSILLECTOMY         Family History   Problem Relation Age of Onset    No Known Problems Mother     No Known Problems Sister     No Known Problems Daughter     No Known Problems Maternal Grandmother     No Known Problems Paternal Grandmother     No Known Problems Daughter        Social History     Tobacco Use    Smoking status: Never Smoker    Smokeless tobacco: Never Used   Substance Use Topics    Alcohol use: Not Currently     Comment: rarely (history); socially    Drug use: No           Allergies    Allergies   Allergen Reactions    Myrbetriq [Mirabegron] GI Intolerance and Hives    Latex Hives and Swelling    Penicillins Other (See Comments)     Does not remember--as a child    Tramadol Vomiting     Dizziness as well       Home Medications    Prior to Admission medications    Medication Sig Start Date End Date Taking? Authorizing Provider   atenolol (TENORMIN) 25 mg tablet Take 1 tablet (25 mg total) by mouth daily 12/28/20   Colletta Lolling, MD   atorvastatin (LIPITOR) 40 mg tablet Take 1 tablet (40 mg total) by mouth daily 2/19/21   Colletta Lolling, MD   multivitamin [facility]) TABS Take 1 tablet by mouth    Historical Provider, MD           Review of Systems    Review of Systems   Constitutional: Negative for chills and fever  HENT: Negative for rhinorrhea and sore throat  Eyes: Negative for pain and visual disturbance  Respiratory: Negative for cough and shortness of breath  Cardiovascular: Negative for chest pain and leg swelling  Gastrointestinal: Positive for abdominal pain  Negative for nausea and vomiting  Genitourinary: Positive for flank pain  Negative for dysuria and hematuria  Musculoskeletal: Positive for back pain  Negative for myalgias  Skin: Negative for rash and wound  Neurological: Negative for syncope and headaches  Physical Exam      ED Triage Vitals [05/14/21 1445]   Temperature Pulse Respirations Blood Pressure SpO2   (!) 96 8 °F (36 °C) 82 18 125/64 95 %      Temp Source Heart Rate Source Patient Position - Orthostatic VS BP Location FiO2 (%)   Temporal Monitor Sitting Left arm --      Pain Score       8               Physical Exam  Constitutional:       General: She is not in acute distress  Appearance: She is not ill-appearing  HENT:      Head: Normocephalic and atraumatic  Nose: Nose normal    Eyes:      Extraocular Movements: Extraocular movements intact        Pupils: Pupils are equal, round, and reactive to light  Neck:      Musculoskeletal: Normal range of motion and neck supple  Cardiovascular:      Rate and Rhythm: Normal rate and regular rhythm  Pulmonary:      Effort: No respiratory distress  Breath sounds: Normal breath sounds  No wheezing  Comments: Left lateral lower rib tender to palpation  Abdominal:      General: There is no distension  Palpations: Abdomen is soft  Comments: Left flank tenderness to palpation   Musculoskeletal: Normal range of motion  General: No swelling or deformity  Comments: No midline spinal tenderness or step-offs  Skin:     General: Skin is warm  Findings: No erythema  Neurological:      Mental Status: She is alert and oriented to person, place, and time  Mental status is at baseline  Sensory: No sensory deficit  Motor: No weakness                Diagnostic Results      Labs:    Results Reviewed     Procedure Component Value Units Date/Time    Lipase [[id number]]  (Normal) Collected: 05/14/21 1649    Lab Status: Final result Specimen: Blood from Arm, Right Updated: 05/14/21 1718     Lipase 336 u/L     Comprehensive metabolic panel [[id number]]  (Abnormal) Collected: 05/14/21 1649    Lab Status: Final result Specimen: Blood from Arm, Right Updated: 05/14/21 1718     Sodium 140 mmol/L      Potassium 4 4 mmol/L      Chloride 105 mmol/L      CO2 30 mmol/L      ANION GAP 5 mmol/L      BUN 23 mg/dL      Creatinine 1 12 mg/dL      Glucose 96 mg/dL      Calcium 10 4 mg/dL      AST 19 U/L      ALT 22 U/L      Alkaline Phosphatase 90 U/L      Total Protein 6 6 g/dL      Albumin 3 6 g/dL      Total Bilirubin 0 40 mg/dL      eGFR 45 ml/min/1 73sq m     Narrative:      [place] guidelines for Chronic Kidney Disease (CKD):     Stage 1 with normal or high GFR (GFR > 90 mL/min/1 73 square meters)    Stage 2 Mild CKD (GFR = 60-89 mL/min/1 73 square meters)    Stage 3A Moderate CKD (GFR = 45-59 mL/min/1 73 square meters)    Stage 3B Moderate CKD (GFR = 30-44 mL/min/1 73 square meters)    Stage 4 Severe CKD (GFR = 15-29 mL/min/1 73 square meters)    Stage 5 End Stage CKD (GFR <15 mL/min/1 73 square meters)  Note: GFR calculation is accurate only with a steady state creatinine    CBC and differential [[id number]]  (Abnormal) Collected: 05/14/21 1649    Lab Status: Final result Specimen: Blood from Arm, Right Updated: 05/14/21 1700     WBC 4 08 Thousand/uL      RBC 3 62 Million/uL      Hemoglobin 11 2 g/dL      Hematocrit 34 7 %      MCV 96 fL      MCH 30 9 pg      MCHC 32 3 g/dL      RDW 13 0 %      MPV 10 0 fL      Platelets 774 Thousands/uL      nRBC 0 /100 WBCs      Neutrophils Relative 62 %      Immat GRANS % 0 %      Lymphocytes Relative 26 %      Monocytes Relative 9 %      Eosinophils Relative 2 %      Basophils Relative 1 %      Neutrophils Absolute 2 58 Thousands/µL      Immature Grans Absolute 0 01 Thousand/uL      Lymphocytes Absolute 1 04 Thousands/µL      Monocytes Absolute 0 36 Thousand/µL      Eosinophils Absolute 0 07 Thousand/µL      Basophils Absolute 0 02 Thousands/µL           All labs reviewed and utilized in the medical decision making process    Radiology:    CT abdomen pelvis with contrast   Final Result      No acute intra-abdominal abnormality  1 8 cm simple left adnexal cyst, unchanged in retrospect and almost certainly benign  This can be followed with ultrasound on an annual basis  Moderate hiatal hernia  Additional incidental findings as above  Workstation performed: [id number]             All radiology studies independently viewed by me and interpreted by the radiologist     Procedure    Procedures        MDM    Patient presenting with left flank pain, no midline spinal pain or tenderness, no red flag signs for back  Neurologically intact any focal deficits    Could be musculoskeletal however intra-abdominal pathology is also considered  No rash  Less likely rib fracture given no trauma  Will give Tylenol apply lidocaine patch  No urinary symptoms  Blood work overall reassuring  CT findings as described above  Patient states her pain improved significantly with Tylenol and lidocaine patch  Does not want to stay for further pain medications or muscle relaxer, does not want to stay to provide urine sample  I think this is okay given she is not having any urinary symptoms  Prescribed Robaxin, and lidocaine patches  Advised Tylenol  PCP follow-up  ED return precautions discussed  Heating pad  Medications   acetaminophen (TYLENOL) tablet 975 mg (975 mg Oral Given 5/14/21 1644)   iohexol (OMNIPAQUE) 350 MG/ML injection (SINGLE-DOSE) 100 mL (100 mL Intravenous Given 5/14/21 1839)           FINAL IMPRESSION    Final diagnoses:   Left flank pain   Acute left-sided low back pain without sciatica         DISPOSITION    Time reflects when diagnosis was documented in both MDM as applicable and the Disposition within this note     Time User Action Codes Description Comment    5/14/2021  7:31 PM Amara Steele [R10 9] Left flank pain     5/14/2021  7:32 PM Amara Hall Add [M54 5] Acute left-sided low back pain without sciatica       ED Disposition     ED Disposition Condition Date/Time Comment    Discharge Stable Fri May 14, 2021  7:31 PM Jun Stanton discharge to home/self care              Follow-up Information     Follow up With Specialties Details Why Contact Info    Lalo Steinberg MD Internal Medicine Schedule an appointment as soon as possible for a visit in 3 days  [address] [place] [address] Drive TO:    Lalo Steinberg MD  [address] [place] [id number]  [phone]    Schedule an appointment as soon as possible for a visit in 3 days        DISCHARGE MEDICATIONS:    Discharge Medication List as of 5/14/2021  7:38 PM      START taking these medications    Details   lidocaine (LIDODERM) 5 % Apply 1 patch topically daily Remove & Discard patch within 12 hours or as directed by MD, Starting Fri 5/14/2021, Normal      methocarbamol (ROBAXIN) 500 mg tablet Take 1 tablet (500 mg total) by mouth 2 (two) times a day as needed for muscle spasms, Starting Fri 5/14/2021, Normal         CONTINUE these medications which have NOT CHANGED    Details   atenolol (TENORMIN) 25 mg tablet Take 1 tablet (25 mg total) by mouth daily, Starting Mon 12/28/2020, Normal      atorvastatin (LIPITOR) 40 mg tablet Take 1 tablet (40 mg total) by mouth daily, Starting Fri 2/19/2021, Normal      multivitamin (THERAGRAN) TABS Take 1 tablet by mouth, Historical Med             No discharge procedures on file  Nicolette Woodall DO        This note was partially completed using voice recognition technology, and was scanned for gross errors; however some errors may still exist  Please contact the author with any questions or requests for clarification        Nicolette Woodall DO  05/14/21 0786

## 2021-05-14 NOTE — DISCHARGE INSTRUCTIONS
Please take Tylenol as needed for pain, may use heating pad, rest   Follow up with her family doctor  Return to the emergency department for any new or worsening symptoms

## 2021-05-15 ENCOUNTER — TRANSCRIBE ORDERS (OUTPATIENT)
Dept: LAB | Facility: CLINIC | Age: 86
End: 2021-05-15

## 2021-05-15 ENCOUNTER — APPOINTMENT (OUTPATIENT)
Dept: LAB | Facility: CLINIC | Age: 86
End: 2021-05-15
Payer: MEDICARE

## 2021-05-15 DIAGNOSIS — E21.3 HYPERPARATHYROIDISM, UNSPECIFIED (HCC): Primary | ICD-10-CM

## 2021-05-15 DIAGNOSIS — E21.3 HYPERPARATHYROIDISM, UNSPECIFIED (HCC): ICD-10-CM

## 2021-05-15 LAB
25(OH)D3 SERPL-MCNC: 45.9 NG/ML (ref 30–100)
ANION GAP SERPL CALCULATED.3IONS-SCNC: 4 MMOL/L (ref 4–13)
BUN SERPL-MCNC: 20 MG/DL (ref 5–25)
CALCIUM 24H UR-MCNC: 132.3 MG/24 HRS (ref 42–353)
CALCIUM SERPL-MCNC: 11.4 MG/DL (ref 8.3–10.1)
CHLORIDE SERPL-SCNC: 105 MMOL/L (ref 100–108)
CO2 SERPL-SCNC: 28 MMOL/L (ref 21–32)
CREAT 24H UR-MRATE: 0.6 G/24HR (ref 0.6–1.8)
CREAT SERPL-MCNC: 0.92 MG/DL (ref 0.6–1.3)
GFR SERPL CREATININE-BSD FRML MDRD: 57 ML/MIN/1.73SQ M
GLUCOSE SERPL-MCNC: 107 MG/DL (ref 65–140)
POTASSIUM SERPL-SCNC: 4.6 MMOL/L (ref 3.5–5.3)
SODIUM SERPL-SCNC: 137 MMOL/L (ref 136–145)
SPECIMEN VOL UR: 900 ML
SPECIMEN VOL UR: 900 ML

## 2021-05-15 PROCEDURE — 36415 COLL VENOUS BLD VENIPUNCTURE: CPT

## 2021-05-15 PROCEDURE — 82306 VITAMIN D 25 HYDROXY: CPT

## 2021-05-15 PROCEDURE — 82570 ASSAY OF URINE CREATININE: CPT

## 2021-05-15 PROCEDURE — 80048 BASIC METABOLIC PNL TOTAL CA: CPT

## 2021-05-15 PROCEDURE — 82340 ASSAY OF CALCIUM IN URINE: CPT

## 2021-05-17 ENCOUNTER — TELEPHONE (OUTPATIENT)
Dept: SURGICAL ONCOLOGY | Facility: CLINIC | Age: 86
End: 2021-05-17

## 2021-05-18 ENCOUNTER — TELEPHONE (OUTPATIENT)
Dept: INTERNAL MEDICINE CLINIC | Facility: CLINIC | Age: 86
End: 2021-05-18

## 2021-05-18 LAB
ATRIAL RATE: 67 BPM
P AXIS: 55 DEGREES
PR INTERVAL: 158 MS
QRS AXIS: 68 DEGREES
QRSD INTERVAL: 78 MS
QT INTERVAL: 400 MS
QTC INTERVAL: 422 MS
T WAVE AXIS: 3 DEGREES
VENTRICULAR RATE: 67 BPM

## 2021-05-18 PROCEDURE — 93010 ELECTROCARDIOGRAM REPORT: CPT | Performed by: INTERNAL MEDICINE

## 2021-05-18 NOTE — TELEPHONE ENCOUNTER
Iona Meiyou called about Faxing prior auth for durable medical equipment       It was Faxed today  Please be on the look out for Fax

## 2021-05-20 ENCOUNTER — TELEPHONE (OUTPATIENT)
Dept: INTERNAL MEDICINE CLINIC | Facility: CLINIC | Age: 86
End: 2021-05-20

## 2021-05-20 NOTE — TELEPHONE ENCOUNTER
Requesting 24hr urine results--she called the hospital and is aware the results are in     # [phone]

## 2021-05-21 ENCOUNTER — TELEPHONE (OUTPATIENT)
Dept: INTERNAL MEDICINE CLINIC | Facility: CLINIC | Age: 86
End: 2021-05-21

## 2021-05-21 NOTE — TELEPHONE ENCOUNTER
[address] supply; [place], [phone]    Calling to see if we received  Prior auth for dme supplies    Will fax prior auth again, this was faxed several times already

## 2021-05-26 NOTE — TELEPHONE ENCOUNTER
Alicja Vaughan / [address] to see if we rec'ed the prior auth for DME supplies  Would like a call back re this

## 2021-05-27 ENCOUNTER — TELEPHONE (OUTPATIENT)
Dept: INTERNAL MEDICINE CLINIC | Facility: CLINIC | Age: 86
End: 2021-05-27

## 2021-05-27 NOTE — TELEPHONE ENCOUNTER
Called [employer] this morning and I just received a call back from them, they stated that they have not received a form from the patients medical supply pharmacy, I called them back to inform them of this and to have them refax it once again, so I can try to have it pulled from [address] as soon as it comes in   Waiting for form to be faxed

## 2021-05-27 NOTE — TELEPHONE ENCOUNTER
PT  GOT  A  CALL  FROM  [facility]   SAID  THAT  ARIANA  WANTED  HER  TO  GET  BACK  [address]  APPROVED   IT    PT  SAID  SHE  KNOW  NOTHING  ABOUT  THIS    WANTED  TO  KNOW  IF  [address] [place]  [phone]

## 2021-06-16 ENCOUNTER — TELEPHONE (OUTPATIENT)
Dept: GYNECOLOGIC ONCOLOGY | Facility: CLINIC | Age: 86
End: 2021-06-16

## 2021-06-16 ENCOUNTER — OFFICE VISIT (OUTPATIENT)
Dept: SURGICAL ONCOLOGY | Facility: CLINIC | Age: 86
End: 2021-06-16
Payer: MEDICARE

## 2021-06-16 VITALS
DIASTOLIC BLOOD PRESSURE: 78 MMHG | RESPIRATION RATE: 18 BRPM | HEART RATE: 74 BPM | BODY MASS INDEX: 20.83 KG/M2 | OXYGEN SATURATION: 99 % | SYSTOLIC BLOOD PRESSURE: 140 MMHG | WEIGHT: 133 LBS | TEMPERATURE: 98.1 F

## 2021-06-16 DIAGNOSIS — E21.3 HYPERPARATHYROIDISM (HCC): Primary | ICD-10-CM

## 2021-06-16 DIAGNOSIS — E83.52 HYPERCALCEMIA: ICD-10-CM

## 2021-06-16 PROCEDURE — 99214 OFFICE O/P EST MOD 30 MIN: CPT | Performed by: SURGERY

## 2021-06-16 NOTE — TELEPHONE ENCOUNTER
Patient called in a panic and said it is very important that she gets a phone call back asap  She was seen today and certain dates were changed   Please call the patient back [phone]

## 2021-06-16 NOTE — PROGRESS NOTES
[address] [facility] SURGICAL ONCOLOGY [facility] [address]     Abiquiu Litter  1935  [id number]  [facility]  CANCER CARE [facility] SURGICAL ONCOLOGY [address] [address]         Chief Complaint   Patient presents with    Consult    Hyperparathyroidism         Assessment/Plan:     No problem-specific Assessment & Plan notes found for this encounter          Diagnoses and all orders for this visit:     Hyperparathyroidism ([address] )  -     Ambulatory referral to Surgical Oncology     Multiple thyroid nodules     Parathyroid adenoma  -     Ambulatory referral to Surgical Oncology     Thyroid nodule  -     Ambulatory referral to Surgical Oncology         Advance Care Planning/Advance Directives:  Discussed disease status, cancer treatment plans and/or cancer treatment goals with the patient           Oncology History     No history exists          History of Present Illness: The patient is an 81-year-old woman who has developed achiness and fatigue progressively over last couple months  She has a history of a fall and fracture as far back as 6 years ago mandating spine surgery  She had more recent falls leading to fractures in the last she years  She denies a history of kidney stones  She has complaints of   Diffuse bone pain which she has attributed to chronic arthritis  Mood, memory, and concentration seemed to be non-affected      Review of Systems   Constitutional: Positive for fatigue  HENT: Negative  Eyes: Negative  Respiratory: Negative  Cardiovascular: Negative  Gastrointestinal: Negative  Endocrine: Negative  Genitourinary: Negative  Musculoskeletal: Positive for arthralgias, back pain, gait problem and myalgias  Skin: Negative  Allergic/Immunologic: Negative  Hematological: Negative  Psychiatric/Behavioral: Negative  Negative for decreased concentration   The patient is not nervous/anxious                  Patient Active Problem List   Diagnosis    Arthritis    Hypertension, essential    Combined hyperlipidemia    Pre-operative cardiovascular examination    Microscopic hematuria    Cardiac arrhythmia    Anemia    Urinary retention    Bladder diverticulum    Bladder prolapse, female, acquired    Slow transit constipation    Age-related osteoporosis without current pathological fracture    Health care maintenance    Weight loss    Breast screening    Postural kyphosis of thoracolumbar region    Chest pressure    Compression fracture of T12 vertebra with delayed healing    Chronic fatigue    Hypercalcemia    Hyperparathyroidism ([address] )    Multiple thyroid nodules      Medical History        Past Medical History:   Diagnosis Date    Alopecia      Cardiac arrhythmia      Compression fracture of L1 lumbar vertebra (HCC)       resolved 09/2014    Occlusion of carotid artery       unspecified laterality resolved 08/05/2016         Surgical History         Past Surgical History:   Procedure Laterality Date    APPENDECTOMY        BACK SURGERY        BREAST BIOPSY Right       pt doesn't recall the year    COLONOSCOPY         resolved 2009    ESOPHAGOGASTRODUODENOSCOPY         mild gastritis resolved 2009    SHOULDER SURGERY        THROMBOENDARTERECTOMY Left       carotid, resloved 12/2012    TONSILLECTOMY                   Family History   Problem Relation Age of Onset    No Known Problems Mother      No Known Problems Sister      No Known Problems Daughter      No Known Problems Maternal Grandmother      No Known Problems Paternal Grandmother      No Known Problems Daughter        Social History               Socioeconomic History    Marital status: /Civil Union       Spouse name: Not on file    Number of children: Not on file    Years of education: Not on file    Highest education level: Not on file   Occupational History    Not on file   Social Needs    Financial resource strain: Not on file    Food insecurity       Worry: Not on file       Inability: Not on file    Transportation needs       Medical: Not on file       Non-medical: Not on file   Tobacco Use    Smoking status: Never Smoker    Smokeless tobacco: Never Used   Substance and Sexual Activity    Alcohol use: Not Currently       Comment: rarely (history); socially    Drug use: No    Sexual activity: Not Currently       Partners: Male   Lifestyle    Physical activity       Days per week: 0 days       Minutes per session: 0 min    Stress: Not at all   Relationships    Social connections       Talks on phone: Not on file       Gets together: Not on file       Attends Sikh service: Not on file       Active member of club or organization: Not on file       Attends meetings of clubs or organizations: Not on file       Relationship status: Not on file    Intimate partner violence       Fear of current or ex partner: Not on file       Emotionally abused: Not on file       Physically abused: Not on file       Forced sexual activity: Not on file   Other Topics Concern    Not on file   Social History Narrative    Not on file            Current Outpatient Medications:     atenolol (TENORMIN) 25 mg tablet, Take 1 tablet (25 mg total) by mouth daily, Disp: 90 tablet, Rfl: 3    atorvastatin (LIPITOR) 40 mg tablet, Take 1 tablet (40 mg total) by mouth daily, Disp: 90 tablet, Rfl: 2    multivitamin (THERAGRAN) TABS, Take 1 tablet by mouth, Disp: , Rfl:         Allergies   Allergen Reactions    Myrbetriq [Mirabegron] GI Intolerance and Hives    Latex - Food Allergy Hives and Swelling    Penicillins Other (See Comments)       Does not remember--as a child    Tramadol Vomiting       Dizziness as well          Vitals:   /78   Pulse 74   Temp 98 1 °F (36 7 °C) (Temporal)   Resp 18   Wt 60 3 kg (133 lb)   SpO2 99%   BMI 20 83 kg/m²     Physical Exam     Pathology:  none     Labs: Lab Results   Component Value Date      1 (H) 03/03/2021     CALCIUM 11 2 (H) 03/03/2021            Imaging  Xr Spine Lumbar Complete W Bending Minimum 6 Views     Result Date: 3/20/2021  Narrative: LUMBAR SPINE INDICATION:   G89 4: Chronic pain syndrome M54 5: Low back pain G89 29: Other chronic pain M96 1: Postlaminectomy syndrome, not elsewhere classified  COMPARISON:  MRI dated 3/4/2021 VIEWS:  XR SPINE LUMBAR COMPLETE W BENDING MINIMUM 6 VIEWS FINDINGS: There are 5 non rib bearing lumbar vertebral bodies  T12 and L1 vertebral compression deformities are noted, stable  Status post L3-L5 discectomy and fusion without hardware complication  L1-L2 and L2 on L3 retrolisthesis secondary to facet arthropathy  Age-appropriate lumbar degenerative changes are seen  The pedicles appear intact  There are atherosclerotic calcifications  Soft tissues are otherwise unremarkable       Impression: 1  Stable postoperative change after L3-L5 discectomy and fusion without hardware complication  2   Stable T12 and L1 vertebral compression deformities  Workstation performed: [id number]      Mri Lumbar Spine Wo Contrast     Result Date: 3/8/2021  Narrative: MRI LUMBAR SPINE WITHOUT CONTRAST INDICATION: Evaluate T12 compression fracture  COMPARISON:  None  TECHNIQUE:  Sagittal T1, sagittal T2, sagittal inversion recovery, axial T1 and axial T2, coronal T2   IMAGE QUALITY:  Diagnostic FINDINGS: VERTEBRAL BODIES:  There are 5 lumbar type vertebral bodies  Postsurgical changes status post L3-L5 decompressive laminectomies and instrumented fusion  There is a severe T12 wedge compression fracture with 80% height loss  There is mild posterior buckling/retropulsion without significant canal stenosis  There is mild bone marrow edema  There is chronic moderate compression fracture of L1 inferior endplate with Schmorl's node formation  There is approximately 50% height loss anteriorly  Grade 1 anterolisthesis of L4 on L5    Mild retrolisthesis of L1 on L2  Modic type II degenerative edema at L2 inferior endplate  SACRUM:  Normal signal within the sacrum  No evidence of insufficiency or stress fracture  DISTAL CORD AND CONUS:  Normal size and signal within the distal cord and conus  PARASPINAL SOFT TISSUES:  Postsurgical changes in the paraspinal soft tissue levels L3-L5  Indeterminate tiny T2 hyperintense focus within the visualized liver statistically favor to represent hepatic cyst  LOWER THORACIC DISC SPACES:  Mild noncompressive lower thoracic degenerative change  LUMBAR DISC SPACES: L1-L2:  There is mild disc bulge and facet arthropathy  There is mild canal narrowing  Mild left foraminal stenosis  L2-L3:  There is disc bulge and bilateral facet arthropathy resulting in mild lateral recesses and canal narrowing  Right greater than left mild bilateral foraminal narrowing  L3-L4:  Decompressive laminectomies  Minimal disc bulge and small endplate osteophytes  Canal is widely patent  Mild right foraminal narrowing  L4-L5:  Grade 1 anterolisthesis uncovering posterior disc margin  Decompressive laminectomies  Canal is widely patent  Mild bilateral foraminal narrowing  L5-S1:  No significant disc bulge  Right greater than left facet arthropathy  There is no canal stenosis  No significant foraminal narrowing       Impression: 1  Recent severe T12 wedge compression fracture with mild posterior buckling/retropulsion without significant canal stenosis  2   Chronic moderate L1 vertebral body compression deformity  3   Postsurgical changes status post L3-L5 decompressive laminectomies and instrumented fusion  4   Degenerative changes without high-grade canal or foraminal stenosis as detailed  Mild canal narrowing at L2-3   Workstation performed: [id number]      Us Thyroid     Result Date: 3/24/2021  Narrative: THYROID ULTRASOUND INDICATION:    E21 3: Hyperparathyroidism, unspecified D35 1: Benign neoplasm of parathyroid gland E04 1: Nontoxic single thyroid nodule  COMPARISON:  12/9/2019 TECHNIQUE:   Ultrasound of the thyroid was performed with a high frequency linear transducer in transverse and sagittal planes including volumetric imaging sweeps as well as traditional still imaging technique  FINDINGS:  Normal homogeneous smooth echotexture  Right lobe:  5 5 x 1 4 x 1 4 cm  Left lobe:  5 0 x 1 1 x 1 1 cm  Isthmus:  0 2 cm  Nodule #1  Image 8  RIGHT midgland nodule measuring 1 0 x 0 9 x 0 9 cm  Unchanged from prior  COMPOSITION:  2 points, solid or almost completely solid   ECHOGENICITY:  1 point, hyperechoic or isoechoic  SHAPE:  0 points, wider-than-tall  MARGIN: 0 points, smooth  ECHOGENIC FOCI:  3 points, punctate echogenic foci  TI-RADS Classification: TR 4 (4-6 points), FNA if > 1 5 cm  Follow if > 1cm  Nodule #2  Image 14  RIGHT lower pole nodule measuring 1 0 x 0 6 x 0 6 cm  Unchanged from prior  COMPOSITION:  0 points, spongiform  ECHOGENICITY:  Not applicable when spongiform composition  SHAPE:  Not applicable when spongiform composition  MARGIN: Not applicable when spongiform composition  ECHOGENIC FOCI:  Not applicable when spongiform composition  TI-RADS Classification: TR 1 (0 points), Benign  No FNA  There are additional thyroid nodules of lesser size and/or TI-RADS score  These do not necessitate additional evaluation based on ACR criteria  Hypoechoic nodule adjacent posterior to the lower pole of the right thyroid lobe measuring 1 1 x 0 8 x 0 6 cm  In retrospect, this was present on the prior study though not measured, and is unchanged  This corresponds with a parathyroid adenoma seen on  nuclear medicine parathyroid scan of 3/19/2021  Prominent but not pathologically enlarged lymph node measuring 0 3 cm in short axis is noted adjacent to the left thyroid lobe       Impression: Stable thyroid nodules  No nodule meets current ACR criteria for requiring biopsy but followup ultrasound is recommended in 1 year   1 1 cm nodule posterior to the right thyroid lower pole corresponding with parathyroid adenoma seen on recent nuclear medicine parathyroid scan  Workstation performed: [id number]      Nm Parathyroid Scan W Spect     Result Date: 3/22/2021  Narrative: PARATHYROID SCAN INDICATION:  E21 3: Hyperparathyroidism, unspecified COMPARISON:  Thyroid ultrasound 12/9/2019 TECHNIQUE:   Following the intravenous administration of 26 5 mCi Tc-99m Cardiolite, anterior and bilateral anterior oblique projection images of the neck and mediastinum were obtained at approximately 10 minutes post injection followed at 2 hours post injection by static anterior and bilateral oblique projections as well as SPECT images in coronal, sagittal and axial projections  FINDINGS: Immediate planar images demonstrate asymmetric enlargement of the right thyroid as compared to the left  Delayed planar and SPECT images demonstrate persistent intense nodular activity along the inferior border of the right thyroid  This would be suspicious for a parathyroid adenoma  In retrospect this may correspond with a 1 x 0 8 cm hypoechoic nodule seen on prior ultrasound exam       Impression: 1  Persistent nodular activity along the inferior border of the right thyroid, suspicious for a parathyroid adenoma in the appropriate clinical setting  Consider follow-up ultrasound for confirmation  Workstation performed: [id number]      I reviewed the above laboratory and imaging data      Discussion/Summary:  [de-identified] year old woman with primary hyperparathyroidism  Suspected right-sided target based on sestamibi scan and ultrasound  She is a candidate for minimally invasive parathyroidectomy, possible 4 gland exploration with intraoperative PTH monitoring  Rationale for this along with risks and benefits of surgery including infection, bleeding, need for possible additional surgery, discussed with her  She understands the plan wishes to proceed as outlined    All questions answered

## 2021-06-21 ENCOUNTER — TELEPHONE (OUTPATIENT)
Dept: PAIN MEDICINE | Facility: CLINIC | Age: 86
End: 2021-06-21

## 2021-06-21 DIAGNOSIS — S22.080G COMPRESSION FRACTURE OF T12 VERTEBRA WITH DELAYED HEALING: ICD-10-CM

## 2021-06-21 DIAGNOSIS — M40.05 POSTURAL KYPHOSIS OF THORACOLUMBAR REGION: Primary | ICD-10-CM

## 2021-06-21 NOTE — TELEPHONE ENCOUNTER
Patient states she's experiencing lower back pain & is requesting a back brace  She would like to discuss her status with a nurse   Please advise, ritchie    Call back# [phone]

## 2021-06-21 NOTE — TELEPHONE ENCOUNTER
S/w pt  C/o lower back pain  Pt states she is not having anymore injections and states they have not worked for her  Last injection was caudal epidural 4/14  Last ov 3/16 with AS  Requesting a lower back brace at this time  Please advise on brace or ov for reeval at this time?

## 2021-06-22 ENCOUNTER — TELEPHONE (OUTPATIENT)
Dept: HEMATOLOGY ONCOLOGY | Facility: CLINIC | Age: 86
End: 2021-06-22

## 2021-06-22 ENCOUNTER — TELEPHONE (OUTPATIENT)
Dept: INTERNAL MEDICINE CLINIC | Facility: CLINIC | Age: 86
End: 2021-06-22

## 2021-06-22 NOTE — TELEPHONE ENCOUNTER
Explained to pt that Dr Jo Gottron does the parathyroidectomy minimally invasive, not robotically  Pt verbally understands

## 2021-06-22 NOTE — TELEPHONE ENCOUNTER
Pt has pre-op appt with Dr Erica Crystal on July 10  Surgery date: 7/13    A surgical clearance form was Faxed to the office on Fri June 18  This might have been received and printed out and put in the dr's basket    Was it received?    Dr Clover Hurtado is the surgeon

## 2021-06-28 ENCOUNTER — APPOINTMENT (OUTPATIENT)
Dept: LAB | Facility: CLINIC | Age: 86
End: 2021-06-28
Payer: MEDICARE

## 2021-06-28 ENCOUNTER — CONSULT (OUTPATIENT)
Dept: INTERNAL MEDICINE CLINIC | Facility: CLINIC | Age: 86
End: 2021-06-28
Payer: MEDICARE

## 2021-06-28 VITALS
BODY MASS INDEX: 20.25 KG/M2 | SYSTOLIC BLOOD PRESSURE: 120 MMHG | HEART RATE: 81 BPM | DIASTOLIC BLOOD PRESSURE: 78 MMHG | WEIGHT: 126 LBS | HEIGHT: 66 IN | OXYGEN SATURATION: 97 %

## 2021-06-28 DIAGNOSIS — D35.1 PARATHYROID ADENOMA: ICD-10-CM

## 2021-06-28 DIAGNOSIS — D35.1 PARATHYROID ADENOMA: Primary | ICD-10-CM

## 2021-06-28 LAB
ANION GAP SERPL CALCULATED.3IONS-SCNC: 5 MMOL/L (ref 4–13)
BUN SERPL-MCNC: 15 MG/DL (ref 5–25)
CALCIUM SERPL-MCNC: 11.1 MG/DL (ref 8.3–10.1)
CHLORIDE SERPL-SCNC: 105 MMOL/L (ref 100–108)
CO2 SERPL-SCNC: 25 MMOL/L (ref 21–32)
CREAT SERPL-MCNC: 1.09 MG/DL (ref 0.6–1.3)
GFR SERPL CREATININE-BSD FRML MDRD: 47 ML/MIN/1.73SQ M
GLUCOSE SERPL-MCNC: 96 MG/DL (ref 65–140)
POTASSIUM SERPL-SCNC: 4.4 MMOL/L (ref 3.5–5.3)
PTH-INTACT SERPL-MCNC: 126.4 PG/ML (ref 18.4–80.1)
SODIUM SERPL-SCNC: 135 MMOL/L (ref 136–145)

## 2021-06-28 PROCEDURE — 36415 COLL VENOUS BLD VENIPUNCTURE: CPT

## 2021-06-28 PROCEDURE — 80048 BASIC METABOLIC PNL TOTAL CA: CPT

## 2021-06-28 PROCEDURE — 83970 ASSAY OF PARATHORMONE: CPT

## 2021-06-28 PROCEDURE — 99213 OFFICE O/P EST LOW 20 MIN: CPT | Performed by: INTERNAL MEDICINE

## 2021-06-28 NOTE — PROGRESS NOTES
Assessment/Plan:       Diagnoses and all orders for this visit:    Parathyroid adenoma  -     Basic metabolic panel; Future  -     PTH, intact; Future                Subjective:      Patient ID: Jun Stanton is a 81 y o  female  A patient with parathyroid adenoma  Surgical excision has been scheduled but the patient expresses doubts about whether not she should do it  1st identified a good half a year ago with elevated calcium  Parathyroid hormone levels have been high   Calcium level has been low elevation with 1 exception  A couple of months ago was 11 2   2 readings after that have dropped back down on the last was 10 4  She saw Endocrinology for consultation  The endocrinologist recommended she proceed with surgery but did a 24 hour urine for calcium excretion and that was normal   No cardiac arrhythmia history   No renal stone history     She feels well  The following portions of the patient's history were reviewed and updated as appropriate:   She has a past medical history of Alopecia, Cardiac arrhythmia, Compression fracture of L1 lumbar vertebra ([address] ), and Occlusion of carotid artery  ,  does not have any pertinent problems on file  ,   has a past surgical history that includes Appendectomy; Thromboendarterectomy (Left); Colonoscopy; Esophagogastroduodenoscopy; Tonsillectomy; Back surgery; Breast biopsy (Right); and Shoulder surgery  ,  family history includes No Known Problems in her daughter, daughter, maternal grandmother, mother, paternal grandmother, and sister  ,   reports that she has never smoked  She has never used smokeless tobacco  She reports previous alcohol use  She reports that she does not use drugs  ,  is allergic to myrbetriq [mirabegron], latex, penicillins, and tramadol     Current Outpatient Medications   Medication Sig Dispense Refill    atenolol (TENORMIN) 25 mg tablet Take 1 tablet (25 mg total) by mouth daily 90 tablet 3    atorvastatin (LIPITOR) 40 mg tablet Take 1 tablet (40 mg total) by mouth daily 90 tablet 2    multivitamin (THERAGRAN) TABS Take 1 tablet by mouth      lidocaine (LIDODERM) 5 % Apply 1 patch topically daily Remove & Discard patch within 12 hours or as directed by MD (Patient not taking: Reported on 6/28/2021) 30 patch 0    methocarbamol (ROBAXIN) 500 mg tablet Take 1 tablet (500 mg total) by mouth 2 (two) times a day as needed for muscle spasms (Patient not taking: Reported on 6/28/2021) 20 tablet 0     No current facility-administered medications for this visit  Review of Systems   Respiratory: Negative for cough and shortness of breath  Cardiovascular: Negative for chest pain and palpitations  Neurological: Negative for tremors, syncope and weakness  Objective:  Vitals:    06/28/21 1356   BP: 120/78   Pulse: 81   SpO2: 97%      Physical Exam  Constitutional:       Appearance: Normal appearance  Comments:   Older female patient who appears to be the stated   Cardiovascular:      Rate and Rhythm: Normal rate  Pulmonary:      Effort: Pulmonary effort is normal    Neurological:      General: No focal deficit present  Mental Status: She is oriented to person, place, and time  Patient Instructions    A patient with parathyroid adenoma and low level hypercalcemia without any clinical consequences  The patient wishes to cancel surgery  I do not think this to be a bad choice  We will periodically monitor serum calcium levels  Indication to proceed with surgery will be clearly climbing levels

## 2021-06-28 NOTE — PATIENT INSTRUCTIONS
A patient with parathyroid adenoma and low level hypercalcemia without any clinical consequences  The patient wishes to cancel surgery  I do not think this to be a bad choice  We will periodically monitor serum calcium levels  Indication to proceed with surgery will be clearly climbing levels

## 2021-06-29 ENCOUNTER — TELEPHONE (OUTPATIENT)
Dept: INTERNAL MEDICINE CLINIC | Facility: CLINIC | Age: 86
End: 2021-06-29

## 2021-06-30 ENCOUNTER — TELEPHONE (OUTPATIENT)
Dept: HEMATOLOGY ONCOLOGY | Facility: CLINIC | Age: 86
End: 2021-06-30

## 2021-06-30 NOTE — TELEPHONE ENCOUNTER
Would like to post pone the surgery scheduled for 7 13 21  She would like to wait a couple of months to schedule her next one  She doesn't want to r/s at this time  She is not requesting a call back but if we have any further questions, we are welcomed to call her  She will keep checking her calcium levels

## 2021-08-18 ENCOUNTER — TELEPHONE (OUTPATIENT)
Dept: GASTROENTEROLOGY | Facility: CLINIC | Age: 86
End: 2021-08-18

## 2021-08-18 ENCOUNTER — OFFICE VISIT (OUTPATIENT)
Dept: INTERNAL MEDICINE CLINIC | Facility: CLINIC | Age: 86
End: 2021-08-18
Payer: MEDICARE

## 2021-08-18 ENCOUNTER — APPOINTMENT (OUTPATIENT)
Dept: LAB | Facility: CLINIC | Age: 86
End: 2021-08-18
Payer: MEDICARE

## 2021-08-18 VITALS
DIASTOLIC BLOOD PRESSURE: 78 MMHG | SYSTOLIC BLOOD PRESSURE: 124 MMHG | BODY MASS INDEX: 19.86 KG/M2 | HEIGHT: 66 IN | WEIGHT: 123.6 LBS | HEART RATE: 71 BPM | OXYGEN SATURATION: 99 %

## 2021-08-18 DIAGNOSIS — R63.4 WEIGHT LOSS: ICD-10-CM

## 2021-08-18 DIAGNOSIS — E21.3 HYPERPARATHYROIDISM (HCC): Primary | ICD-10-CM

## 2021-08-18 DIAGNOSIS — I10 HYPERTENSION, ESSENTIAL: ICD-10-CM

## 2021-08-18 DIAGNOSIS — R15.9 FULL INCONTINENCE OF FECES: ICD-10-CM

## 2021-08-18 DIAGNOSIS — E21.3 HYPERPARATHYROIDISM (HCC): ICD-10-CM

## 2021-08-18 LAB
ANION GAP SERPL CALCULATED.3IONS-SCNC: 3 MMOL/L (ref 4–13)
BUN SERPL-MCNC: 23 MG/DL (ref 5–25)
CA-I BLD-SCNC: 1.39 MMOL/L (ref 1.12–1.32)
CALCIUM SERPL-MCNC: 10.9 MG/DL (ref 8.3–10.1)
CHLORIDE SERPL-SCNC: 106 MMOL/L (ref 100–108)
CO2 SERPL-SCNC: 28 MMOL/L (ref 21–32)
CORTIS SERPL-MCNC: 14.8 UG/DL
CREAT SERPL-MCNC: 1.08 MG/DL (ref 0.6–1.3)
GFR SERPL CREATININE-BSD FRML MDRD: 47 ML/MIN/1.73SQ M
GLUCOSE SERPL-MCNC: 92 MG/DL (ref 65–140)
POTASSIUM SERPL-SCNC: 4.3 MMOL/L (ref 3.5–5.3)
PTH-INTACT SERPL-MCNC: 130 PG/ML (ref 18.4–80.1)
SODIUM SERPL-SCNC: 137 MMOL/L (ref 136–145)
T3FREE SERPL-MCNC: 2.55 PG/ML (ref 2.3–4.2)
T4 FREE SERPL-MCNC: 0.99 NG/DL (ref 0.76–1.46)
TSH SERPL DL<=0.05 MIU/L-ACNC: 0.71 UIU/ML (ref 0.36–3.74)

## 2021-08-18 PROCEDURE — 83970 ASSAY OF PARATHORMONE: CPT

## 2021-08-18 PROCEDURE — 82330 ASSAY OF CALCIUM: CPT

## 2021-08-18 PROCEDURE — 84443 ASSAY THYROID STIM HORMONE: CPT

## 2021-08-18 PROCEDURE — 82533 TOTAL CORTISOL: CPT

## 2021-08-18 PROCEDURE — 86376 MICROSOMAL ANTIBODY EACH: CPT

## 2021-08-18 PROCEDURE — 86800 THYROGLOBULIN ANTIBODY: CPT

## 2021-08-18 PROCEDURE — 36415 COLL VENOUS BLD VENIPUNCTURE: CPT

## 2021-08-18 PROCEDURE — 99214 OFFICE O/P EST MOD 30 MIN: CPT | Performed by: INTERNAL MEDICINE

## 2021-08-18 PROCEDURE — 84481 FREE ASSAY (FT-3): CPT

## 2021-08-18 PROCEDURE — 84439 ASSAY OF FREE THYROXINE: CPT

## 2021-08-18 PROCEDURE — 80048 BASIC METABOLIC PNL TOTAL CA: CPT

## 2021-08-18 NOTE — PROGRESS NOTES
Assessment/Plan:       Diagnoses and all orders for this visit:    Hyperparathyroidism ([facility] [address] )  -     Calcium, ionized; Future  -     PTH, intact; Future  -     Basic metabolic panel; Future    Hypertension, essential    Full incontinence of feces  -     Ambulatory referral to Gastroenterology; Future    Weight loss  -     Cortisol; Future  -     T3, free; Future  -     T4, free; Future  -     Thyroid Antibodies Panel; Future  -     TSH, 3rd generation; Future                Subjective:      Patient ID: Justino Jimenes is a 81 y o  female  Fecal incontinence developed abruptly about 1 month ago  No diarrhea   No abdominal pain   No rectal bleeding   No rectal prolapse  No fever or chills or sweats     This is superimposed on a long-standing history of urinary incontinence  Prior to this, the patient had a fairly longstanding history of functional constipation  A patient with parathyroid adenoma  Surgical excision has been scheduled but the patient canceled    1st identified a good half a year ago with elevated calcium  Parathyroid hormone levels have been high   Calcium level has been low elevation with 1 exception  A couple of months ago was 11 2   2 readings after that have dropped back down on the last was 10 4  She saw Endocrinology for consultation  The endocrinologist recommended she proceed with surgery but did a 24 hour urine for calcium excretion and that was normal   No cardiac arrhythmia history   No renal stone history         The following portions of the patient's history were reviewed and updated as appropriate:   She has a past medical history of Alopecia, Cardiac arrhythmia, Compression fracture of L1 lumbar vertebra ([address] ), and Occlusion of carotid artery  ,  does not have any pertinent problems on file  ,   has a past surgical history that includes Appendectomy; Thromboendarterectomy (Left); Colonoscopy; Esophagogastroduodenoscopy;  Tonsillectomy; Back surgery; Breast biopsy (Right); and Shoulder surgery  ,  family history includes No Known Problems in her daughter, daughter, maternal grandmother, mother, paternal grandmother, and sister  ,   reports that she has never smoked  She has never used smokeless tobacco  She reports previous alcohol use  She reports that she does not use drugs  ,  is allergic to myrbetriq [mirabegron], latex, penicillins, and tramadol     Current Outpatient Medications   Medication Sig Dispense Refill    atenolol (TENORMIN) 25 mg tablet Take 1 tablet (25 mg total) by mouth daily 90 tablet 3    atorvastatin (LIPITOR) 40 mg tablet Take 1 tablet (40 mg total) by mouth daily 90 tablet 2     No current facility-administered medications for this visit  Review of Systems      Objective:  Vitals:    08/18/21 0913   BP: 124/78   Pulse: 71   SpO2: 99%      Physical Exam      Patient Instructions    New issue of fecal incontinence:  I think it is probably related to pelvic floor dysfunction but will refer to GI  Continue follow-up for hyperparathyroidism with recheck of calcium and PTH  The patient is reluctant to go for surgery and I think the trigger should be persistent elevation at least above 11 with periodic reassessment for hypercalciuria  blood pressure stable      Revisit around [place]

## 2021-08-18 NOTE — TELEPHONE ENCOUNTER
ptn is having bowel issues and is saying Dr Daniel Fabian said Dr Krista Pedersen needs to see her ASAP   Dr Krista Pedersen do you want to look at his note and over book her for keep her for 9/17/21 which is your next available

## 2021-08-19 ENCOUNTER — OFFICE VISIT (OUTPATIENT)
Dept: GASTROENTEROLOGY | Facility: CLINIC | Age: 86
End: 2021-08-19
Payer: MEDICARE

## 2021-08-19 ENCOUNTER — TELEPHONE (OUTPATIENT)
Dept: INTERNAL MEDICINE CLINIC | Facility: CLINIC | Age: 86
End: 2021-08-19

## 2021-08-19 VITALS
SYSTOLIC BLOOD PRESSURE: 142 MMHG | DIASTOLIC BLOOD PRESSURE: 70 MMHG | WEIGHT: 129 LBS | BODY MASS INDEX: 20.73 KG/M2 | HEIGHT: 66 IN | HEART RATE: 77 BPM

## 2021-08-19 DIAGNOSIS — R15.9 FULL INCONTINENCE OF FECES: ICD-10-CM

## 2021-08-19 LAB
THYROGLOB AB SERPL-ACNC: <1 IU/ML (ref 0–0.9)
THYROPEROXIDASE AB SERPL-ACNC: <8 IU/ML (ref 0–34)

## 2021-08-19 PROCEDURE — 99213 OFFICE O/P EST LOW 20 MIN: CPT | Performed by: INTERNAL MEDICINE

## 2021-08-19 NOTE — PROGRESS NOTES
[facility]      Chief Complaint:   Incontinence    HPI:  Alanna Duong is a 81 y o   female who presents with  Several episodes of fecal incontinence  Patient has no rectal or abdominal pain  She has been having some issues with her thyroid and has lost some weight  She has no melena hematochezia or rectal bleeding  No fever or chills  According to the patient she began eating yogurt just before this started  She also stopped taking her usual Metamucil sometime before this started  There has been no other dietary manipulation  Patient has no other GI complaints at the present time  She has significant back issues and difficulty walking and wears a brace         Review of Systems:   Constitutional: No fever or chills, feels well, no tiredness, no recent weight gain or weight loss  HENT: No complaints of earache, no hearing loss, no nosebleeds, no nasal discharge, no sore throat, no hoarseness  Eyes: No complaints of eye pain, no red eyes, no discharge from eyes, no itchy eyes  Cardiovascular: No complaints of slow heart rate, no fast heart rate, no chest pain, no palpitations, no leg claudication, no lower extremity edema  Respiratory: No complaints of shortness of breath, no wheezing, no cough, no SOB on exertion, no orthopnea  Gastrointestinal: As noted in HPI  Genitourinary: No complaints of dysuria, no incontinence, no hesitancy, no nocturia  Musculoskeletal as per HPI  Neurological: No complaints of headache, no confusion, no convulsions, no numbness or tingling, no dizziness or fainting, no limb weakness,   Positive difficulty walking  Skin: No complaints of skin rash or skin lesions, no itching, no skin wound, no dry skin  Hematological/Lymphatic: No complaints of swollen glands, does not bleed easy  Allergic/Immunologic: No immunocompromised state  Endocrine:  No complaints of polyuria, no polydipsia     Psychiatric/Behavioral: is not suicidal, no sleep disturbances, no anxiety or depression, no change in personality, no emotional problems  Historical Information   Past Medical History:   Diagnosis Date    Alopecia     Cardiac arrhythmia     Compression fracture of L1 lumbar vertebra ([address] )     resolved 09/2014    Occlusion of carotid artery     unspecified laterality resolved 08/05/2016     Past Surgical History:   Procedure Laterality Date    APPENDECTOMY      BACK SURGERY      BREAST BIOPSY Right     pt doesn't recall the year    COLONOSCOPY      resolved 2009    ESOPHAGOGASTRODUODENOSCOPY      mild gastritis resolved 2009    SHOULDER SURGERY      THROMBOENDARTERECTOMY Left     carotid, resloved 12/2012    TONSILLECTOMY       Social History   Social History     Substance and Sexual Activity   Alcohol Use Not Currently    Comment: rarely (history); socially     Social History     Substance and Sexual Activity   Drug Use No     Social History     Tobacco Use   Smoking Status Never Smoker   Smokeless Tobacco Never Used     Family History   Problem Relation Age of Onset    No Known Problems Mother     No Known Problems Sister     No Known Problems Daughter     No Known Problems Maternal Grandmother     No Known Problems Paternal Grandmother     No Known Problems Daughter          Current Medications: has a current medication list which includes the following prescription(s): atenolol and atorvastatin  Vital Signs: /70   Pulse 77   Ht 5' 6" (1 676 m)   Wt 58 5 kg (129 lb)   BMI 20 82 kg/m²       Physical Exam:   Constitutional  General Appearance: No acute distress, well appearing and well nourished  Head  Normocephalic  Eyes  Conjunctivae and lids: No swelling, erythema, or discharge  Pupils and irises: Equal, round and reactive to light     Ears, Nose, Mouth, and Throat  External inspection of ears and nose: Normal  Nasal mucosa, septum and turbinates: Normal without edema or erythema/   Oropharynx: Normal with no erythema, edema, exudate or lesions  Neck  Normal range of motion  Neck supple  Cardiovascular  Auscultation of the heart: Normal rate and rhythm, normal S1 and S2 without murmurs  Examination of the extremities for edema and/or varicosities: Normal  Pulmonary/Chest  Respiratory effort: No increased work of breathing or signs of respiratory distress  Auscultation of lungs: Clear to auscultation, equal breath sounds bilaterally, no wheezes, rales, no rhonchi  Abdomen  Abdomen: Non-tender, no masses  Liver and spleen: No hepatomegaly or splenomegaly  Musculoskeletal  Gait and station:  Positive kyphosis  Uses a walker  Digits and Nails: normal without clubbing or cyanosis  Inspection/palpation of joints, bones, and muscles:   Kyphosis  Mild musculoskeletal wasting  Neurological  No nystagmus or asterixis  Skin  Skin and subcutaneous tissue: Normal without rashes or lesions  Lymphatic  Palpation of the lymph nodes in neck: No lymphadenopathy  Psychiatric  Orientation to person, place and time: Normal   Mood and affect: Normal          Labs:  Lab Results   Component Value Date    ALT 22 05/14/2021    AST 19 05/14/2021    BUN 23 08/18/2021    CALCIUM 10 9 (H) 08/18/2021     08/18/2021    CHOL 151 12/05/2015    CO2 28 08/18/2021    CREATININE 1 08 08/18/2021    HDL 73 07/17/2020    HCT 34 7 (L) 05/14/2021    HGB 11 2 (L) 05/14/2021    HGBA1C 5 6 07/17/2020    MG 1 8 07/16/2020     05/14/2021    K 4 3 08/18/2021     (L) 12/05/2015    TRIG 24 07/17/2020    WBC 4 08 (L) 05/14/2021         X-Rays & Procedures:   No orders to display           ______________________________________________________________________      Assessment & Plan:     Diagnoses and all orders for this visit:    Full incontinence of feces  -     Ambulatory referral to Gastroenterology      Patient is given a copy of a lactose-free diet    Since the onset of her incontinence was consequent with taking yogurt this may be the issue  In the meanwhile she will also reinstitute the Metamucil which she had stopped also before this started  She will call me in 10 days with her progress

## 2021-08-19 NOTE — TELEPHONE ENCOUNTER
Elevated calcium 10 9  Elevated parathyroid hormone level    Essentially unchanged from the past year

## 2021-08-19 NOTE — TELEPHONE ENCOUNTER
Patient wants a call back with the results of her calcium levels that was done in her blood work yesterday      # [phone]

## 2021-08-21 NOTE — PATIENT INSTRUCTIONS
New issue of fecal incontinence:  I think it is probably related to pelvic floor dysfunction but will refer to GI  Continue follow-up for hyperparathyroidism with recheck of calcium and PTH  The patient is reluctant to go for surgery and I think the trigger should be persistent elevation at least above 11 with periodic reassessment for hypercalciuria  blood pressure stable      Revisit around Thankskael

## 2021-08-23 ENCOUNTER — TELEPHONE (OUTPATIENT)
Dept: INTERNAL MEDICINE CLINIC | Facility: CLINIC | Age: 86
End: 2021-08-23

## 2021-08-30 ENCOUNTER — TELEPHONE (OUTPATIENT)
Dept: GASTROENTEROLOGY | Facility: CLINIC | Age: 86
End: 2021-08-30

## 2021-08-30 NOTE — TELEPHONE ENCOUNTER
Tell her to continue, it is a slow process, and there are plenty of lactose free products out there, she just has to look since lactose intolerance is a very common problem - give it another 2 weeks and call me with progress

## 2021-08-30 NOTE — TELEPHONE ENCOUNTER
Nothing at all  If this is a persistent problem she needs to be seen 
Patient would like to know if there is anything in her blood work that would be causing her to have severe fatigue  She was here last week to see Dr Elyssa Espinosa but did not ask about the labs 
Spoke to patient
lmtcb
No

## 2021-08-30 NOTE — TELEPHONE ENCOUNTER
Pt called to give her progress  Stated things have been a little better but not fully   She stated it's hard to find lactose free products

## 2021-09-17 DIAGNOSIS — E78.2 COMBINED HYPERLIPIDEMIA: ICD-10-CM

## 2021-09-17 RX ORDER — ATORVASTATIN CALCIUM 40 MG/1
TABLET, FILM COATED ORAL
Qty: 90 TABLET | Refills: 2 | Status: SHIPPED | OUTPATIENT
Start: 2021-09-17

## 2021-10-22 ENCOUNTER — TELEPHONE (OUTPATIENT)
Dept: INTERNAL MEDICINE CLINIC | Facility: CLINIC | Age: 86
End: 2021-10-22

## 2021-10-25 ENCOUNTER — TELEPHONE (OUTPATIENT)
Dept: OBGYN CLINIC | Facility: HOSPITAL | Age: 86
End: 2021-10-25

## 2021-10-25 ENCOUNTER — TELEPHONE (OUTPATIENT)
Dept: PAIN MEDICINE | Facility: CLINIC | Age: 86
End: 2021-10-25

## 2021-12-10 ENCOUNTER — APPOINTMENT (OUTPATIENT)
Dept: LAB | Facility: CLINIC | Age: 86
End: 2021-12-10
Payer: MEDICARE

## 2021-12-10 ENCOUNTER — OFFICE VISIT (OUTPATIENT)
Dept: INTERNAL MEDICINE CLINIC | Facility: CLINIC | Age: 86
End: 2021-12-10
Payer: MEDICARE

## 2021-12-10 VITALS
SYSTOLIC BLOOD PRESSURE: 128 MMHG | OXYGEN SATURATION: 98 % | DIASTOLIC BLOOD PRESSURE: 72 MMHG | TEMPERATURE: 98.2 F | HEIGHT: 66 IN | HEART RATE: 63 BPM | BODY MASS INDEX: 19.44 KG/M2 | WEIGHT: 121 LBS

## 2021-12-10 DIAGNOSIS — E21.3 HYPERPARATHYROIDISM (HCC): ICD-10-CM

## 2021-12-10 DIAGNOSIS — I10 HYPERTENSION, ESSENTIAL: Primary | ICD-10-CM

## 2021-12-10 DIAGNOSIS — R26.2 AMBULATORY DYSFUNCTION: ICD-10-CM

## 2021-12-10 DIAGNOSIS — I10 HYPERTENSION, ESSENTIAL: ICD-10-CM

## 2021-12-10 DIAGNOSIS — M40.05 POSTURAL KYPHOSIS OF THORACOLUMBAR REGION: ICD-10-CM

## 2021-12-10 DIAGNOSIS — R53.82 CHRONIC FATIGUE: ICD-10-CM

## 2021-12-10 LAB
ANION GAP SERPL CALCULATED.3IONS-SCNC: 4 MMOL/L (ref 4–13)
BASOPHILS # BLD AUTO: 0.02 THOUSANDS/ΜL (ref 0–0.1)
BASOPHILS NFR BLD AUTO: 1 % (ref 0–1)
BUN SERPL-MCNC: 19 MG/DL (ref 5–25)
CALCIUM SERPL-MCNC: 11.4 MG/DL (ref 8.3–10.1)
CHLORIDE SERPL-SCNC: 109 MMOL/L (ref 100–108)
CO2 SERPL-SCNC: 26 MMOL/L (ref 21–32)
CREAT SERPL-MCNC: 1.13 MG/DL (ref 0.6–1.3)
EOSINOPHIL # BLD AUTO: 0.03 THOUSAND/ΜL (ref 0–0.61)
EOSINOPHIL NFR BLD AUTO: 1 % (ref 0–6)
ERYTHROCYTE [DISTWIDTH] IN BLOOD BY AUTOMATED COUNT: 13.1 % (ref 11.6–15.1)
GFR SERPL CREATININE-BSD FRML MDRD: 44 ML/MIN/1.73SQ M
GLUCOSE P FAST SERPL-MCNC: 92 MG/DL (ref 65–99)
HCT VFR BLD AUTO: 36.3 % (ref 34.8–46.1)
HGB BLD-MCNC: 11.9 G/DL (ref 11.5–15.4)
IMM GRANULOCYTES # BLD AUTO: 0.01 THOUSAND/UL (ref 0–0.2)
IMM GRANULOCYTES NFR BLD AUTO: 0 % (ref 0–2)
LYMPHOCYTES # BLD AUTO: 1.01 THOUSANDS/ΜL (ref 0.6–4.47)
LYMPHOCYTES NFR BLD AUTO: 25 % (ref 14–44)
MCH RBC QN AUTO: 31.4 PG (ref 26.8–34.3)
MCHC RBC AUTO-ENTMCNC: 32.8 G/DL (ref 31.4–37.4)
MCV RBC AUTO: 96 FL (ref 82–98)
MONOCYTES # BLD AUTO: 0.27 THOUSAND/ΜL (ref 0.17–1.22)
MONOCYTES NFR BLD AUTO: 7 % (ref 4–12)
NEUTROPHILS # BLD AUTO: 2.71 THOUSANDS/ΜL (ref 1.85–7.62)
NEUTS SEG NFR BLD AUTO: 66 % (ref 43–75)
NRBC BLD AUTO-RTO: 0 /100 WBCS
PLATELET # BLD AUTO: 170 THOUSANDS/UL (ref 149–390)
PMV BLD AUTO: 10.9 FL (ref 8.9–12.7)
POTASSIUM SERPL-SCNC: 4.4 MMOL/L (ref 3.5–5.3)
RBC # BLD AUTO: 3.79 MILLION/UL (ref 3.81–5.12)
SODIUM SERPL-SCNC: 139 MMOL/L (ref 136–145)
WBC # BLD AUTO: 4.05 THOUSAND/UL (ref 4.31–10.16)

## 2021-12-10 PROCEDURE — 99214 OFFICE O/P EST MOD 30 MIN: CPT | Performed by: INTERNAL MEDICINE

## 2021-12-10 PROCEDURE — 85025 COMPLETE CBC W/AUTO DIFF WBC: CPT

## 2021-12-10 PROCEDURE — 36415 COLL VENOUS BLD VENIPUNCTURE: CPT

## 2021-12-10 PROCEDURE — 80048 BASIC METABOLIC PNL TOTAL CA: CPT

## 2021-12-13 ENCOUNTER — TELEPHONE (OUTPATIENT)
Dept: INTERNAL MEDICINE CLINIC | Facility: CLINIC | Age: 86
End: 2021-12-13

## 2021-12-14 ENCOUNTER — TELEPHONE (OUTPATIENT)
Dept: INTERNAL MEDICINE CLINIC | Facility: CLINIC | Age: 86
End: 2021-12-14

## 2021-12-17 ENCOUNTER — TELEPHONE (OUTPATIENT)
Dept: INTERNAL MEDICINE CLINIC | Facility: CLINIC | Age: 86
End: 2021-12-17

## 2021-12-18 DIAGNOSIS — I10 HYPERTENSION, UNSPECIFIED TYPE: ICD-10-CM

## 2021-12-18 RX ORDER — ATENOLOL 25 MG/1
TABLET ORAL
Qty: 90 TABLET | Refills: 3 | Status: SHIPPED | OUTPATIENT
Start: 2021-12-18 | End: 2021-12-28 | Stop reason: SDUPTHER

## 2021-12-21 ENCOUNTER — OFFICE VISIT (OUTPATIENT)
Dept: INTERNAL MEDICINE CLINIC | Facility: CLINIC | Age: 86
End: 2021-12-21
Payer: MEDICARE

## 2021-12-21 VITALS
BODY MASS INDEX: 19.54 KG/M2 | TEMPERATURE: 97.7 F | DIASTOLIC BLOOD PRESSURE: 70 MMHG | WEIGHT: 121.6 LBS | HEART RATE: 70 BPM | SYSTOLIC BLOOD PRESSURE: 128 MMHG | HEIGHT: 66 IN | OXYGEN SATURATION: 99 %

## 2021-12-21 DIAGNOSIS — Z23 ENCOUNTER FOR IMMUNIZATION: ICD-10-CM

## 2021-12-21 DIAGNOSIS — E21.3 HYPERPARATHYROIDISM (HCC): Primary | ICD-10-CM

## 2021-12-21 PROCEDURE — 99213 OFFICE O/P EST LOW 20 MIN: CPT | Performed by: INTERNAL MEDICINE

## 2021-12-28 DIAGNOSIS — I10 HYPERTENSION, UNSPECIFIED TYPE: ICD-10-CM

## 2021-12-29 RX ORDER — ATENOLOL 25 MG/1
25 TABLET ORAL DAILY
Qty: 90 TABLET | Refills: 3 | Status: SHIPPED | OUTPATIENT
Start: 2021-12-29

## 2022-01-05 ENCOUNTER — TELEPHONE (OUTPATIENT)
Dept: INTERNAL MEDICINE CLINIC | Facility: CLINIC | Age: 87
End: 2022-01-05

## 2022-01-05 NOTE — TELEPHONE ENCOUNTER
PT was told by her children that due to her and her  Miller Martinez) age that they should have some forms to be signed? Power of Attny? Also, inquiring if her  David Ashley is taking any medication for his decline in his mental status  "he takes so many medications that she doesn't know"      Please advise: [phone]

## 2022-01-06 NOTE — TELEPHONE ENCOUNTER
Ronnie Thomas called to say that Malachi Hernandez has been admitted to the hospital  She is asking for a call back       [phone]

## 2022-01-07 NOTE — TELEPHONE ENCOUNTER
S/w Peterson Cortez she states Mary Fernando covid positive in [facility] in [facility]  Pt is not doing well  She just wanted to update you and thank you for all your help  Pt advised to call if we could be of further assistance

## 2022-01-15 ENCOUNTER — TELEPHONE (OUTPATIENT)
Dept: INTERNAL MEDICINE CLINIC | Facility: CLINIC | Age: 87
End: 2022-01-15

## 2022-01-15 NOTE — TELEPHONE ENCOUNTER
[facility] CALLED TO LET DR Jeannette De La O PASSED AWAY 1/14/22 - WANTS TO THANK DR LANE FOR ALL HE HAS DONE FOR THEM

## 2022-06-15 ENCOUNTER — TELEPHONE (OUTPATIENT)
Dept: INTERNAL MEDICINE CLINIC | Facility: CLINIC | Age: 87
End: 2022-06-15

## 2022-07-13 ENCOUNTER — TELEPHONE (OUTPATIENT)
Dept: GASTROENTEROLOGY | Facility: CLINIC | Age: 87
End: 2022-07-13

## 2022-07-13 NOTE — TELEPHONE ENCOUNTER
Vince Harrington / Oleg Herr patient - patient LMOM that she would like a RTRN call ([phone])    Thx

## 2022-07-13 NOTE — TELEPHONE ENCOUNTER
Patient doing much better with lactrase - she will call office tomorrow with her new address in [address] [place] [address] and we will mail her a copy of a lactose free diet - encouraged her to find a local GI

## 2022-08-10 NOTE — TELEPHONE ENCOUNTER
Patient called about the lactose free diet that you mailed her  She is asking if you could E-mail to her son Shayla Peralta, at [email or web address]  She is also waiting on the new GI office to call her with an appointment  She was offered an appointment this morning but couldn't take because she doesn't drive and had no transportation  Thank you!

## 2022-12-14 ENCOUNTER — TELEPHONE (OUTPATIENT)
Dept: INTERNAL MEDICINE CLINIC | Facility: CLINIC | Age: 87
End: 2022-12-14

## 2022-12-14 NOTE — TELEPHONE ENCOUNTER
[facility] to [facility] phone [phone]

## 2023-01-16 DIAGNOSIS — I10 HYPERTENSION, UNSPECIFIED TYPE: ICD-10-CM

## 2023-01-16 RX ORDER — ATENOLOL 25 MG/1
25 TABLET ORAL DAILY
Qty: 90 TABLET | Refills: 3 | Status: SHIPPED | OUTPATIENT
Start: 2023-01-16

## 2023-08-22 ENCOUNTER — TELEPHONE (OUTPATIENT)
Age: 88
End: 2023-08-22

## 2023-10-31 ENCOUNTER — TELEPHONE (OUTPATIENT)
Age: 88
End: 2023-10-31

## 2023-10-31 NOTE — TELEPHONE ENCOUNTER
Patients GI provider:  Dr. Hans Aparicio    Number to return call: [phone]     Reason for call: Pt called and asked if we can please mail her lab Thyroid Antibodies Panel report from 08/18/2021 thank you     Scheduled procedure/appointment date if applicable: n/a

## 2023-10-31 NOTE — TELEPHONE ENCOUNTER
Pt called in stating that she prefer to have the lab panel emailed to her sons email address instead as they will receive it quicker.  Please email to [email or web address].

## 2024-10-07 NOTE — PATIENT INSTRUCTIONS
Hypertension is treated   Hyperlipidemia is treated; no problems related to these diagnoses  Tolerating medication   Chronic pain  She is taking Tylenol  Use of other medications is very problematic due to age, infirmity, and CKD  Choices are quite limited     History of a large colon polyp and continues to follow with GI     Will recheck laboratory parameters to ensure no metabolic derangement otherwise    Follow-up in August  3

## 2024-11-19 NOTE — TELEPHONE ENCOUNTER
Chart reviewed    Yes she can be placed next available appointment with either Dr Tori Barrett or my schedule   Thanks Vancomycin Dosing by Pharmacy- Cessation of Therapy    Consult to pharmacy for vancomycin dosing has been discontinued by the prescriber, pharmacy will sign off at this time.    Please call pharmacy if there are further questions or re-enter a consult if vancomycin is resumed.     Ozzie Shelton, [facility]